# Patient Record
Sex: FEMALE | Race: WHITE
[De-identification: names, ages, dates, MRNs, and addresses within clinical notes are randomized per-mention and may not be internally consistent; named-entity substitution may affect disease eponyms.]

---

## 2018-10-29 LAB
BASOPHILS # BLD AUTO: 0.1 10^3/UL (ref 0–0.1)
BASOPHILS NFR BLD AUTO: 0 % (ref 0–10)
BUN/CREAT SERPL: 9
CALCIUM SERPL-MCNC: 8.5 MG/DL (ref 8.5–10.1)
CHLORIDE SERPL-SCNC: 97 MMOL/L (ref 98–107)
CO2 SERPL-SCNC: 25 MMOL/L (ref 21–32)
CREAT SERPL-MCNC: 1.06 MG/DL (ref 0.6–1.3)
EOSINOPHIL # BLD AUTO: 0 10^3/UL (ref 0–0.3)
EOSINOPHIL NFR BLD AUTO: 0 % (ref 0–10)
ERYTHROCYTE [DISTWIDTH] IN BLOOD BY AUTOMATED COUNT: 15.1 % (ref 10–14.5)
GFR SERPLBLD BASED ON 1.73 SQ M-ARVRAT: 51 ML/MIN
GLUCOSE SERPL-MCNC: 164 MG/DL (ref 70–105)
HCT VFR BLD CALC: 28 % (ref 35–52)
HGB BLD-MCNC: 9.3 G/DL (ref 11.5–16)
LYMPHOCYTES # BLD AUTO: 2.1 X 10^3 (ref 1–4)
LYMPHOCYTES NFR BLD AUTO: 13 % (ref 12–44)
MANUAL DIFFERENTIAL PERFORMED BLD QL: NO
MCH RBC QN AUTO: 28 PG (ref 25–34)
MCHC RBC AUTO-ENTMCNC: 33 G/DL (ref 32–36)
MCV RBC AUTO: 84 FL (ref 80–99)
MONOCYTES # BLD AUTO: 1.7 X 10^3 (ref 0–1)
MONOCYTES NFR BLD AUTO: 11 % (ref 0–12)
NEUTROPHILS # BLD AUTO: 12.5 X 10^3 (ref 1.8–7.8)
NEUTROPHILS NFR BLD AUTO: 76 % (ref 42–75)
PLATELET # BLD: 450 10^3/UL (ref 130–400)
PMV BLD AUTO: 10 FL (ref 7.4–10.4)
POTASSIUM SERPL-SCNC: 2.9 MMOL/L (ref 3.6–5)
RBC # BLD AUTO: 3.36 10^6/UL (ref 4.35–5.85)
SODIUM SERPL-SCNC: 132 MMOL/L (ref 135–145)
WBC # BLD AUTO: 16.5 10^3/UL (ref 4.3–11)

## 2018-11-04 ENCOUNTER — HOSPITAL ENCOUNTER (EMERGENCY)
Dept: HOSPITAL 75 - ER | Age: 73
Discharge: HOME | End: 2018-11-04
Payer: MEDICARE

## 2018-11-04 VITALS — SYSTOLIC BLOOD PRESSURE: 119 MMHG | DIASTOLIC BLOOD PRESSURE: 62 MMHG

## 2018-11-04 VITALS — WEIGHT: 154 LBS | BODY MASS INDEX: 25.66 KG/M2 | HEIGHT: 65 IN

## 2018-11-04 VITALS — DIASTOLIC BLOOD PRESSURE: 63 MMHG | SYSTOLIC BLOOD PRESSURE: 117 MMHG

## 2018-11-04 DIAGNOSIS — T78.3XXA: Primary | ICD-10-CM

## 2018-11-04 DIAGNOSIS — Z87.891: ICD-10-CM

## 2018-11-04 DIAGNOSIS — C34.90: ICD-10-CM

## 2018-11-04 DIAGNOSIS — I10: ICD-10-CM

## 2018-11-04 LAB
ALBUMIN SERPL-MCNC: 3.7 GM/DL (ref 3.2–4.5)
ALP SERPL-CCNC: 533 U/L (ref 40–136)
ALT SERPL-CCNC: 17 U/L (ref 0–55)
BASOPHILS # BLD AUTO: 0 10^3/UL (ref 0–0.1)
BASOPHILS NFR BLD AUTO: 0 % (ref 0–10)
BILIRUB SERPL-MCNC: 0.4 MG/DL (ref 0.1–1)
BUN/CREAT SERPL: 14
CALCIUM SERPL-MCNC: 9.4 MG/DL (ref 8.5–10.1)
CHLORIDE SERPL-SCNC: 98 MMOL/L (ref 98–107)
CO2 SERPL-SCNC: 21 MMOL/L (ref 21–32)
CREAT SERPL-MCNC: 1.06 MG/DL (ref 0.6–1.3)
EOSINOPHIL # BLD AUTO: 0 10^3/UL (ref 0–0.3)
EOSINOPHIL NFR BLD AUTO: 0 % (ref 0–10)
ERYTHROCYTE [DISTWIDTH] IN BLOOD BY AUTOMATED COUNT: 14.9 % (ref 10–14.5)
GFR SERPLBLD BASED ON 1.73 SQ M-ARVRAT: 51 ML/MIN
GLUCOSE SERPL-MCNC: 113 MG/DL (ref 70–105)
HCT VFR BLD CALC: 33 % (ref 35–52)
HGB BLD-MCNC: 10.7 G/DL (ref 11.5–16)
LYMPHOCYTES # BLD AUTO: 1.4 X 10^3 (ref 1–4)
LYMPHOCYTES NFR BLD AUTO: 17 % (ref 12–44)
MANUAL DIFFERENTIAL PERFORMED BLD QL: NO
MCH RBC QN AUTO: 27 PG (ref 25–34)
MCHC RBC AUTO-ENTMCNC: 33 G/DL (ref 32–36)
MCV RBC AUTO: 83 FL (ref 80–99)
MONOCYTES # BLD AUTO: 0 X 10^3 (ref 0–1)
MONOCYTES NFR BLD AUTO: 1 % (ref 0–12)
NEUTROPHILS # BLD AUTO: 6.6 X 10^3 (ref 1.8–7.8)
NEUTROPHILS NFR BLD AUTO: 82 % (ref 42–75)
PLATELET # BLD: 342 10^3/UL (ref 130–400)
PMV BLD AUTO: 9.5 FL (ref 7.4–10.4)
POTASSIUM SERPL-SCNC: 4.7 MMOL/L (ref 3.6–5)
PROT SERPL-MCNC: 7.4 GM/DL (ref 6.4–8.2)
RBC # BLD AUTO: 3.91 10^6/UL (ref 4.35–5.85)
SODIUM SERPL-SCNC: 130 MMOL/L (ref 135–145)
WBC # BLD AUTO: 8.1 10^3/UL (ref 4.3–11)

## 2018-11-04 PROCEDURE — 85025 COMPLETE CBC W/AUTO DIFF WBC: CPT

## 2018-11-04 PROCEDURE — 36415 COLL VENOUS BLD VENIPUNCTURE: CPT

## 2018-11-04 PROCEDURE — 96374 THER/PROPH/DIAG INJ IV PUSH: CPT

## 2018-11-04 PROCEDURE — 80053 COMPREHEN METABOLIC PANEL: CPT

## 2018-11-04 PROCEDURE — 96375 TX/PRO/DX INJ NEW DRUG ADDON: CPT

## 2018-11-04 NOTE — XMS REPORT
MU2 Ambulatory Summary

 Created on: 2018



Reina Griggs

External Reference #: [id number]

: 1945

Sex: Female



Demographics







 Address  [address]

[place]  [address]

 

 Home Phone  [phone]

 

 Preferred Language  English

 

 Marital Status  

 

 Jainism Affiliation  Unknown

 

 Race  White

 

 Ethnic Group  Not  or 





Author







 Author  Saqib Bustillos

 

 [facility]

 

 Address  [address]

[place], [place]  [id number]



 

 Phone  [phone]







Care Team Providers







 Care Team Member Name  Role  Phone

 

 Saqib Bustillos  PCP  [phone]

 

 Navin Raza  PreferredProvider  [phone]







Allergies and Adverse Reactions







 Name  Reaction  Notes

 

 PENICILLINS      







Plan of Treatment







 Planned Activity  Comments  Planned Date  Planned Time  Plan/Goal

 

 CBC With Auto Differential     2012  12:00 AM   

 

 Chest X-ray, PA and lateral     2012  12:00 AM   

 

 Diagnostic Mammogram     2012  12:00 AM   

 

 Mammography; bilateral     2018  12:00 AM   

 

 Screening mammography, bilateral     2015  12:00 AM   







Medications







 Active 

 

 Name  Start Date  Estimated Completion Date  SIG  Comments

 

 nystatin-triamcinolone 100,000-0.1 unit/g-% topical cream  2013     apply 
to the affected area(s) by topical route 2 times per day in the morning and 
evening   

 

 imipramine HCl 50 mg oral tablet  2014     TAKE 2 TABLETS BY MOUTH EVERY 
MORNING AND 3 TABLETS BY MOUTH EVERY NIGHT AT BEDTIME   

 

 Vesicare 5 mg oral tablet  2014     TAKE 1 TABLET BY MOUTH DAILY   

 

 tramadol 50 mg oral tablet  2014     TAKE 1 TABLET BY MOUTH EVERY 6 HOURS 
AS NEEDED   

 

 imipramine HCl 50 mg oral tablet  2014     TAKE 2 TABLETS BY MOUTH EVERY 
MORNING AND 3 TABLETS BY MOUTH EVERY NIGHT AT BEDTIME   

 

 imipramine HCl 50 mg oral tablet  10/20/2014     TAKE 2 TABLETS BY MOUTH EVERY 
MORNING AND 3 TABLETS BY MOUTH EVERY NIGHT AT BEDTIME   

 

 tramadol 50 mg oral tablet  10/23/2014     TAKE 1 TABLET BY MOUTH EVERY 6 
HOURS AS NEEDED   

 

 Vesicare 5 mg oral tablet  10/23/2014     TAKE 1 TABLET BY MOUTH DAILY   

 

 tramadol 50 mg oral tablet  10/23/2014     TAKE 1 TABLET BY MOUTH EVERY 6 
HOURS AS NEEDED   

 

 Vesicare 5 mg oral tablet  10/23/2014     TAKE 1 TABLET BY MOUTH DAILY   

 

 metoclopramide HCl 10 mg oral tablet  2014     TAKE 1 TABLET BY MOUTH 
FOUR TIMES DAILY 30 MINUTES BEFORE MEALS AND AT BEDTIME   

 

 Abdominal Belt 1 abdominal binder  2015     Use PRN for incisional support
   

 

 imipramine HCl 50 mg oral tablet  2015     TAKE 2 TABLETS BY MOUTH EVERY 
MORNING AND 3 TABLETS BY MOUTH EVERY NIGHT AT BEDTIME   

 

 Vesicare 5 mg oral tablet  3/10/2015     TAKE 1 TABLET BY MOUTH DAILY   

 

 tramadol 50 mg oral tablet  2015     TAKE 1 TABLET BY MOUTH EVERY 6 HOURS 
AS NEEDED   

 

 tramadol 50 mg oral tablet  2015     TAKE 1 TABLET BY MOUTH EVERY 6 HOURS 
AS NEEDED   

 

 imipramine HCl 50 mg oral tablet  2015     TAKE 2 TABLETS BY MOUTH EVERY 
MORNING AND 3 TABLETS BY MOUTH EVERY NIGHT AT BEDTIME   

 

 metoclopramide HCl 10 mg oral tablet  2015     TAKE 1 TABLET BY MOUTH 
FOUR TIMES DAILY 30 MINUTES BEFORE MEALS AND AT BEDTIME   

 

 levothyroxine 50 mcg oral tablet  2015     TAKE 1 TABLET BY MOUTH ONCE 
DAILY   

 

 imipramine HCl 50 mg oral tablet  2015     TAKE 2 TABLETS BY MOUTH EVERY 
MORNING AND 3 TABLETS BY MOUTH EVERY NIGHT AT BEDTIME   

 

 Vesicare 5 mg oral tablet  2016     TAKE 1 TABLET BY MOUTH DAILY   

 

 metoclopramide HCl 10 mg oral tablet  2016     TAKE 1 TABLET BY MOUTH 
FOUR TIMES DAILY 30 MINUTES BEFORE MEALS AND AT BEDTIME   

 

 imipramine HCl 50 mg oral tablet  2016     TAKE 2 TABLETS BY MOUTH EVERY 
MORNING AND 3 TABLETS BY MOUTH EVERY NIGHT AT BEDTIME   

 

 imipramine HCl 50 mg oral tablet  2016     TAKE 2 TABLETS BY MOUTH EVERY 
MORNING AND 3 TABLETS BY MOUTH EVERY NIGHT AT BEDTIME   

 

 ProAir HFA 90 mcg/actuation inhalation HFA aerosol inhaler  2016     
inhale 1 - 2 puffs (90 - 180 mcg) by inhalation route every 6 hours as needed   

 

 ProAir HFA 90 mcg/actuation inhalation HFA aerosol inhaler  10/31/2016     
INHALE 1 TO 2 PUFFS BY MOUTH EVERY 6 HOURS AS NEEDED   

 

 Nebulizer and accessories  10/31/2016     Use as directed DX: COPD RAFAEL: 99   

 

 albuterol sulfate 2.5 mg /3 mL (0.083 %) inhalation solution for nebulization  
10/31/2016     inhale 3 milliliters (2.5 mg) by nebulization route every 8 
hours as needed   

 

 ProAir HFA 90 mcg/actuation inhalation HFA aerosol inhaler  10/31/2016     
INHALE 1 - 2 PUFFS (90 - 180 MCG) BY INHALATION ROUTE EVERY 6 HOURS AS NEEDED   

 

 Vesicare 5 mg oral tablet  2017     TAKE 1 TABLET BY MOUTH DAILY   

 

 imipramine HCl 50 mg oral tablet  2017     TAKE 2 TABLETS BY MOUTH EVERY 
MORNING AND 3 TABLETS BY MOUTH EVERY NIGHT AT BEDTIME   

 

 imipramine HCl 50 mg oral tablet  2017     TAKE 2 TABLETS BY MOUTH EVERY 
MORNING AND 3 EVERY NIGHT AT BEDTIME   

 

 tramadol 50 mg oral tablet  10/2/2017     take 1 tablet (50 mg) by oral route 
every 6 hours as needed   

 

 metoclopramide HCl 10 mg oral tablet  2017  TAKE 1 TABLET BY 
MOUTH FOUR TIMES DAILY 30 MINUTES BEFORE MEALS AND AT BEDTIME FOR 30 DAYS   

 

 levothyroxine 50 mcg oral tablet  2017  TAKE 1 TABLET BY 
MOUTH EVERY DAY. NEED APPOINTMENT FOR FURTHER REFILLS   









  

 

 Name  Start Date  Expiration Date  SIG  Comments

 

 tramadol 50 mg oral tablet  2013  1/15/2014  take 1 tablet (50 mg) by 
oral route every 6 hours as needed for 30 days   

 

 Reglan 10 mg oral tablet  2014  take 1 tablet (10 mg) by oral 
route 4 times per day 30 minutes before meals and at bedtime   

 

 omeprazole 40 mg oral capsule,delayed release(DR/EC)  10/13/2014  10/8/2015  
take 1 capsule (40 mg) by oral route once daily before a meal for 30 days   

 

 Synthroid 50 mcg oral tablet  10/23/2014  10/18/2015  take 1 tablet (50 mcg) 
by oral route once daily for 90 days   

 

 Zithromax Z-Paul 250 mg oral tablet  2014  Take 2 tablets the 
first day (500 mg) followed by 1 tablet (250 mg) days 2-5. for 5 days   

 

 Medrol (Paul) 4 mg oral tablets,dose pack  2014     take as directed   

 

 azithromycin 250 mg oral tablet  3/24/2015  3/29/2015  take 2 tablets (500 mg) 
by oral route once daily for 1 day then 1 tablet (250 mg) by oral route once 
daily for 4 days   

 

 Bactrim -160 mg oral tablet  2016  10/6/2016  take 1 tablet by oral 
route 2 times per day for 7 days   









 Discontinued 

 

 Name  Start Date  Discontinued Date  SIG  Comments

 

 nystatin-triamcinolone 100,000-0.1 unit/g-% topical cream  2014
  APPLY TO THE AFFECTED AREA TWICE DAILY , IN MORNING AND EVENING   

 

 Synthroid 50 mcg oral tablet  10/23/2014  2016  TAKE 1 TABLET (50 MCG) BY 
ORAL ROUTE ONCE DAILY FOR 90 DAYS   

 

 Reglan 10 mg oral tablet  2014  TAKE 1 TABLET (10 MG) BY 
ORAL ROUTE 4 TIMES PER DAY 30 MINUTES BEFORE MEALS AND AT BEDTIME   

 

 oxycodone 15 mg oral tablet  2014  take 1 tablet (15 mg) by 
oral route every 6 hours   

 

 Medrol (Paul) 4 mg oral tablets,dose pack  3/24/2015  2016  take as 
directed   







Problem List







 Description  Status  Onset

 

 Bipolar Disorder  Active   

 

 Chronic Obstructive Pulmonary Disease  Active   

 

 Gastroesophageal Reflux  Active   

 

 Hypothyroidism, Acquired  Active  10/31/2013

 

 Abdominal Pain, RUQ  Active   







Vital Signs







 Date  Time  BP-Sys(mm[Hg]  BP-Sabrina(mm[Hg])  HR(bpm)  RR(rpm)  Temp  WT  HT  HC  
BMI  BSA  BMI Percentile  O2 Sat(%)

 

 10/26/2016  5:04:00 PM  132 mmHg  74 mmHg  84 bpm  18 rpm  97.8 F  176.125 lbs
                 95 %

 

 2016  7:10:00 PM  124 mmHg  68 mmHg  94 bpm  20 rpm  99 F  169 lbs  66.5 
in     26.8684 kg/m  1.8965 m     95 %

 

 2016  10:18:00 AM  148 mmHg  80 mmHg  82 bpm  18 rpm  97.8 F  169 lbs  
66.5 in     26.87 kg/m2  1.90 m2     97 %

 

 3/24/2015  6:08:00 PM  120 mmHg  78 mmHg  89 bpm  20 rpm  98.3 F  160 lbs     
            96 %

 

 2015  1:55:00 PM  154 mmHg  80 mmHg  88 bpm  20 rpm  97.3 F     66.5 in  
             

 

 2015  1:36:00 PM  166 mmHg  82 mmHg  94 bpm  24 rpm  96.2 F  162 lbs  66.5 
in     25.7555 kg/m  1.8568 m     98 %

 

 2015  3:17:00 PM  140 mmHg  76 mmHg  95 bpm  20 rpm  96 F                 
   99 %

 

 2015  1:48:00 PM  160 mmHg  78 mmHg  87 bpm  24 rpm  96.5 F               
     94 %

 

 2014  1:41:00 PM  156 mmHg  84 mmHg  94 bpm  20 rpm  96.3 F  166 lbs  
66.5 in     26.39 kg/m2  1.88 m2     97 %

 

 2014  9:43:00 AM  142 mmHg  64 mmHg  100 bpm  18 rpm  97.2 F  166 lbs  
66.5 in     26.3914 kg/m  1.8796 m      

 

 2014  5:05:00 PM  150 mmHg  62 mmHg  99 bpm  20 rpm  97.6 F  170 lbs  
66.5 in     27.03 kg/m2  1.90 m2     97 %

 

 2014  5:54:00 PM  145 mmHg  90 mmHg  93 bpm  18 rpm  97.5 F  170 lbs  
66.5 in     27.0274 kg/m  1.9021 m     96 %

 

 10/6/2014  3:21:00 PM  140 mmHg  82 mmHg  82 bpm  18 rpm  97.6 F  170 lbs  
66.5 in     27.03 kg/m2  1.90 m2      

 

 10/31/2013  10:02:00 AM  118 mmHg  64 mmHg  78 bpm  18 rpm  98 F  161 lbs  
66.5 in     25.5965 kg/m  1.8511 m      

 

 2013  1:28:00 PM  150 mmHg  80 mmHg  84 bpm  16 rpm  98.1 F  161 lbs  
66.5 in     25.60 kg/m2  1.85 m2     97 %

 

 2012  8:27:00 AM  136 mmHg  68 mmHg  72 bpm  18 rpm  97.6 F  157 lbs  
66.5 in     24.9606 kg/m  1.8279 m      







Social History







 Name  Description  Comments

 

 Tobacco  Current every day smoker  10/26/2016 -  







History of Procedures







 Date Ordered  Description  Order Status

 

 2016 12:00 AM  COMPLETE CBC W/AUTO DIFF WBC  Reviewed

 

 2016 12:00 AM  COMPREHEN METABOLIC PANEL  Reviewed

 

 2016 12:00 AM  LIPID PANEL  Reviewed

 

 2016 12:00 AM  GLYCOSYLATED HEMOGLOBIN TEST  Reviewed

 

 2016 12:00 AM  ASSAY THYROID STIM HORMONE  Reviewed

 

 2016 12:00 AM  AIRWAY INHALATION TREATMENT  Reviewed

 

 10/26/2016 12:00 AM  COMPLETE CBC W/AUTO DIFF WBC  Reviewed

 

 10/26/2016 12:00 AM  COMPREHEN METABOLIC PANEL  Reviewed

 

 10/26/2016 12:00 AM  CHEST X-RAY 2VW FRONTAL&LATL  Reviewed

 

 2012 12:00 AM  COMPUTER DX MAMMOGRAM ADD-ON  Reviewed

 

 2012 12:00 AM  COMPREHEN METABOLIC PANEL  Reviewed

 

 2012 12:00 AM  LIPID PANEL  Reviewed

 

 2012 12:00 AM  CHEST X-RAY 2VW FRONTAL&LATL  Reviewed

 

 2012 12:00 AM  CHEST X-RAY 2VW FRONTAL&LATL  Reviewed

 

 2013 12:00 AM  MAMMOGRAM SCREENING  Reviewed

 

 2013 12:00 AM  COMPLETE CBC W/AUTO DIFF WBC  Reviewed

 

 2013 12:00 AM  COMPREHEN METABOLIC PANEL  Reviewed

 

 2013 12:00 AM  ASSAY THYROID STIM HORMONE  Reviewed

 

 10/6/2014 12:00 AM  COMPLETE CBC W/AUTO DIFF WBC  Reviewed

 

 10/6/2014 12:00 AM  COMPREHEN METABOLIC PANEL  Reviewed

 

 10/6/2014 12:00 AM  GLYCOSYLATED HEMOGLOBIN TEST  Reviewed

 

 10/6/2014 12:00 AM  ASSAY THYROID STIM HORMONE  Reviewed

 

 10/6/2014 12:00 AM  INFLUENZA VIRUS VACCINE SPLIT VIRUS 3/> YRS IM  Reviewed

 

 2014 12:00 AM  CT ABDOMEN W/O & W/DYE  Reviewed

 

 2014 12:00 AM  CT PELVIS W/O & W/DYE  Reviewed

 

 2014 12:00 AM  CT ABD & PELV 1/> REGNS  Reviewed

 

 2014 12:00 AM  ECHO EXAM OF ABDOMEN  Reviewed

 

 2014 12:00 AM  COMPLETE CBC AUTOMATED  Reviewed

 

 2014 12:00 AM  COMPREHEN METABOLIC PANEL  Reviewed

 

 2014 12:00 AM  ASSAY OF AMYLASE  Reviewed

 

 2014 12:00 AM  ASSAY OF LIPASE  Reviewed

 

 2014 12:00 AM  COMPLETE CBC W/AUTO DIFF WBC  Reviewed







Results Summary







 Date and Description  Results

 

 2012 11:18 AM  GLUCOSE 97.0 mg/dLSODIUM 136.0 mmol/LPOTASSIUM 4.20 mmol/
LCHLORIDE 102.0 mmol/LCO2 22.0 mmol/LBUN 7.0 mg/dLCREATININE 1.0 mg/dLSGOT/AST 
12.0 IU/LSGPT/ALT 6.0 IU/LALK PHOS 128.0 IU/LTOTAL PROTEIN 7.40 g/dLALBUMIN 
4.20 g/dLTOTAL BILI 0.40 mg/dLCALCIUM 9.30 mg/dLAGE 66 GFR NonAA 55 GFR AA 67 
eGFR 55 eGFR AA* 60 TRIGLYCERIDES 299.0 mg/dLCHOLESTEROL 293.0 mg/dLHDL 33.0 mg/
dLTOT CHOL/HDL 8.9 LDL (CALC) 200.0 mg/dL

 

 10/24/2013 12:32 PM  WBC 11.1 RBC 5.19 HGB 14.80 g/dLHCT 44.0 %MCV 85.0 fLMCH 
28.50 pgMCHC 33.60 g/dLRDW SD 46 RDW CV 14.90 %MPV 10.50 fLPLT 357 NRBC# 0.00 
NRBC% 0.0 %NEUT 63.70 %%LYMP 26.0 %%MONO 8.50 %%EOS 1.50 %%BASO 0.30 %#NEUT 
7.07 #LYMP 2.88 #MONO 0.94 #EOS 0.17 #BASO 0.03 MANUAL DIFF NOT IND TSH 1.140 
uIU/mLGLUCOSE 109.0 mg/dLSODIUM 136.0 mmol/LPOTASSIUM 4.70 mmol/LCHLORIDE 104.0 
mmol/LCO2 22.0 mmol/LBUN 20.0 mg/dLCREATININE 0.90 mg/dLSGOT/AST 14.0 IU/LSGPT/
ALT 13.0 IU/LALK PHOS 90.0 IU/LTOTAL PROTEIN 7.20 g/dLALBUMIN 4.20 g/dLTOTAL 
BILI 0.40 mg/dLCALCIUM 9.70 mg/dLAGE 68 GFR NonAA 62 GFR AA 75 eGFR >60 mL/min/
1.73 m2eGFR AA* >60 

 

 10/6/2014 4:10 PM  HGB A1C 7.20 %Est Avg Glucose 159.9 mg/dLTSH 1.860 uIU/
mLWBC 11.5 RBC 5.21 HGB 14.60 g/dLHCT 43.0 %MCV 83.0 fLMCH 28.0 pgMCHC 34.0 g/
dLRDW SD 46 RDW CV 15.20 %MPV 10.20 fLPLT 311 NRBC# 0.00 NRBC% 0.0 %NEUT 65.30 %
%LYMP 25.20 %%MONO 8.0 %%EOS 1.20 %%BASO 0.30 %#NEUT 7.51 #LYMP 2.89 #MONO 0.92 
#EOS 0.14 #BASO 0.03 MANUAL DIFF SEE BELOW SEGS 69 LYMPHS 20 MONOS 9 EOS 1.0 %
BASO 1.0 %WBC 11.5 RBC 5.21 HGB 14.60 g/dLHCT 43.0 %MCV 83.0 fLMCH 28.0 pgMCHC 
34.0 g/dLRDW SD 46 RDW CV 15.20 %MPV 10.20 fLPLT 311 NRBC# 0.00 NRBC% 0.0 %NEUT 
65.30 %%LYMP 25.20 %%MONO 8.0 %%EOS 1.20 %%BASO 0.30 %#NEUT 7.51 #LYMP 2.89 #
MONO 0.92 #EOS 0.14 #BASO 0.03 MANUAL DIFF PENDING GLUCOSE 97.0 mg/dLSODIUM 
136.0 mmol/LPOTASSIUM 4.30 mmol/LCHLORIDE 103.0 mmol/LCO2 22.0 mmol/LBUN 28.0 mg
/dLCREATININE 0.80 mg/dLSGOT/AST 15.0 IU/LSGPT/ALT 12.0 IU/LALK PHOS 121.0 IU/
LTOTAL PROTEIN 7.60 g/dLALBUMIN 4.10 g/dLTOTAL BILI 0.20 mg/dLCALCIUM 9.10 mg/
dLAGE 69 GFR NonAA 71 GFR AA 86 eGFR 60 eGFR AA* 60 

 

 2014 2:40 PM  WBC 9.1 RBC 5.23 HGB 14.90 g/dLHCT 44.0 %MCV 84.0 fLMCH 
28.50 pgMCHC 33.90 g/dLRDW SD 48 RDW CV 16.0 %MPV 10.30 fLPLT 348 NRBC# 0.00 
NRBC% 0.0 GLUCOSE 68.0 mg/dLSODIUM 136.0 mmol/LPOTASSIUM 4.30 mmol/LCHLORIDE 
102.0 mmol/LCO2 24.0 mmol/LBUN 12.0 mg/dLCREATININE 0.90 mg/dLSGOT/AST 48.0 IU/
LSGPT/.0 IU/LALK PHOS 293.0 IU/LTOTAL PROTEIN 7.30 g/dLALBUMIN 4.20 g/
dLTOTAL BILI 0.40 mg/dLCALCIUM 9.70 mg/dLAGE 69 GFR NonAA 62 GFR AA 75 eGFR 60 
eGFR AA* 60 LIPASE 14.0 U/LAMYLASE 36 IU/L

 

 2014 5:45 AM  GLUCOSE 107.0 mg/dLSODIUM 136.0 mmol/LPOTASSIUM 3.90 mmol/
LCHLORIDE 102.0 mmol/LCO2 23.0 mmol/LBUN 10.0 mg/dLCREATININE 0.70 mg/dLSGOT/
AST 45.0 IU/LSGPT/.0 IU/LALK PHOS 287.0 IU/LTOTAL PROTEIN 6.80 g/
dLALBUMIN 3.60 g/dLTOTAL BILI 0.50 mg/dLCALCIUM 9.50 mg/dLAGE 69 GFR NonAA 83 
GFR  eGFR 60 eGFR AA* 60 WBC 21.0 RBC 5.05 HGB 13.80 g/dLHCT 42.70 %MCV 
85.0 fLMCH 27.30 pgMCHC 32.30 g/dLRDW SD 50 RDW CV 16.50 %MPV 10.40 fLPLT 301 
NRBC# 0.00 NRBC% 0.0 %NEUT 78.40 %%LYMP 13.90 %%MONO 7.70 %%EOS 0.0 %%BASO 0.0 %
#NEUT 16.48 #LYMP 2.93 #MONO 1.62 #EOS 0.00 #BASO 0.01 MANUAL DIFF SEE BELOW 
SEGS 75 BANDS 1 LYMPHS 17 MONOS 7 ATYP LYMPHS 1+ 

 

 2016 11:05 AM  WBC 10.0 RBC 5.49 HGB 15.50 g/dLHCT 47.60 %MCV 87.0 fLMCH 
28.20 pgMCHC 32.60 g/dLRDW SD 48 RDW CV 15.30 %MPV 9.80 fLPLT 290 NRBC# 0.00 
NRBC% 0.0 %NEUT 58.30 %%LYMP 32.50 %%MONO 7.40 %%EOS 0.80 %%BASO 0.50 %#NEUT 
5.83 #LYMP 3.25 #MONO 0.74 #EOS 0.08 #BASO 0.05 MANUAL DIFF NOT IND HGB A1C 
5.60 %Est Avg Glucose 114.0 mg/dLTSH 2.360 uIU/mLGLUCOSE 96.0 mg/dLSODIUM 137.0 
mmol/LPOTASSIUM 4.70 mmol/LCHLORIDE 102.0 mmol/LCO2 25.0 mmol/LBUN 17.0 mg/
dLCREATININE 1.0 mg/dLSGOT/AST 16.0 IU/LSGPT/ALT 13.0 IU/LALK PHOS 109.0 IU/
LTOTAL PROTEIN 7.30 g/dLALBUMIN 4.60 g/dLTOTAL BILI 0.40 mg/dLCALCIUM 9.70 mg/
dLAGE 71 GFR NonAA 55 GFR AA 67 eGFR 55 eGFR AA* >60 CHOLESTEROL 292.0 mg/dL

 

 10/26/2016 5:39 PM  WBC 11.6 RBC 5.52 HGB 15.50 g/dLHCT 48.40 %MCV 88.0 fLMCH 
28.10 pgMCHC 32.0 g/dLRDW SD 47 RDW CV 14.60 %MPV 9.70 fLPLT 298 NRBC# 0.00 NRBC
% 0.0 %NEUT 53.80 %%LYMP 32.20 %%MONO 8.80 %%EOS 4.10 %%BASO 0.60 %#NEUT 6.22 #
LYMP 3.72 #MONO 1.02 #EOS 0.48 #BASO 0.07 MANUAL DIFF NOT IND GLUCOSE 92.0 mg/
dLSODIUM 138.0 mmol/LPOTASSIUM 4.50 mmol/LCHLORIDE 99.0 mmol/LCO2 26.0 mmol/
LBUN 13.0 mg/dLCREATININE 1.0 mg/dLSGOT/AST 15.0 IU/LSGPT/ALT 15.0 IU/LALK PHOS 
125.0 IU/LTOTAL PROTEIN 7.50 g/dLALBUMIN 4.30 g/dLTOTAL BILI 0.30 mg/dLCALCIUM 
9.60 mg/dLAGE 71 GFR NonAA 55 GFR AA 67 eGFR 55 eGFR AA* >60 







History Of Immunizations







 Name  Date Admin  Mfg Name  Mfg Code  Trade Name  Lot#  Route  Inj  Vis Given  
Vis Pub  CVX

 

 Influenza  10/18/2014  sanofi pasteur  PMC  Fluzone Quadrivalent  [id number]  
Intramuscular  Left Deltoid  10/18/2014  2014  141







History of Past Illness







 Name  Date of Onset  Comments

 

 Bipolar Disorder      

 

 Gastroesophageal Reflux      

 

 Chronic Obstructive Pulmonary Disease      

 

 Hypothyroidism, Acquired  10/31/2013   

 

 Abdominal Pain, RUQ      

 

 Screening Mammogram  May  9 2012  9:23AM   

 

 Hyperlipidemia, unspecified  2012  8:37AM   

 

 Cough  2012  8:37AM   

 

 Pneumonia  2012  5:57PM   

 

 Upper Respiratory Infection  2012  4:57PM   

 

 Screening Mammogram For High Risk Patient  Aug 30 2012 10:13AM   

 

 Screening Mammogram  Sep 17 2013  8:10AM   

 

 Hypothyroidism, Acquired  Sep 17 2013  1:30PM   

 

 Chronic kidney disease  Sep 17 2013  1:30PM   

 

 Yeast Of Skin  Sep 17 2013  1:30PM   

 

 Hypothyroidism, Acquired  Oct 31 2013 10:07AM   

 

 Hypothyroidism, Acquired  Oct  6 2014  3:25PM   

 

 Hyperglycemia  Oct  6 2014  3:25PM   

 

 Esophageal Reflux  Oct  6 2014  3:25PM   

 

 Diabetes Mellitus, Type II  Oct 13 2014 10:43AM   

 

 Esophageal Reflux  Oct 13 2014 10:43AM   

 

 Bronchitis, Acute  2014  5:55PM   

 

 Upper Respiratory Infections  2014  5:55PM   

 

 Hernia, incisional  Dec 16 2014  5:12PM   

 

 Abdominal pain  Dec 16 2014  5:12PM   

 

 Abdominal Pain  Dec 17 2014  9:49AM   

 

 Abdominal pain, RUQ  Dec 22 2014  2:02PM   

 

 Chronic Cholecystitis  Dec 22 2014  2:02PM   

 

 Incisional Hernia: No Obstruction Or Gangrene  Dec 22 2014  2:02PM   

 

 Postoperative Follow-up: Cholecystectomy  2015  1:49PM   

 

 Postoperative Follow-Up Visit  2015  1:38PM   

 

 Postoperative Follow-Up Visit  2015  1:56PM   

 

 Bronchitis  Mar 24 2015  6:10PM   

 

 Postoperative Follow-Up Visit  2015  3:23PM   

 

 Screening Mammogram  May 11 2015 12:13PM   

 

 Hypothyroidism, Acquired  Sep 14 2016 10:25AM   

 

 Low Back Pain  Sep 14 2016 10:25AM   

 

 Hyperglycemia  Sep 14 2016 10:25AM   

 

 Bronchitis  Sep 29 2016  7:12PM   

 

 Wheezing  Sep 29 2016  7:12PM   

 

 Coughing  Sep 29 2016  7:12PM   

 

 Cough  Oct 26 2016  5:06PM   

 

 Shortness of breath  Oct 26 2016  5:06PM   

 

 Chronic obstructive pulmonary disease, unspecified COPD type  Oct 26 2016  5:
06PM   

 

 Encounter for screening mammogram for breast cancer  2018 10:38AM   







Payers







 Insurance Name  Company Name  Plan Name  Plan Number  Policy Number  Policy 
Group Number  Start Date

 

    Medicare [facility]  Medicare [facility]     [id number]     N/A

 

    Medicare Part B  Medicare [facility]     [id number]     N/A

 

    BCBS  [facility]     [id number]     N/A

 

    [place] [employer]  [facility]     
[id number]     N/A

 

    Medicare Part A  Medicare Part A     [id number]     N/A

 

    Medicare Part A  Medicare - Lab/Xray     [id number]     N/A







History of Encounters







 Visit Date  Visit Type  Provider

 

 10/26/2016  Office visit  Saqib Bustillos APRN

 

 2016  Office visit  Saqib Bustillos APRN

 

 2016  Office visit   

 

 2016  Office visit  Navin Raza MD

 

 3/24/2015  Office visit  Carley BUSH

 

 2015  Office visit  Jaiver Preston MD

 

 2015  Office visit  Javier Preston MD

 

 2015  Office visit  Javier Preston MD

 

 2015  Office visit  Javier Preston MD

 

 2014  Hospital  Javier Preston MD

 

 2014  Hospital  Javier Preston MD

 

 2014  Office visit   

 

 2014  Office visit  Javier Preston MD

 

 2014  Office visit  Navin Raza MD

 

 2014  Office visit  Rosamaria Maria APRN

 

 2014  Office visit  Sugey Hawkins APRN

 

 10/13/2014  Office visit  Navin Raza MD

 

 10/6/2014  Office visit   

 

 10/6/2014  Office visit  Navin Raza MD

 

 10/31/2013  Office visit  Navin Raza MD

 

 2013  Office visit  Navin Raza MD

 

 2012  Office visit  Navin Raza MD

## 2018-11-04 NOTE — XMS REPORT
MU2 Ambulatory Summary

 Created on: 2016



Reina Griggs

External Reference #: [id number]

: 1945

Sex: Female



Demographics







 [place]  [address]

[place]  [address]

 

 Home Phone  [phone]

 

 Preferred Language  English

 

 Marital Status  

 

 Anabaptism Affiliation  Unknown

 

 Race  White

 

 Ethnic Group  Not  or 





Author







 Author  Navin Raza

 

 [facility]

 

 Address  [address]

[place], [place]  [id number]



 

 Phone  [phone]







Care Team Providers







 Care Team Member Name  Role  Phone

 

 Navin Raza  PCP  Unavailable







Allergies and Adverse Reactions







 Name  Reaction  Notes

 

 PENICILLINS      







Plan of Treatment







 Planned Activity  Comments  Planned Date  Planned Time  Plan/Goal

 

 COMPLETE CBC W/AUTO DIFF WBC     2016  12:00 AM   

 

 COMPREHEN METABOLIC PANEL     2016  12:00 AM   

 

 LIPID PANEL     2016  12:00 AM   

 

 GLYCOSYLATED HEMOGLOBIN TEST     2016  12:00 AM   

 

 ASSAY THYROID STIM HORMONE     2016  12:00 AM   

 

 COMPLETE CBC W/AUTO DIFF WBC     2012  12:00 AM   

 

 CHEST X-RAY 2VW FRONTAL&LATL     2012  12:00 AM   

 

 COMPUTER DX MAMMOGRAM ADD-ON     2012  12:00 AM   

 

 MAMMOGRAM SCREENING     2015  12:00 AM   







Medications







 Active 

 

 Name  Start Date  Estimated Completion Date  SIG  Comments

 

 nystatin-triamcinolone 100,000-0.1 unit/g-% topical cream  2013     apply 
to the affected area(s) by topical route 2 times per day in the morning and 
evening   

 

 imipramine HCl 50 mg oral tablet  2014     TAKE 2 TABLETS BY MOUTH EVERY 
MORNING AND 3 TABLETS BY MOUTH EVERY NIGHT AT BEDTIME   

 

 Vesicare 5 mg oral tablet  2014     TAKE 1 TABLET BY MOUTH DAILY   

 

 tramadol 50 mg oral tablet  2014     TAKE 1 TABLET BY MOUTH EVERY 6 HOURS 
AS NEEDED   

 

 imipramine HCl 50 mg oral tablet  2014     TAKE 2 TABLETS BY MOUTH EVERY 
MORNING AND 3 TABLETS BY MOUTH EVERY NIGHT AT BEDTIME   

 

 imipramine HCl 50 mg oral tablet  10/20/2014     TAKE 2 TABLETS BY MOUTH EVERY 
MORNING AND 3 TABLETS BY MOUTH EVERY NIGHT AT BEDTIME   

 

 tramadol 50 mg oral tablet  10/23/2014     TAKE 1 TABLET BY MOUTH EVERY 6 
HOURS AS NEEDED   

 

 Vesicare 5 mg oral tablet  10/23/2014     TAKE 1 TABLET BY MOUTH DAILY   

 

 tramadol 50 mg oral tablet  10/23/2014     TAKE 1 TABLET BY MOUTH EVERY 6 
HOURS AS NEEDED   

 

 Vesicare 5 mg oral tablet  10/23/2014     TAKE 1 TABLET BY MOUTH DAILY   

 

 metoclopramide HCl 10 mg oral tablet  2014     TAKE 1 TABLET BY MOUTH 
FOUR TIMES DAILY 30 MINUTES BEFORE MEALS AND AT BEDTIME   

 

 Abdominal Belt 1 abdominal binder  2015     Use PRN for incisional support
   

 

 imipramine HCl 50 mg oral tablet  2015     TAKE 2 TABLETS BY MOUTH EVERY 
MORNING AND 3 TABLETS BY MOUTH EVERY NIGHT AT BEDTIME   

 

 Vesicare 5 mg oral tablet  3/10/2015     TAKE 1 TABLET BY MOUTH DAILY   

 

 tramadol 50 mg oral tablet  2015     TAKE 1 TABLET BY MOUTH EVERY 6 HOURS 
AS NEEDED   

 

 tramadol 50 mg oral tablet  2015     TAKE 1 TABLET BY MOUTH EVERY 6 HOURS 
AS NEEDED   

 

 imipramine HCl 50 mg oral tablet  2015     TAKE 2 TABLETS BY MOUTH EVERY 
MORNING AND 3 TABLETS BY MOUTH EVERY NIGHT AT BEDTIME   

 

 metoclopramide HCl 10 mg oral tablet  2015     TAKE 1 TABLET BY MOUTH 
FOUR TIMES DAILY 30 MINUTES BEFORE MEALS AND AT BEDTIME   

 

 levothyroxine 50 mcg oral tablet  2015     TAKE 1 TABLET BY MOUTH ONCE 
DAILY   

 

 imipramine HCl 50 mg oral tablet  2015     TAKE 2 TABLETS BY MOUTH EVERY 
MORNING AND 3 TABLETS BY MOUTH EVERY NIGHT AT BEDTIME   

 

 Vesicare 5 mg oral tablet  2016     TAKE 1 TABLET BY MOUTH DAILY   

 

 metoclopramide HCl 10 mg oral tablet  2016     TAKE 1 TABLET BY MOUTH 
FOUR TIMES DAILY 30 MINUTES BEFORE MEALS AND AT BEDTIME   

 

 imipramine HCl 50 mg oral tablet  2016     TAKE 2 TABLETS BY MOUTH EVERY 
MORNING AND 3 TABLETS BY MOUTH EVERY NIGHT AT BEDTIME   

 

 imipramine HCl 50 mg oral tablet  2016     TAKE 2 TABLETS BY MOUTH EVERY 
MORNING AND 3 TABLETS BY MOUTH EVERY NIGHT AT BEDTIME   

 

 tramadol 50 mg oral tablet  2016     take 1 tablet (50 mg) by oral route 
every 6 hours as needed   









  

 

 Name  Start Date  Expiration Date  SIG  Comments

 

 tramadol 50 mg oral tablet  2013  1/15/2014  take 1 tablet (50 mg) by 
oral route every 6 hours as needed for 30 days   

 

 Reglan 10 mg oral tablet  2014  take 1 tablet (10 mg) by oral 
route 4 times per day 30 minutes before meals and at bedtime   

 

 omeprazole 40 mg oral capsule,delayed release(DR/EC)  10/13/2014  10/8/2015  
take 1 capsule (40 mg) by oral route once daily before a meal for 30 days   

 

 Synthroid 50 mcg oral tablet  10/23/2014  10/18/2015  take 1 tablet (50 mcg) 
by oral route once daily for 90 days   

 

 Zithromax Z-Paul 250 mg oral tablet  2014  Take 2 tablets the 
first day (500 mg) followed by 1 tablet (250 mg) days 2-5. for 5 days   

 

 Medrol (Paul) 4 mg oral tablets,dose pack  2014     take as directed   

 

 azithromycin 250 mg oral tablet  3/24/2015  3/29/2015  take 2 tablets (500 mg) 
by oral route once daily for 1 day then 1 tablet (250 mg) by oral route once 
daily for 4 days   









 Discontinued 

 

 Name  Start Date  Discontinued Date  SIG  Comments

 

 nystatin-triamcinolone 100,000-0.1 unit/g-% topical cream  2014
  APPLY TO THE AFFECTED AREA TWICE DAILY , IN MORNING AND EVENING   

 

 Synthroid 50 mcg oral tablet  10/23/2014  2016  TAKE 1 TABLET (50 MCG) BY 
ORAL ROUTE ONCE DAILY FOR 90 DAYS   

 

 Reglan 10 mg oral tablet  2014  TAKE 1 TABLET (10 MG) BY 
ORAL ROUTE 4 TIMES PER DAY 30 MINUTES BEFORE MEALS AND AT BEDTIME   

 

 oxycodone 15 mg oral tablet  2014  take 1 tablet (15 mg) by 
oral route every 6 hours   

 

 Medrol (Paul) 4 mg oral tablets,dose pack  3/24/2015  2016  take as 
directed   







Problem List







 Description  Status  Onset

 

 Bipolar Disorder  Active   

 

 Chronic Obstructive Pulmonary Disease  Active   

 

 Gastroesophageal Reflux  Active   

 

 Hypothyroidism, Acquired  Active  10/31/2013

 

 Abdominal Pain, RUQ  Active   







Vital Signs







 Date  Time  BP-Sys(mm[Hg]  BP-Sabrina(mm[Hg])  HR(bpm)  RR(rpm)  Temp  WT  HT  HC  
BMI  BSA  BMI Percentile  O2 Sat(%)

 

 2016  10:18:00 AM  148 mmHg  80 mmHg  82 bpm  18 rpm  97.8 F  169 lbs  
66.5 in     26.87 kg/m2  1.90 m2     97 %

 

 3/24/2015  6:08:00 PM  120 mmHg  78 mmHg  89 bpm  20 rpm  98.3 F  160 lbs     
            96 %

 

 2015  1:55:00 PM  154 mmHg  80 mmHg  88 bpm  20 rpm  97.3 F     66.5 in  
             

 

 2015  1:36:00 PM  166 mmHg  82 mmHg  94 bpm  24 rpm  96.2 F  162 lbs  66.5 
in     25.7555 kg/m  1.8568 m     98 %

 

 2015  3:17:00 PM  140 mmHg  76 mmHg  95 bpm  20 rpm  96 F                 
   99 %

 

 2015  1:48:00 PM  160 mmHg  78 mmHg  87 bpm  24 rpm  96.5 F               
     94 %

 

 2014  1:41:00 PM  156 mmHg  84 mmHg  94 bpm  20 rpm  96.3 F  166 lbs  
66.5 in     26.39 kg/m2  1.88 m2     97 %

 

 2014  9:43:00 AM  142 mmHg  64 mmHg  100 bpm  18 rpm  97.2 F  166 lbs  
66.5 in     26.3914 kg/m  1.8796 m      

 

 2014  5:05:00 PM  150 mmHg  62 mmHg  99 bpm  20 rpm  97.6 F  170 lbs  
66.5 in     27.03 kg/m2  1.90 m2     97 %

 

 2014  5:54:00 PM  145 mmHg  90 mmHg  93 bpm  18 rpm  97.5 F  170 lbs  
66.5 in     27.0274 kg/m  1.9021 m     96 %

 

 10/6/2014  3:21:00 PM  140 mmHg  82 mmHg  82 bpm  18 rpm  97.6 F  170 lbs  
66.5 in     27.03 kg/m2  1.90 m2      

 

 10/31/2013  10:02:00 AM  118 mmHg  64 mmHg  78 bpm  18 rpm  98 F  161 lbs  
66.5 in     25.5965 kg/m  1.8511 m      

 

 2013  1:28:00 PM  150 mmHg  80 mmHg  84 bpm  16 rpm  98.1 F  161 lbs  
66.5 in     25.60 kg/m2  1.85 m2     97 %

 

 2012  8:27:00 AM  136 mmHg  68 mmHg  72 bpm  18 rpm  97.6 F  157 lbs  
66.5 in     24.9606 kg/m  1.8279 m      







Social History







 Name  Description  Comments

 

 Tobacco  Current every day smoker   







History of Procedures







 Date Ordered  Description  Order Status

 

 2012 12:00 AM  COMPUTER DX MAMMOGRAM ADD-ON  Returned

 

 2012 12:00 AM  COMPREHEN METABOLIC PANEL  Reviewed

 

 2012 12:00 AM  LIPID PANEL  Reviewed

 

 2012 12:00 AM  CHEST X-RAY 2VW FRONTAL&LATL  Reviewed

 

 2012 12:00 AM  CHEST X-RAY 2VW FRONTAL&LATL  Returned

 

 2013 12:00 AM  MAMMOGRAM SCREENING  Reviewed

 

 2013 12:00 AM  COMPLETE CBC W/AUTO DIFF WBC  Reviewed

 

 2013 12:00 AM  COMPREHEN METABOLIC PANEL  Reviewed

 

 2013 12:00 AM  ASSAY THYROID STIM HORMONE  Reviewed

 

 10/6/2014 12:00 AM  COMPLETE CBC W/AUTO DIFF WBC  Reviewed

 

 10/6/2014 12:00 AM  COMPREHEN METABOLIC PANEL  Reviewed

 

 10/6/2014 12:00 AM  GLYCOSYLATED HEMOGLOBIN TEST  Reviewed

 

 10/6/2014 12:00 AM  ASSAY THYROID STIM HORMONE  Reviewed

 

 10/6/2014 12:00 AM  INFLUENZA VIRUS VACCINE SPLIT VIRUS 3/> YRS IM  Reviewed

 

 2014 12:00 AM  CT ABDOMEN W/O & W/DYE  Reviewed

 

 2014 12:00 AM  CT PELVIS W/O & W/DYE  Reviewed

 

 2014 12:00 AM  CT ABD & PELV 1/> REGNS  Reviewed

 

 2014 12:00 AM  ECHO EXAM OF ABDOMEN  Returned

 

 2014 12:00 AM  COMPLETE CBC AUTOMATED  Returned

 

 2014 12:00 AM  COMPREHEN METABOLIC PANEL  Returned

 

 2014 12:00 AM  ASSAY OF AMYLASE  Returned

 

 2014 12:00 AM  ASSAY OF LIPASE  Returned

 

 2014 12:00 AM  COMPLETE CBC W/AUTO DIFF WBC  Returned







Results Summary







 Data and Description  Results

 

 2012 11:18 AM  WBC 10.5 RBC 4.95 HGB 14.30 g/dLHCT 42.70 %MCV 86.0 fLMCH 
28.90 pgMCHC 33.50 g/dLRDW CV 15.90 %MPV 10.20 fLPLT 345 %NEUT 69.20 %%LYMP 
23.20 %%MONO 6.30 %%EOS 1.0 %%BASO 0.30 %#NEUT 7.25 #LYMP 2.43 #MONO 0.66 #EOS 
0.11 #BASO 0.03 GLUCOSE 97.0 mg/dLSODIUM 136.0 mmol/LPOTASSIUM 4.20 mmol/
LCHLORIDE 102.0 mmol/LCO2 22.0 mmol/LBUN 7.0 mg/dLCREATININE 1.0 mg/dLSGOT/AST 
12.0 IU/LSGPT/ALT 6.0 IU/LALK PHOS 128.0 IU/LTOTAL PROTEIN 7.40 g/dLALBUMIN 
4.20 g/dLTOTAL BILI 0.40 mg/dLCALCIUM 9.30 mg/dLeGFR 55 TRIGLYCERIDES 299.0 mg/
dLCHOLESTEROL 293.0 mg/dLHDL 33.0 mg/dLLDL (CALC) 200.0 mg/dL

 

 10/24/2013 12:32 PM  WBC 11.1 RBC 5.19 HGB 14.80 g/dLHCT 44.0 %MCV 85.0 fLMCH 
28.50 pgMCHC 33.60 g/dLRDW CV 14.90 %MPV 10.50 fLPLT 357 %NEUT 63.70 %%LYMP 
26.0 %%MONO 8.50 %%EOS 1.50 %%BASO 0.30 %#NEUT 7.07 #LYMP 2.88 #MONO 0.94 #EOS 
0.17 #BASO 0.03 TSH 1.140 uIU/mLGLUCOSE 109.0 mg/dLSODIUM 136.0 mmol/LPOTASSIUM 
4.70 mmol/LCHLORIDE 104.0 mmol/LCO2 22.0 mmol/LBUN 20.0 mg/dLCREATININE 0.90 mg/
dLSGOT/AST 14.0 IU/LSGPT/ALT 13.0 IU/LALK PHOS 90.0 IU/LTOTAL PROTEIN 7.20 g/
dLALBUMIN 4.20 g/dLTOTAL BILI 0.40 mg/dLCALCIUM 9.70 mg/dLeGFR >60 mL/min/1.73 
m2

 

 10/6/2014 4:10 PM  Est Avg Glucose 159.9 mg/dLTSH 1.860 uIU/mLWBC 11.5 RBC 
5.21 HGB 14.60 g/dLHCT 43.0 %MCV 83.0 fLMCH 28.0 pgMCHC 34.0 g/dLRDW CV 15.20 %
MPV 10.20 fLPLT 311 %NEUT 65.30 %%LYMP 25.20 %%MONO 8.0 %%EOS 1.20 %%BASO 0.30 %
#NEUT 7.51 #LYMP 2.89 #MONO 0.92 #EOS 0.14 #BASO 0.03 EOS 1.0 %BASO 1.0 %WBC 
11.5 RBC 5.21 HGB 14.60 g/dLHCT 43.0 %MCV 83.0 fLMCH 28.0 pgMCHC 34.0 g/dLRDW 
CV 15.20 %MPV 10.20 fLPLT 311 %NEUT 65.30 %%LYMP 25.20 %%MONO 8.0 %%EOS 1.20 %%
BASO 0.30 %#NEUT 7.51 #LYMP 2.89 #MONO 0.92 #EOS 0.14 #BASO 0.03 GLUCOSE 97.0 mg
/dLSODIUM 136.0 mmol/LPOTASSIUM 4.30 mmol/LCHLORIDE 103.0 mmol/LCO2 22.0 mmol/
LBUN 28.0 mg/dLCREATININE 0.80 mg/dLSGOT/AST 15.0 IU/LSGPT/ALT 12.0 IU/LALK 
PHOS 121.0 IU/LTOTAL PROTEIN 7.60 g/dLALBUMIN 4.10 g/dLTOTAL BILI 0.20 mg/
dLCALCIUM 9.10 mg/dLeGFR 60 

 

 2014 2:40 PM  WBC 9.1 RBC 5.23 HGB 14.90 g/dLHCT 44.0 %MCV 84.0 fLMCH 
28.50 pgMCHC 33.90 g/dLRDW CV 16.0 %MPV 10.30 fLPLT 348 GLUCOSE 68.0 mg/
dLSODIUM 136.0 mmol/LPOTASSIUM 4.30 mmol/LCHLORIDE 102.0 mmol/LCO2 24.0 mmol/
LBUN 12.0 mg/dLCREATININE 0.90 mg/dLSGOT/AST 48.0 IU/LSGPT/.0 IU/LALK 
PHOS 293.0 IU/LTOTAL PROTEIN 7.30 g/dLALBUMIN 4.20 g/dLTOTAL BILI 0.40 mg/
dLCALCIUM 9.70 mg/dLeGFR 60 LIPASE 14.0 U/LAMYLASE 36 IU/L

 

 2014 5:45 AM  GLUCOSE 107.0 mg/dLSODIUM 136.0 mmol/LPOTASSIUM 3.90 mmol/
LCHLORIDE 102.0 mmol/LCO2 23.0 mmol/LBUN 10.0 mg/dLCREATININE 0.70 mg/dLSGOT/
AST 45.0 IU/LSGPT/.0 IU/LALK PHOS 287.0 IU/LTOTAL PROTEIN 6.80 g/
dLALBUMIN 3.60 g/dLTOTAL BILI 0.50 mg/dLCALCIUM 9.50 mg/dLeGFR 60 WBC 21.0 RBC 
5.05 HGB 13.80 g/dLHCT 42.70 %MCV 85.0 fLMCH 27.30 pgMCHC 32.30 g/dLRDW CV 
16.50 %MPV 10.40 fLPLT 301 %NEUT 78.40 %%LYMP 13.90 %%MONO 7.70 %%EOS 0.0 %%
BASO 0.0 %#NEUT 16.48 #LYMP 2.93 #MONO 1.62 #EOS 0.00 #BASO 0.01 ATYP LYMPHS 1+ 

 

 2014 6:05 AM  GLUCOSE 99.0 mg/dLSODIUM 137.0 mmol/LPOTASSIUM 3.60 mmol/
LCHLORIDE 102.0 mmol/LCO2 22.0 mmol/LBUN 9.0 mg/dLCREATININE 0.80 mg/dLSGOT/AST 
20.0 IU/LSGPT/ALT 81.0 IU/LALK PHOS 224.0 IU/LTOTAL PROTEIN 6.90 g/dLALBUMIN 
3.60 g/dLTOTAL BILI 0.60 mg/dLCALCIUM 9.50 mg/dLeGFR 60 WBC 11.2 RBC 4.99 HGB 
13.80 g/dLHCT 41.90 %MCV 84.0 fLMCH 27.70 pgMCHC 32.90 g/dLRDW CV 16.40 %MPV 
10.70 fLPLT 307 %NEUT 65.80 %%LYMP 21.60 %%MONO 8.70 %%EOS 3.70 %%BASO 0.20 %#
NEUT 7.34 #LYMP 2.41 #MONO 0.97 #EOS 0.41 #BASO 0.02 







History Of Immunizations







 Name  Date Admin  Mfg Name  Mfg Code  Trade Name  Lot#  Route  Inj  Vis Given  
Vis Pub  CVX

 

 Influenza  10/18/2014  sanofi pasteur  [employer]  Fluzone Quadrivalent  [id number]  
Intramuscular  Left Deltoid  10/18/2014  2014  141







History of Past Illness







 Name  Date of Onset  Comments

 

 Bipolar Disorder      

 

 Gastroesophageal Reflux      

 

 Chronic Obstructive Pulmonary Disease      

 

 Hypothyroidism, Acquired  10/31/2013   

 

 Abdominal Pain, RUQ      

 

 Screening Mammogram  May  9 2012  9:23AM   

 

 Hyperlipidemia, unspecified  2012  8:37AM   

 

 Cough  2012  8:37AM   

 

 Pneumonia  2012  5:57PM   

 

 Upper Respiratory Infection  2012  4:57PM   

 

 Screening Mammogram For High Risk Patient  Aug 30 2012 10:13AM   

 

 Screening Mammogram  Sep 17 2013  8:10AM   

 

 Hypothyroidism, Acquired  Sep 17 2013  1:30PM   

 

 Chronic kidney disease  Sep 17 2013  1:30PM   

 

 Yeast Of Skin  Sep 17 2013  1:30PM   

 

 Hypothyroidism, Acquired  Oct 31 2013 10:07AM   

 

 Hypothyroidism, Acquired  Oct  6 2014  3:25PM   

 

 Hyperglycemia  Oct  6 2014  3:25PM   

 

 Esophageal Reflux  Oct  6 2014  3:25PM   

 

 Diabetes Mellitus, Type II  Oct 13 2014 10:43AM   

 

 Esophageal Reflux  Oct 13 2014 10:43AM   

 

 Bronchitis, Acute  2014  5:55PM   

 

 Upper Respiratory Infections  2014  5:55PM   

 

 Hernia, incisional  Dec 16 2014  5:12PM   

 

 Abdominal pain  Dec 16 2014  5:12PM   

 

 Abdominal Pain  Dec 17 2014  9:49AM   

 

 Abdominal pain, RUQ  Dec 22 2014  2:02PM   

 

 Chronic Cholecystitis  Dec 22 2014  2:02PM   

 

 Incisional Hernia: No Obstruction Or Gangrene  Dec 22 2014  2:02PM   

 

 Postoperative Follow-up: Cholecystectomy  2015  1:49PM   

 

 Postoperative Follow-Up Visit  2015  1:38PM   

 

 Postoperative Follow-Up Visit  2015  1:56PM   

 

 Bronchitis  Mar 24 2015  6:10PM   

 

 Postoperative Follow-Up Visit  2015  3:23PM   

 

 Screening Mammogram  May 11 2015 12:13PM   

 

 Hypothyroidism, Acquired  Sep 14 2016 10:25AM   

 

 Low Back Pain  Sep 14 2016 10:25AM   

 

 Hyperglycemia  Sep 14 2016 10:25AM   







Payers







 Insurance Name  Company Name  Plan Name  Plan Number  Policy Number  Policy 
Group Number  Start Date

 

    Medicare Part A  Medicare RHC     [id number]     N/A

 

    Medicare Part B  [employer] [place]     [id number]     N/A

 

    [facility]     [id number]     N/A

 

    [place] [employer] [facility]     
[id number]     N/A

 

    Medicare Part A  Medicare Part A     [id number]     N/A

 

    Medicare Part A  Medicare - Lab/Xray     [id number]     N/A







History of Encounters







 Visit Date  Visit Type  Provider

 

 2016  Office visit   

 

 2016  Office visit  Navin Raza MD

 

 3/24/2015  Office visit  Carley BUSH

 

 2015  Office visit  Javier Preston MD

 

 2015  Office visit  Javier Preston MD

 

 2015  Office visit  Javier Preston MD

 

 2015  Office visit  Javier Preston MD

 

 2014  Hospital  Javier Preston MD

 

 2014  [facility]  Javier Preston MD

 

 2014  Office visit   

 

 2014  Office visit  Javier Preston MD

 

 2014  Office visit  Navin Raza MD

 

 2014  Office visit  Rosamaria HUNTER

 

 2014  Office visit  Sugey Hawkins APRN

 

 10/13/2014  Office visit  Navin Raza MD

 

 10/6/2014  Office visit   

 

 10/6/2014  Office visit  Navin Raza MD

 

 10/31/2013  Office visit  Navin Raza MD

 

 2013  Office visit  Navin Raza MD

 

 2012  Office visit  Navin Raza MD

## 2018-11-04 NOTE — XMS REPORT
MU2 Ambulatory Summary

 Created on: 2018



Reina Griggs

External Reference #: [id number]

: 1945

Sex: Female



Demographics







 Address  [address]

[place]  [address]

 

 Home Phone  [phone]

 

 Preferred Language  English

 

 Marital Status  

 

 Rastafarian Affiliation  Unknown

 

 Race  White

 

 Ethnic Group  Not  or 





Author







 Author  Navin Raza

 

 [facility]

 

 Address  [address]

[place], [place]  [id number]



 

 Phone  [phone]







Care Team Providers







 Care Team Member Name  Role  Phone

 

 Navin Raza  PCP  [phone]

 

 Navin Raza  PreferredProvider  [phone]







Allergies and Adverse Reactions







 Name  Reaction  Notes

 

 PENICILLINS      







Plan of Treatment







 Planned Activity  Comments  Planned Date  Planned Time  Plan/Goal

 

 CBC With Auto Differential     2012  12:00 AM   

 

 Chest X-ray, PA and lateral     2012  12:00 AM   

 

 Diagnostic Mammogram     2012  12:00 AM   

 

 Mammography; bilateral     2018  12:00 AM   

 

 Screening mammography, bilateral     2015  12:00 AM   







Medications







 Active 

 

 Name  Start Date  Estimated Completion Date  SIG  Comments

 

 nystatin-triamcinolone 100,000-0.1 unit/g-% topical cream  2013     apply 
to the affected area(s) by topical route 2 times per day in the morning and 
evening   

 

 imipramine HCl 50 mg oral tablet  2014     TAKE 2 TABLETS BY MOUTH EVERY 
MORNING AND 3 TABLETS BY MOUTH EVERY NIGHT AT BEDTIME   

 

 Vesicare 5 mg oral tablet  2014     TAKE 1 TABLET BY MOUTH DAILY   

 

 tramadol 50 mg oral tablet  2014     TAKE 1 TABLET BY MOUTH EVERY 6 HOURS 
AS NEEDED   

 

 imipramine HCl 50 mg oral tablet  2014     TAKE 2 TABLETS BY MOUTH EVERY 
MORNING AND 3 TABLETS BY MOUTH EVERY NIGHT AT BEDTIME   

 

 imipramine HCl 50 mg oral tablet  10/20/2014     TAKE 2 TABLETS BY MOUTH EVERY 
MORNING AND 3 TABLETS BY MOUTH EVERY NIGHT AT BEDTIME   

 

 tramadol 50 mg oral tablet  10/23/2014     TAKE 1 TABLET BY MOUTH EVERY 6 
HOURS AS NEEDED   

 

 Vesicare 5 mg oral tablet  10/23/2014     TAKE 1 TABLET BY MOUTH DAILY   

 

 tramadol 50 mg oral tablet  10/23/2014     TAKE 1 TABLET BY MOUTH EVERY 6 
HOURS AS NEEDED   

 

 Vesicare 5 mg oral tablet  10/23/2014     TAKE 1 TABLET BY MOUTH DAILY   

 

 metoclopramide HCl 10 mg oral tablet  2014     TAKE 1 TABLET BY MOUTH 
FOUR TIMES DAILY 30 MINUTES BEFORE MEALS AND AT BEDTIME   

 

 Abdominal Belt 1 abdominal binder  2015     Use PRN for incisional support
   

 

 imipramine HCl 50 mg oral tablet  2015     TAKE 2 TABLETS BY MOUTH EVERY 
MORNING AND 3 TABLETS BY MOUTH EVERY NIGHT AT BEDTIME   

 

 Vesicare 5 mg oral tablet  3/10/2015     TAKE 1 TABLET BY MOUTH DAILY   

 

 tramadol 50 mg oral tablet  2015     TAKE 1 TABLET BY MOUTH EVERY 6 HOURS 
AS NEEDED   

 

 tramadol 50 mg oral tablet  2015     TAKE 1 TABLET BY MOUTH EVERY 6 HOURS 
AS NEEDED   

 

 imipramine HCl 50 mg oral tablet  2015     TAKE 2 TABLETS BY MOUTH EVERY 
MORNING AND 3 TABLETS BY MOUTH EVERY NIGHT AT BEDTIME   

 

 metoclopramide HCl 10 mg oral tablet  2015     TAKE 1 TABLET BY MOUTH 
FOUR TIMES DAILY 30 MINUTES BEFORE MEALS AND AT BEDTIME   

 

 levothyroxine 50 mcg oral tablet  2015     TAKE 1 TABLET BY MOUTH ONCE 
DAILY   

 

 imipramine HCl 50 mg oral tablet  2015     TAKE 2 TABLETS BY MOUTH EVERY 
MORNING AND 3 TABLETS BY MOUTH EVERY NIGHT AT BEDTIME   

 

 Vesicare 5 mg oral tablet  2016     TAKE 1 TABLET BY MOUTH DAILY   

 

 metoclopramide HCl 10 mg oral tablet  2016     TAKE 1 TABLET BY MOUTH 
FOUR TIMES DAILY 30 MINUTES BEFORE MEALS AND AT BEDTIME   

 

 imipramine HCl 50 mg oral tablet  2016     TAKE 2 TABLETS BY MOUTH EVERY 
MORNING AND 3 TABLETS BY MOUTH EVERY NIGHT AT BEDTIME   

 

 imipramine HCl 50 mg oral tablet  2016     TAKE 2 TABLETS BY MOUTH EVERY 
MORNING AND 3 TABLETS BY MOUTH EVERY NIGHT AT BEDTIME   

 

 ProAir HFA 90 mcg/actuation inhalation HFA aerosol inhaler  2016     
inhale 1 - 2 puffs (90 - 180 mcg) by inhalation route every 6 hours as needed   

 

 ProAir HFA 90 mcg/actuation inhalation HFA aerosol inhaler  10/31/2016     
INHALE 1 TO 2 PUFFS BY MOUTH EVERY 6 HOURS AS NEEDED   

 

 Nebulizer and accessories  10/31/2016     Use as directed DX: COPD RAFAEL: 99   

 

 albuterol sulfate 2.5 mg /3 mL (0.083 %) inhalation solution for nebulization  
10/31/2016     inhale 3 milliliters (2.5 mg) by nebulization route every 8 
hours as needed   

 

 ProAir HFA 90 mcg/actuation inhalation HFA aerosol inhaler  10/31/2016     
INHALE 1 - 2 PUFFS (90 - 180 MCG) BY INHALATION ROUTE EVERY 6 HOURS AS NEEDED   

 

 Vesicare 5 mg oral tablet  2017     TAKE 1 TABLET BY MOUTH DAILY   

 

 imipramine HCl 50 mg oral tablet  2017     TAKE 2 TABLETS BY MOUTH EVERY 
MORNING AND 3 TABLETS BY MOUTH EVERY NIGHT AT BEDTIME   

 

 imipramine HCl 50 mg oral tablet  2017     TAKE 2 TABLETS BY MOUTH EVERY 
MORNING AND 3 EVERY NIGHT AT BEDTIME   

 

 metoclopramide HCl 10 mg oral tablet  2017  TAKE 1 TABLET BY 
MOUTH FOUR TIMES DAILY 30 MINUTES BEFORE MEALS AND AT BEDTIME FOR 30 DAYS   

 

 levothyroxine 50 mcg oral tablet  2017  TAKE 1 TABLET BY 
MOUTH EVERY DAY. NEED APPOINTMENT FOR FURTHER REFILLS   

 

 Zetia 10 mg oral tablet  2018  1/3/2019  take 1 tablet (10 mg) by oral 
route once daily for 30 days   

 

 tramadol 50 mg oral tablet  2018     take 1 tablet by oral route every 4 
hours as needed   









  

 

 Name  Start Date  Expiration Date  SIG  Comments

 

 tramadol 50 mg oral tablet  2013  1/15/2014  take 1 tablet (50 mg) by 
oral route every 6 hours as needed for 30 days   

 

 Reglan 10 mg oral tablet  2014  take 1 tablet (10 mg) by oral 
route 4 times per day 30 minutes before meals and at bedtime   

 

 omeprazole 40 mg oral capsule,delayed release(DR/EC)  10/13/2014  10/8/2015  
take 1 capsule (40 mg) by oral route once daily before a meal for 30 days   

 

 Synthroid 50 mcg oral tablet  10/23/2014  10/18/2015  take 1 tablet (50 mcg) 
by oral route once daily for 90 days   

 

 Zithromax Z-Paul 250 mg oral tablet  2014  Take 2 tablets the 
first day (500 mg) followed by 1 tablet (250 mg) days 2-5. for 5 days   

 

 Medrol (Paul) 4 mg oral tablets,dose pack  2014     take as directed   

 

 azithromycin 250 mg oral tablet  3/24/2015  3/29/2015  take 2 tablets (500 mg) 
by oral route once daily for 1 day then 1 tablet (250 mg) by oral route once 
daily for 4 days   

 

 Bactrim -160 mg oral tablet  2016  10/6/2016  take 1 tablet by oral 
route 2 times per day for 7 days   









 Discontinued 

 

 Name  Start Date  Discontinued Date  SIG  Comments

 

 nystatin-triamcinolone 100,000-0.1 unit/g-% topical cream  2014
  APPLY TO THE AFFECTED AREA TWICE DAILY , IN MORNING AND EVENING   

 

 Synthroid 50 mcg oral tablet  10/23/2014  2016  TAKE 1 TABLET (50 MCG) BY 
ORAL ROUTE ONCE DAILY FOR 90 DAYS   

 

 Reglan 10 mg oral tablet  2014  TAKE 1 TABLET (10 MG) BY 
ORAL ROUTE 4 TIMES PER DAY 30 MINUTES BEFORE MEALS AND AT BEDTIME   

 

 oxycodone 15 mg oral tablet  2014  take 1 tablet (15 mg) by 
oral route every 6 hours   

 

 Medrol (Paul) 4 mg oral tablets,dose pack  3/24/2015  2016  take as 
directed   







Problem List







 Description  Status  Onset

 

 Bipolar Disorder  Active   

 

 Chronic Obstructive Pulmonary Disease  Active   

 

 Gastroesophageal Reflux  Active   

 

 Hypothyroidism, Acquired  Active  10/31/2013

 

 Abdominal Pain, RUQ  Active   







Vital Signs







 Date  Time  BP-Sys(mm[Hg]  BP-Sabrina(mm[Hg])  HR(bpm)  RR(rpm)  Temp  WT  HT  HC  
BMI  BSA  BMI Percentile  O2 Sat(%)

 

 2018  9:00:00 AM  148 mmHg  80 mmHg  78 bpm  16 rpm  96.2 F  168 lbs  66.5 
in     26.71 kg/m2  1.89 m2     99 %

 

 10/26/2016  5:04:00 PM  132 mmHg  74 mmHg  84 bpm  18 rpm  97.8 F  176.125 lbs
                 95 %

 

 2016  7:10:00 PM  124 mmHg  68 mmHg  94 bpm  20 rpm  99 F  169 lbs  66.5 
in     26.87 kg/m2  1.90 m2     95 %

 

 2016  10:18:00 AM  148 mmHg  80 mmHg  82 bpm  18 rpm  97.8 F  169 lbs  
66.5 in     26.8684 kg/m  1.8965 m     97 %

 

 3/24/2015  6:08:00 PM  120 mmHg  78 mmHg  89 bpm  20 rpm  98.3 F  160 lbs     
            96 %

 

 2015  1:55:00 PM  154 mmHg  80 mmHg  88 bpm  20 rpm  97.3 F     66.5 in  
             

 

 2015  1:36:00 PM  166 mmHg  82 mmHg  94 bpm  24 rpm  96.2 F  162 lbs  66.5 
in     25.76 kg/m2  1.86 m2     98 %

 

 2015  3:17:00 PM  140 mmHg  76 mmHg  95 bpm  20 rpm  96 F                 
   99 %

 

 2015  1:48:00 PM  160 mmHg  78 mmHg  87 bpm  24 rpm  96.5 F               
     94 %

 

 2014  1:41:00 PM  156 mmHg  84 mmHg  94 bpm  20 rpm  96.3 F  166 lbs  
66.5 in     26.3914 kg/m  1.8796 m     97 %

 

 2014  9:43:00 AM  142 mmHg  64 mmHg  100 bpm  18 rpm  97.2 F  166 lbs  
66.5 in     26.3914 kg/m  1.88 m2      

 

 2014  5:05:00 PM  150 mmHg  62 mmHg  99 bpm  20 rpm  97.6 F  170 lbs  
66.5 in     27.03 kg/m2  1.9021 m     97 %

 

 2014  5:54:00 PM  145 mmHg  90 mmHg  93 bpm  18 rpm  97.5 F  170 lbs  
66.5 in     27.0274 kg/m  1.90 m2     96 %

 

 10/6/2014  3:21:00 PM  140 mmHg  82 mmHg  82 bpm  18 rpm  97.6 F  170 lbs  
66.5 in     27.03 kg/m2  1.9021 m      

 

 10/31/2013  10:02:00 AM  118 mmHg  64 mmHg  78 bpm  18 rpm  98 F  161 lbs  
66.5 in     25.5965 kg/m  1.85 m2      

 

 2013  1:28:00 PM  150 mmHg  80 mmHg  84 bpm  16 rpm  98.1 F  161 lbs  
66.5 in     25.60 kg/m2  1.8511 m     97 %

 

 2012  8:27:00 AM  136 mmHg  68 mmHg  72 bpm  18 rpm  97.6 F  157 lbs  
66.5 in     24.9606 kg/m  1.83 m2      







Social History







 Name  Description  Comments

 

 Tobacco  Current every day smoker  10/26/2016 -  







History of Procedures







 Date Ordered  Description  Order Status

 

 2016 12:00 AM  COMPLETE CBC W/AUTO DIFF WBC  Reviewed

 

 2016 12:00 AM  COMPREHEN METABOLIC PANEL  Reviewed

 

 2016 12:00 AM  LIPID PANEL  Reviewed

 

 2016 12:00 AM  GLYCOSYLATED HEMOGLOBIN TEST  Reviewed

 

 2016 12:00 AM  ASSAY THYROID STIM HORMONE  Reviewed

 

 2016 12:00 AM  AIRWAY INHALATION TREATMENT  Reviewed

 

 10/26/2016 12:00 AM  COMPLETE CBC W/AUTO DIFF WBC  Reviewed

 

 10/26/2016 12:00 AM  COMPREHEN METABOLIC PANEL  Reviewed

 

 10/26/2016 12:00 AM  CHEST X-RAY 2VW FRONTAL&LATL  Reviewed

 

 2012 12:00 AM  COMPUTER DX MAMMOGRAM ADD-ON  Reviewed

 

 2012 12:00 AM  COMPREHEN METABOLIC PANEL  Reviewed

 

 2012 12:00 AM  LIPID PANEL  Reviewed

 

 2012 12:00 AM  CHEST X-RAY 2VW FRONTAL&LATL  Reviewed

 

 2012 12:00 AM  CHEST X-RAY 2VW FRONTAL&LATL  Reviewed

 

 2013 12:00 AM  MAMMOGRAM SCREENING  Reviewed

 

 2013 12:00 AM  COMPLETE CBC W/AUTO DIFF WBC  Reviewed

 

 2013 12:00 AM  COMPREHEN METABOLIC PANEL  Reviewed

 

 2013 12:00 AM  ASSAY THYROID STIM HORMONE  Reviewed

 

 10/6/2014 12:00 AM  COMPLETE CBC W/AUTO DIFF WBC  Reviewed

 

 10/6/2014 12:00 AM  COMPREHEN METABOLIC PANEL  Reviewed

 

 10/6/2014 12:00 AM  GLYCOSYLATED HEMOGLOBIN TEST  Reviewed

 

 10/6/2014 12:00 AM  ASSAY THYROID STIM HORMONE  Reviewed

 

 10/6/2014 12:00 AM  INFLUENZA VIRUS VACCINE SPLIT VIRUS 3/> YRS IM  Reviewed

 

 2014 12:00 AM  CT ABDOMEN W/O & W/DYE  Reviewed

 

 2014 12:00 AM  CT PELVIS W/O & W/DYE  Reviewed

 

 2014 12:00 AM  CT ABD & PELV 1/> REGNS  Reviewed

 

 2014 12:00 AM  ECHO EXAM OF ABDOMEN  Reviewed

 

 2014 12:00 AM  COMPLETE CBC AUTOMATED  Reviewed

 

 2014 12:00 AM  COMPREHEN METABOLIC PANEL  Reviewed

 

 2014 12:00 AM  ASSAY OF AMYLASE  Reviewed

 

 2014 12:00 AM  ASSAY OF LIPASE  Reviewed

 

 2014 12:00 AM  COMPLETE CBC W/AUTO DIFF WBC  Reviewed







Results Summary







 Date and Description  Results

 

 2012 11:18 AM  GLUCOSE 97.0 mg/dLSODIUM 136.0 mmol/LPOTASSIUM 4.20 mmol/
LCHLORIDE 102.0 mmol/LCO2 22.0 mmol/LBUN 7.0 mg/dLCREATININE 1.0 mg/dLSGOT/AST 
12.0 IU/LSGPT/ALT 6.0 IU/LALK PHOS 128.0 IU/LTOTAL PROTEIN 7.40 g/dLALBUMIN 
4.20 g/dLTOTAL BILI 0.40 mg/dLCALCIUM 9.30 mg/dLAGE 66 GFR NonAA 55 GFR AA 67 
eGFR 55 eGFR AA* 60 TRIGLYCERIDES 299.0 mg/dLCHOLESTEROL 293.0 mg/dLHDL 33.0 mg/
dLTOT CHOL/HDL 8.9 LDL (CALC) 200.0 mg/dL

 

 10/24/2013 12:32 PM  WBC 11.1 RBC 5.19 HGB 14.80 g/dLHCT 44.0 %MCV 85.0 fLMCH 
28.50 pgMCHC 33.60 g/dLRDW SD 46 RDW CV 14.90 %MPV 10.50 fLPLT 357 NRBC# 0.00 
NRBC% 0.0 %NEUT 63.70 %%LYMP 26.0 %%MONO 8.50 %%EOS 1.50 %%BASO 0.30 %#NEUT 
7.07 #LYMP 2.88 #MONO 0.94 #EOS 0.17 #BASO 0.03 MANUAL DIFF NOT IND TSH 1.140 
uIU/mLGLUCOSE 109.0 mg/dLSODIUM 136.0 mmol/LPOTASSIUM 4.70 mmol/LCHLORIDE 104.0 
mmol/LCO2 22.0 mmol/LBUN 20.0 mg/dLCREATININE 0.90 mg/dLSGOT/AST 14.0 IU/LSGPT/
ALT 13.0 IU/LALK PHOS 90.0 IU/LTOTAL PROTEIN 7.20 g/dLALBUMIN 4.20 g/dLTOTAL 
BILI 0.40 mg/dLCALCIUM 9.70 mg/dLAGE 68 GFR NonAA 62 GFR AA 75 eGFR >60 mL/min/
1.73 m2eGFR AA* >60 

 

 10/6/2014 4:10 PM  HGB A1C 7.20 %Est Avg Glucose 159.9 mg/dLTSH 1.860 uIU/
mLWBC 11.5 RBC 5.21 HGB 14.60 g/dLHCT 43.0 %MCV 83.0 fLMCH 28.0 pgMCHC 34.0 g/
dLRDW SD 46 RDW CV 15.20 %MPV 10.20 fLPLT 311 NRBC# 0.00 NRBC% 0.0 %NEUT 65.30 %
%LYMP 25.20 %%MONO 8.0 %%EOS 1.20 %%BASO 0.30 %#NEUT 7.51 #LYMP 2.89 #MONO 0.92 
#EOS 0.14 #BASO 0.03 MANUAL DIFF SEE BELOW SEGS 69 LYMPHS 20 MONOS 9 EOS 1.0 %
BASO 1.0 %WBC 11.5 RBC 5.21 HGB 14.60 g/dLHCT 43.0 %MCV 83.0 fLMCH 28.0 pgMCHC 
34.0 g/dLRDW SD 46 RDW CV 15.20 %MPV 10.20 fLPLT 311 NRBC# 0.00 NRBC% 0.0 %NEUT 
65.30 %%LYMP 25.20 %%MONO 8.0 %%EOS 1.20 %%BASO 0.30 %#NEUT 7.51 #LYMP 2.89 #
MONO 0.92 #EOS 0.14 #BASO 0.03 MANUAL DIFF PENDING GLUCOSE 97.0 mg/dLSODIUM 
136.0 mmol/LPOTASSIUM 4.30 mmol/LCHLORIDE 103.0 mmol/LCO2 22.0 mmol/LBUN 28.0 mg
/dLCREATININE 0.80 mg/dLSGOT/AST 15.0 IU/LSGPT/ALT 12.0 IU/LALK PHOS 121.0 IU/
LTOTAL PROTEIN 7.60 g/dLALBUMIN 4.10 g/dLTOTAL BILI 0.20 mg/dLCALCIUM 9.10 mg/
dLAGE 69 GFR NonAA 71 GFR AA 86 eGFR 60 eGFR AA* 60 

 

 2014 2:40 PM  WBC 9.1 RBC 5.23 HGB 14.90 g/dLHCT 44.0 %MCV 84.0 fLMCH 
28.50 pgMCHC 33.90 g/dLRDW SD 48 RDW CV 16.0 %MPV 10.30 fLPLT 348 NRBC# 0.00 
NRBC% 0.0 GLUCOSE 68.0 mg/dLSODIUM 136.0 mmol/LPOTASSIUM 4.30 mmol/LCHLORIDE 
102.0 mmol/LCO2 24.0 mmol/LBUN 12.0 mg/dLCREATININE 0.90 mg/dLSGOT/AST 48.0 IU/
LSGPT/.0 IU/LALK PHOS 293.0 IU/LTOTAL PROTEIN 7.30 g/dLALBUMIN 4.20 g/
dLTOTAL BILI 0.40 mg/dLCALCIUM 9.70 mg/dLAGE 69 GFR NonAA 62 GFR AA 75 eGFR 60 
eGFR AA* 60 LIPASE 14.0 U/LAMYLASE 36 IU/L

 

 2014 5:45 AM  GLUCOSE 107.0 mg/dLSODIUM 136.0 mmol/LPOTASSIUM 3.90 mmol/
LCHLORIDE 102.0 mmol/LCO2 23.0 mmol/LBUN 10.0 mg/dLCREATININE 0.70 mg/dLSGOT/
AST 45.0 IU/LSGPT/.0 IU/LALK PHOS 287.0 IU/LTOTAL PROTEIN 6.80 g/
dLALBUMIN 3.60 g/dLTOTAL BILI 0.50 mg/dLCALCIUM 9.50 mg/dLAGE 69 GFR NonAA 83 
GFR  eGFR 60 eGFR AA* 60 WBC 21.0 RBC 5.05 HGB 13.80 g/dLHCT 42.70 %MCV 
85.0 fLMCH 27.30 pgMCHC 32.30 g/dLRDW SD 50 RDW CV 16.50 %MPV 10.40 fLPLT 301 
NRBC# 0.00 NRBC% 0.0 %NEUT 78.40 %%LYMP 13.90 %%MONO 7.70 %%EOS 0.0 %%BASO 0.0 %
#NEUT 16.48 #LYMP 2.93 #MONO 1.62 #EOS 0.00 #BASO 0.01 MANUAL DIFF SEE BELOW 
SEGS 75 BANDS 1 LYMPHS 17 MONOS 7 ATYP LYMPHS 1+ 

 

 2016 11:05 AM  WBC 10.0 RBC 5.49 HGB 15.50 g/dLHCT 47.60 %MCV 87.0 fLMCH 
28.20 pgMCHC 32.60 g/dLRDW SD 48 RDW CV 15.30 %MPV 9.80 fLPLT 290 NRBC# 0.00 
NRBC% 0.0 %NEUT 58.30 %%LYMP 32.50 %%MONO 7.40 %%EOS 0.80 %%BASO 0.50 %#NEUT 
5.83 #LYMP 3.25 #MONO 0.74 #EOS 0.08 #BASO 0.05 MANUAL DIFF NOT IND HGB A1C 
5.60 %Est Avg Glucose 114.0 mg/dLTSH 2.360 uIU/mLGLUCOSE 96.0 mg/dLSODIUM 137.0 
mmol/LPOTASSIUM 4.70 mmol/LCHLORIDE 102.0 mmol/LCO2 25.0 mmol/LBUN 17.0 mg/
dLCREATININE 1.0 mg/dLSGOT/AST 16.0 IU/LSGPT/ALT 13.0 IU/LALK PHOS 109.0 IU/
LTOTAL PROTEIN 7.30 g/dLALBUMIN 4.60 g/dLTOTAL BILI 0.40 mg/dLCALCIUM 9.70 mg/
dLAGE 71 GFR NonAA 55 GFR AA 67 eGFR 55 eGFR AA* >60 CHOLESTEROL 292.0 mg/dL

 

 10/26/2016 5:39 PM  WBC 11.6 RBC 5.52 HGB 15.50 g/dLHCT 48.40 %MCV 88.0 fLMCH 
28.10 pgMCHC 32.0 g/dLRDW SD 47 RDW CV 14.60 %MPV 9.70 fLPLT 298 NRBC# 0.00 NRBC
% 0.0 %NEUT 53.80 %%LYMP 32.20 %%MONO 8.80 %%EOS 4.10 %%BASO 0.60 %#NEUT 6.22 #
LYMP 3.72 #MONO 1.02 #EOS 0.48 #BASO 0.07 MANUAL DIFF NOT IND GLUCOSE 92.0 mg/
dLSODIUM 138.0 mmol/LPOTASSIUM 4.50 mmol/LCHLORIDE 99.0 mmol/LCO2 26.0 mmol/
LBUN 13.0 mg/dLCREATININE 1.0 mg/dLSGOT/AST 15.0 IU/LSGPT/ALT 15.0 IU/LALK PHOS 
125.0 IU/LTOTAL PROTEIN 7.50 g/dLALBUMIN 4.30 g/dLTOTAL BILI 0.30 mg/dLCALCIUM 
9.60 mg/dLAGE 71 GFR NonAA 55 GFR AA 67 eGFR 55 eGFR AA* >60 







History Of Immunizations







 Name  Date Admin  Mfg Name  Mfg Code  Trade Name  Lot#  Route  Inj  Vis Given  
Vis Pub  CVX

 

 Influenza  10/18/2014  sanofi pasteur  [employer]  Fluzone Quadrivalent  [id number]  
Intramuscular  Left Deltoid  10/18/2014  2014  141







History of Past Illness







 Name  Date of Onset  Comments

 

 Bipolar Disorder      

 

 Gastroesophageal Reflux      

 

 Chronic Obstructive Pulmonary Disease      

 

 Hypothyroidism, Acquired  10/31/2013   

 

 Abdominal Pain, RUQ      

 

 Screening Mammogram  May  9 2012  9:23AM   

 

 Hyperlipidemia, unspecified  2012  8:37AM   

 

 Cough  2012  8:37AM   

 

 Pneumonia  2012  5:57PM   

 

 Upper Respiratory Infection  2012  4:57PM   

 

 Screening Mammogram For High Risk Patient  Aug 30 2012 10:13AM   

 

 Screening Mammogram  Sep 17 2013  8:10AM   

 

 Hypothyroidism, Acquired  Sep 17 2013  1:30PM   

 

 Chronic kidney disease  Sep 17 2013  1:30PM   

 

 Yeast Of Skin  Sep 17 2013  1:30PM   

 

 Hypothyroidism, Acquired  Oct 31 2013 10:07AM   

 

 Hypothyroidism, Acquired  Oct  6 2014  3:25PM   

 

 Hyperglycemia  Oct  6 2014  3:25PM   

 

 Esophageal Reflux  Oct  6 2014  3:25PM   

 

 Diabetes Mellitus, Type II  Oct 13 2014 10:43AM   

 

 Esophageal Reflux  Oct 13 2014 10:43AM   

 

 Bronchitis, Acute  2014  5:55PM   

 

 Upper Respiratory Infections  2014  5:55PM   

 

 Hernia, incisional  Dec 16 2014  5:12PM   

 

 Abdominal pain  Dec 16 2014  5:12PM   

 

 Abdominal Pain  Dec 17 2014  9:49AM   

 

 Abdominal pain, RUQ  Dec 22 2014  2:02PM   

 

 Chronic Cholecystitis  Dec 22 2014  2:02PM   

 

 Incisional Hernia: No Obstruction Or Gangrene  Dec 22 2014  2:02PM   

 

 Postoperative Follow-up: Cholecystectomy  2015  1:49PM   

 

 Postoperative Follow-Up Visit  2015  1:38PM   

 

 Postoperative Follow-Up Visit  2015  1:56PM   

 

 Bronchitis  Mar 24 2015  6:10PM   

 

 Postoperative Follow-Up Visit  2015  3:23PM   

 

 Screening Mammogram  May 11 2015 12:13PM   

 

 Hypothyroidism, Acquired  Sep 14 2016 10:25AM   

 

 Low Back Pain  Sep 14 2016 10:25AM   

 

 Hyperglycemia  Sep 14 2016 10:25AM   

 

 Bronchitis  Sep 29 2016  7:12PM   

 

 Wheezing  Sep 29 2016  7:12PM   

 

 Coughing  Sep 29 2016  7:12PM   

 

 Cough  Oct 26 2016  5:06PM   

 

 Shortness of breath  Oct 26 2016  5:06PM   

 

 Chronic obstructive pulmonary disease, unspecified COPD type  Oct 26 2016  5:
06PM   

 

 Encounter for screening mammogram for breast cancer  2018 10:38AM   

 

 Hypertension  2018  9:09AM   

 

 Hyperlipidemia, unspecified  2018  9:09AM   

 

 Fibromyalgia  2018  9:09AM   







Payers







 Insurance Name  Company Name  Plan Name  Plan Number  Policy Number  Policy 
Group Number  Start Date

 

    Medicare RHC  Medicare R[employer]     [id number]     N/A

 

    Medicare Part B  Medicare [facility]     [id number]     N/A

 

    BCBS  Bc[employer] [facility]     [id number]     N/A

 

    [place] [employer] [facility] Medical Assistance Prog     
[id number]     N/A

 

    Medicare Part A  Medicare Part A     [id number]     N/A

 

    Medicare Part A  Medicare - Lab/Xray     [id number]     N/A







History of Encounters







 Visit Date  Visit Type  Provider

 

 2018  Office visit  Navin Raza MD

 

 10/26/2016  Office visit  Saqib Bustillos APRN

 

 2016  Office visit  Saqib Bustillos APRN

 

 2016  Office visit   

 

 2016  Office visit  Navin Raza MD

 

 3/24/2015  Office visit  Carley BUSH

 

 2015  Office visit  Javier Preston MD

 

 2015  Office visit  Javier Preston MD

 

 2015  Office visit  Javier Preston MD

 

 2015  Office visit  Javier Preston MD

 

 2014  Hospital  Javier Preston MD

 

 2014  Hospital  Javier Preston MD

 

 2014  Office visit   

 

 2014  Office visit  Javier Preston MD

 

 2014  Office visit  Navin Rzaa MD

 

 2014  Office visit  Rosamaria Maria APRN

 

 2014  Office visit  Sugey Hawkins APRN

 

 10/13/2014  Office visit  Navin Raza MD

 

 10/6/2014  Office visit   

 

 10/6/2014  Office visit  Navin Raza MD

 

 10/31/2013  Office visit  Navin Raza MD

 

 2013  Office visit  Navin Raza MD

 

 2012  Office visit  Navin Raza MD

## 2018-11-04 NOTE — XMS REPORT
MU2 Ambulatory Summary

 Created on: 2015



Reina Griggs

External Reference #: [id number]

: 1945

Sex: Female



Demographics







 Address  [address]

[place]  [address]

 

 Home Phone  [phone]

 

 Preferred Language  English

 

 Marital Status  

 

 Hoahaoism Affiliation  Unknown

 

 Race  White

 

 Ethnic Group  Not  or 





Author







 Author  Navin Raza

 

 [facility]

 

 Address  [address]

[place], [place]  [id number]



 

 Phone  [phone]







Care Team Providers







 Care Team Member Name  Role  Phone

 

 Navin Raza  PCP  Unavailable







Allergies and Adverse Reactions







 Name  Reaction  Notes

 

 PENICILLINS      







Plan of Treatment







 Planned Activity  Comments  Planned Date  Planned Time  Plan/Goal

 

 COMPLETE CBC W/AUTO DIFF WBC     2012  12:00 AM   

 

 CHEST X-RAY 2VW FRONTAL&LATL     2012  12:00 AM   

 

 COMPUTER DX MAMMOGRAM ADD-ON     2012  12:00 AM   

 

 MAMMOGRAM SCREENING     2015  12:00 AM   







Medications







 Active 

 

 Name  Start Date  Estimated Completion Date  SIG  Comments

 

 nystatin-triamcinolone Topical Cream 100,000-0.1 unit/g-%  2013     apply 
to the affected area(s) by topical route 2 times per day in the morning and 
evening   

 

 imipramine HCl oral tablet 50 mg  2014     TAKE 2 TABLETS BY MOUTH EVERY 
MORNING AND 3 TABLETS BY MOUTH EVERY NIGHT AT BEDTIME   

 

 Vesicare oral tablet 5 mg  2014     TAKE 1 TABLET BY MOUTH DAILY   

 

 nystatin-triamcinolone topical cream 100,000-0.1 unit/g-%  2014     APPLY 
TO THE AFFECTED AREA TWICE DAILY , IN MORNING AND EVENING   

 

 tramadol oral tablet 50 mg  2014     TAKE 1 TABLET BY MOUTH EVERY 6 HOURS 
AS NEEDED   

 

 imipramine HCl oral tablet 50 mg  2014     TAKE 2 TABLETS BY MOUTH EVERY 
MORNING AND 3 TABLETS BY MOUTH EVERY NIGHT AT BEDTIME   

 

 omeprazole oral capsule,delayed release(DR/EC) 40 mg  10/13/2014  10/8/2015  
take 1 capsule (40 mg) by oral route once daily before a meal for 30 days   

 

 imipramine HCl oral tablet 50 mg  10/20/2014     TAKE 2 TABLETS BY MOUTH EVERY 
MORNING AND 3 TABLETS BY MOUTH EVERY NIGHT AT BEDTIME   

 

 Synthroid Oral tablet 50 mcg  10/23/2014  10/18/2015  take 1 tablet (50 mcg) 
by oral route once daily for 90 days   

 

 tramadol oral tablet 50 mg  10/23/2014     TAKE 1 TABLET BY MOUTH EVERY 6 
HOURS AS NEEDED   

 

 Vesicare oral tablet 5 mg  10/23/2014     TAKE 1 TABLET BY MOUTH DAILY   

 

 tramadol oral tablet 50 mg  10/23/2014     TAKE 1 TABLET BY MOUTH EVERY 6 
HOURS AS NEEDED   

 

 Vesicare oral tablet 5 mg  10/23/2014     TAKE 1 TABLET BY MOUTH DAILY   

 

 Synthroid oral tablet 50 mcg  10/23/2014     TAKE 1 TABLET (50 MCG) BY ORAL 
ROUTE ONCE DAILY FOR 90 DAYS   

 

 metoclopramide HCl oral tablet 10 mg  2014     TAKE 1 TABLET BY MOUTH 
FOUR TIMES DAILY 30 MINUTES BEFORE MEALS AND AT BEDTIME   

 

 oxycodone oral tablet 15 mg  2014     take 1 tablet (15 mg) by oral 
route every 6 hours   

 

 Abdominal Belt 1 abdominal binder  2015     Use PRN for incisional support
   

 

 imipramine HCl oral tablet 50 mg  2015     TAKE 2 TABLETS BY MOUTH EVERY 
MORNING AND 3 TABLETS BY MOUTH EVERY NIGHT AT BEDTIME   

 

 Vesicare oral tablet 5 mg  3/10/2015     TAKE 1 TABLET BY MOUTH DAILY   

 

 Medrol (Paul) oral tablets,dose pack 4 mg  3/24/2015     take as directed   









  

 

 Name  Start Date  Expiration Date  SIG  Comments

 

 tramadol Oral tablet 50 mg  2013  1/15/2014  take 1 tablet (50 mg) by 
oral route every 6 hours as needed for 30 days   

 

 Reglan Oral Tablet 10 mg  2014  take 1 tablet (10 mg) by oral 
route 4 times per day 30 minutes before meals and at bedtime   

 

 Zithromax Z-Paul oral tablet 250 mg  2014  Take 2 tablets the 
first day (500 mg) followed by 1 tablet (250 mg) days 2-5. for 5 days   

 

 Medrol (Paul) oral tablets,dose pack 4 mg  2014     take as directed   

 

 azithromycin oral tablet 250 mg  3/24/2015  3/29/2015  take 2 tablets (500 mg) 
by oral route once daily for 1 day then 1 tablet (250 mg) by oral route once 
daily for 4 days   









 Discontinued 

 

 Name  Start Date  Discontinued Date  SIG  Comments

 

 Reglan oral tablet 10 mg  2014  TAKE 1 TABLET (10 MG) BY 
ORAL ROUTE 4 TIMES PER DAY 30 MINUTES BEFORE MEALS AND AT BEDTIME   







Problem List







 Description  Status  Onset

 

 Bipolar Disorder  Active   

 

 Chronic Obstructive Pulmonary Disease  Active   

 

 Gastroesophageal Reflux  Active   

 

 Hypothyroidism, Acquired  Active  10/31/2013

 

 Abdominal pain, RUQ  Active   







Vital Signs







 Date  Time  BP-Sys(mm[Hg]  BP-Sabrina(mm[Hg])  HR(bpm)  RR(rpm)  Temp  WT  HT  HC  
BMI  BSA  BMI Percentile  O2 Sat(%)

 

 3/24/2015  6:08:00 PM  120 mmHg  78 mmHg  89 bpm  20 rpm  98.3 F  160 lbs     
            96 %

 

 2015  1:55:00 PM  154 mmHg  80 mmHg  88 bpm  20 rpm  97.3 F     66.5 in  
             

 

 2015  1:36:00 PM  166 mmHg  82 mmHg  94 bpm  24 rpm  96.2 F  162 lbs  66.5 
in     25.76 kg/m2  1.86 m2     98 %

 

 2015  3:17:00 PM  140 mmHg  76 mmHg  95 bpm  20 rpm  96 F                 
   99 %

 

 2015  1:48:00 PM  160 mmHg  78 mmHg  87 bpm  24 rpm  96.5 F               
     94 %

 

 2014  1:41:00 PM  156 mmHg  84 mmHg  94 bpm  20 rpm  96.3 F  166 lbs  
66.5 in     26.3914 kg/m  1.8796 m     97 %

 

 2014  9:43:00 AM  142 mmHg  64 mmHg  100 bpm  18 rpm  97.2 F  166 lbs  
66.5 in     26.39 kg/m2  1.88 m2      

 

 2014  5:05:00 PM  150 mmHg  62 mmHg  99 bpm  20 rpm  97.6 F  170 lbs  
66.5 in     27.0274 kg/m  1.9021 m     97 %

 

 2014  5:54:00 PM  145 mmHg  90 mmHg  93 bpm  18 rpm  97.5 F  170 lbs  
66.5 in     27.03 kg/m2  1.90 m2     96 %

 

 10/6/2014  3:21:00 PM  140 mmHg  82 mmHg  82 bpm  18 rpm  97.6 F  170 lbs  
66.5 in     27.0274 kg/m  1.9021 m      

 

 10/31/2013  10:02:00 AM  118 mmHg  64 mmHg  78 bpm  18 rpm  98 F  161 lbs  
66.5 in     25.60 kg/m2  1.85 m2      

 

 2013  1:28:00 PM  150 mmHg  80 mmHg  84 bpm  16 rpm  98.1 F  161 lbs  
66.5 in     25.5965 kg/m  1.8511 m     97 %

 

 2012  8:27:00 AM  136 mmHg  68 mmHg  72 bpm  18 rpm  97.6 F  157 lbs  
66.5 in     24.96 kg/m2  1.83 m2      







Social History







 Name  Description  Comments

 

 Tobacco  Current every day smoker   







History of Procedures







 Date Ordered  Description  Order Status

 

 2012 12:00 AM  COMPUTER DX MAMMOGRAM ADD-ON  Returned

 

 2012 12:00 AM  COMPREHEN METABOLIC PANEL  Reviewed

 

 2012 12:00 AM  LIPID PANEL  Reviewed

 

 2012 12:00 AM  CHEST X-RAY 2VW FRONTAL&LATL  Reviewed

 

 2012 12:00 AM  CHEST X-RAY 2VW FRONTAL&LATL  Returned

 

 2013 12:00 AM  MAMMOGRAM SCREENING  Reviewed

 

 2013 12:00 AM  COMPLETE CBC W/AUTO DIFF WBC  Reviewed

 

 2013 12:00 AM  COMPREHEN METABOLIC PANEL  Reviewed

 

 2013 12:00 AM  ASSAY THYROID STIM HORMONE  Reviewed

 

 10/6/2014 12:00 AM  COMPLETE CBC W/AUTO DIFF WBC  Reviewed

 

 10/6/2014 12:00 AM  COMPREHEN METABOLIC PANEL  Reviewed

 

 10/6/2014 12:00 AM  GLYCOSYLATED HEMOGLOBIN TEST  Reviewed

 

 10/6/2014 12:00 AM  ASSAY THYROID STIM HORMONE  Reviewed

 

 2014 12:00 AM  CT ABDOMEN W/O & W/DYE  Reviewed

 

 2014 12:00 AM  CT PELVIS W/O & W/DYE  Reviewed

 

 2014 12:00 AM  CT ABD & PELV 1/> REGNS  Reviewed

 

 2014 12:00 AM  ECHO EXAM OF ABDOMEN  Returned

 

 2014 12:00 AM  COMPLETE CBC AUTOMATED  Returned

 

 2014 12:00 AM  COMPREHEN METABOLIC PANEL  Returned

 

 2014 12:00 AM  ASSAY OF AMYLASE  Returned

 

 2014 12:00 AM  ASSAY OF LIPASE  Returned

 

 2014 12:00 AM  COMPLETE CBC W/AUTO DIFF WBC  Returned







Results Summary







 Data and Description  Results

 

 2012 11:18 AM  WBC 10.5 RBC 4.95 HGB 14.30 g/dLHCT 42.70 %MCV 86.0 fLMCH 
28.90 pgMCHC 33.50 g/dLRDW CV 15.90 %MPV 10.20 fLPLT 345 %NEUT 69.20 %%LYMP 
23.20 %%MONO 6.30 %%EOS 1.0 %%BASO 0.30 %#NEUT 7.25 #LYMP 2.43 #MONO 0.66 #EOS 
0.11 #BASO 0.03 GLUCOSE 97.0 mg/dLSODIUM 136.0 mmol/LPOTASSIUM 4.20 mmol/
LCHLORIDE 102.0 mmol/LCO2 22.0 mmol/LBUN 7.0 mg/dLCREATININE 1.0 mg/dLSGOT/AST 
12.0 IU/LSGPT/ALT 6.0 IU/LALK PHOS 128.0 IU/LTOTAL PROTEIN 7.40 g/dLALBUMIN 
4.20 g/dLTOTAL BILI 0.40 mg/dLCALCIUM 9.30 mg/dLeGFR 55 TRIGLYCERIDES 299.0 mg/
dLCHOLESTEROL 293.0 mg/dLHDL 33.0 mg/dLLDL (CALC) 200.0 mg/dL

 

 10/24/2013 12:32 PM  WBC 11.1 RBC 5.19 HGB 14.80 g/dLHCT 44.0 %MCV 85.0 fLMCH 
28.50 pgMCHC 33.60 g/dLRDW CV 14.90 %MPV 10.50 fLPLT 357 %NEUT 63.70 %%LYMP 
26.0 %%MONO 8.50 %%EOS 1.50 %%BASO 0.30 %#NEUT 7.07 #LYMP 2.88 #MONO 0.94 #EOS 
0.17 #BASO 0.03 TSH 1.140 uIU/mLGLUCOSE 109.0 mg/dLSODIUM 136.0 mmol/LPOTASSIUM 
4.70 mmol/LCHLORIDE 104.0 mmol/LCO2 22.0 mmol/LBUN 20.0 mg/dLCREATININE 0.90 mg/
dLSGOT/AST 14.0 IU/LSGPT/ALT 13.0 IU/LALK PHOS 90.0 IU/LTOTAL PROTEIN 7.20 g/
dLALBUMIN 4.20 g/dLTOTAL BILI 0.40 mg/dLCALCIUM 9.70 mg/dLeGFR >60 mL/min/1.73 
m2

 

 10/6/2014 4:10 PM  HGB A1C 7.20 %TSH 1.860 uIU/mLWBC 11.5 RBC 5.21 HGB 14.60 g/
dLHCT 43.0 %MCV 83.0 fLMCH 28.0 pgMCHC 34.0 g/dLRDW CV 15.20 %MPV 10.20 fLPLT 
311 %NEUT 65.30 %%LYMP 25.20 %%MONO 8.0 %%EOS 1.20 %%BASO 0.30 %#NEUT 7.51 #
LYMP 2.89 #MONO 0.92 #EOS 0.14 #BASO 0.03 EOS 1.0 %BASO 1.0 %WBC 11.5 RBC 5.21 
HGB 14.60 g/dLHCT 43.0 %MCV 83.0 fLMCH 28.0 pgMCHC 34.0 g/dLRDW CV 15.20 %MPV 
10.20 fLPLT 311 %NEUT 65.30 %%LYMP 25.20 %%MONO 8.0 %%EOS 1.20 %%BASO 0.30 %#
NEUT 7.51 #LYMP 2.89 #MONO 0.92 #EOS 0.14 #BASO 0.03 GLUCOSE 97.0 mg/dLSODIUM 
136.0 mmol/LPOTASSIUM 4.30 mmol/LCHLORIDE 103.0 mmol/LCO2 22.0 mmol/LBUN 28.0 mg
/dLCREATININE 0.80 mg/dLSGOT/AST 15.0 IU/LSGPT/ALT 12.0 IU/LALK PHOS 121.0 IU/
LTOTAL PROTEIN 7.60 g/dLALBUMIN 4.10 g/dLTOTAL BILI 0.20 mg/dLCALCIUM 9.10 mg/
dLeGFR 60 

 

 2014 2:40 PM  WBC 9.1 RBC 5.23 HGB 14.90 g/dLHCT 44.0 %MCV 84.0 fLMCH 
28.50 pgMCHC 33.90 g/dLRDW CV 16.0 %MPV 10.30 fLPLT 348 GLUCOSE 68.0 mg/
dLSODIUM 136.0 mmol/LPOTASSIUM 4.30 mmol/LCHLORIDE 102.0 mmol/LCO2 24.0 mmol/
LBUN 12.0 mg/dLCREATININE 0.90 mg/dLSGOT/AST 48.0 IU/LSGPT/.0 IU/LALK 
PHOS 293.0 IU/LTOTAL PROTEIN 7.30 g/dLALBUMIN 4.20 g/dLTOTAL BILI 0.40 mg/
dLCALCIUM 9.70 mg/dLeGFR 60 LIPASE 14.0 U/LAMYLASE 36 IU/L

 

 2014 5:45 AM  GLUCOSE 107.0 mg/dLSODIUM 136.0 mmol/LPOTASSIUM 3.90 mmol/
LCHLORIDE 102.0 mmol/LCO2 23.0 mmol/LBUN 10.0 mg/dLCREATININE 0.70 mg/dLSGOT/
AST 45.0 IU/LSGPT/.0 IU/LALK PHOS 287.0 IU/LTOTAL PROTEIN 6.80 g/
dLALBUMIN 3.60 g/dLTOTAL BILI 0.50 mg/dLCALCIUM 9.50 mg/dLeGFR 60 WBC 21.0 RBC 
5.05 HGB 13.80 g/dLHCT 42.70 %MCV 85.0 fLMCH 27.30 pgMCHC 32.30 g/dLRDW CV 
16.50 %MPV 10.40 fLPLT 301 %NEUT 78.40 %%LYMP 13.90 %%MONO 7.70 %%EOS 0.0 %%
BASO 0.0 %#NEUT 16.48 #LYMP 2.93 #MONO 1.62 #EOS 0.00 #BASO 0.01 ATYP LYMPHS 1+ 

 

 2014 6:05 AM  GLUCOSE 99.0 mg/dLSODIUM 137.0 mmol/LPOTASSIUM 3.60 mmol/
LCHLORIDE 102.0 mmol/LCO2 22.0 mmol/LBUN 9.0 mg/dLCREATININE 0.80 mg/dLSGOT/AST 
20.0 IU/LSGPT/ALT 81.0 IU/LALK PHOS 224.0 IU/LTOTAL PROTEIN 6.90 g/dLALBUMIN 
3.60 g/dLTOTAL BILI 0.60 mg/dLCALCIUM 9.50 mg/dLeGFR 60 WBC 11.2 RBC 4.99 HGB 
13.80 g/dLHCT 41.90 %MCV 84.0 fLMCH 27.70 pgMCHC 32.90 g/dLRDW CV 16.40 %MPV 
10.70 fLPLT 307 %NEUT 65.80 %%LYMP 21.60 %%MONO 8.70 %%EOS 3.70 %%BASO 0.20 %#
NEUT 7.34 #LYMP 2.41 #MONO 0.97 #EOS 0.41 #BASO 0.02 







History Of Immunizations







 Name  Date Admin  Mfg Name  Mfg Code  Trade Name  Lot#  Route  Inj  Vis Given  
Vis Pub  CVX

 

 Influenza  10/18/2014  sanofi pasteur  [employer]  Fluzone Quadrivalent  [id number]  
Intramuscular  Left Deltoid  10/18/2014  2014  141







History of Past Illness







 Name  Date of Onset  Comments

 

 Bipolar Disorder      

 

 Gastroesophageal Reflux      

 

 Chronic Obstructive Pulmonary Disease      

 

 Hypothyroidism, Acquired  10/31/2013   

 

 Abdominal pain, RUQ      

 

 Screening Mammogram  May  9 2012  9:23AM   

 

 Hyperlipidemia, unspecified  2012  8:37AM   

 

 Cough  2012  8:37AM   

 

 Pneumonia  2012  5:57PM   

 

 Upper Respiratory Infection  2012  4:57PM   

 

 Screening Mammogram For High Risk Patient  Aug 30 2012 10:13AM   

 

 Screening Mammogram  Sep 17 2013  8:10AM   

 

 Hypothyroidism, Acquired  Sep 17 2013  1:30PM   

 

 Chronic kidney disease  Sep 17 2013  1:30PM   

 

 Yeast Of Skin  Sep 17 2013  1:30PM   

 

 Hypothyroidism, Acquired  Oct 31 2013 10:07AM   

 

 Hypothyroidism, Acquired  Oct  6 2014  3:25PM   

 

 Hyperglycemia  Oct  6 2014  3:25PM   

 

 Esophageal Reflux  Oct  6 2014  3:25PM   

 

 Diabetes Mellitus, Type II  Oct 13 2014 10:43AM   

 

 Esophageal Reflux  Oct 13 2014 10:43AM   

 

 Bronchitis, Acute  2014  5:55PM   

 

 Upper Respiratory Infections  2014  5:55PM   

 

 Hernia, incisional  Dec 16 2014  5:12PM   

 

 Abdominal pain  Dec 16 2014  5:12PM   

 

 Abdominal Pain  Dec 17 2014  9:49AM   

 

 Abdominal pain, RUQ  Dec 22 2014  2:02PM   

 

 Chronic Cholecystitis  Dec 22 2014  2:02PM   

 

 Incisional Hernia: No Obstruction Or Gangrene  Dec 22 2014  2:02PM   

 

 Postoperative Follow-up: Cholecystectomy  2015  1:49PM   

 

 Postoperative Follow-Up Visit  2015  1:38PM   

 

 Postoperative Follow-Up Visit  2015  1:56PM   

 

 Bronchitis  Mar 24 2015  6:10PM   

 

 Postoperative Follow-Up Visit  2015  3:23PM   

 

 Screening Mammogram  May 11 2015 12:13PM   







Payers







 Insurance Name  Company Name  Plan Name  Plan Number  Policy Number  Policy 
Group Number  Start Date

 

    Medicare Part B  Medicare [facility]     [id number]     N/A

 

    [facility]     [id number]     N/A

 

    [place] [employer] [facility] Assistance [facility]g     
[id number]     N/A

 

    Medicare Part A  Medicare Part A     [id number]     N/A







History of Encounters







 Visit Date  Visit Type  Provider

 

 3/24/2015  Office visit  Carley BUSH

 

 2015  Office visit  Javier Preston MD

 

 2015  Office visit  Javier Preston MD

 

 2015  Office visit  Javier Preston MD

 

 2015  Office visit  Javier Preston MD

 

 2014  [facility]  Javier Preston MD

 

 2014  [facility]  Javier Preston MD

 

 2014  Office visit  Javier Preston MD

 

 2014  Office visit  Navin Raza MD

 

 2014  Office visit  Rosamaria HUNTER

 

 2014  Office visit  Sugey Hawkins APRJULIO C

 

 10/13/2014  Office visit  Navin Raza MD

 

 10/6/2014  Office visit  Navin Raza MD

 

 10/31/2013  Office visit  Navin Raza MD

 

 2013  Office visit  Navin Raza MD

 

 2012  Office visit  Navin Raza MD

## 2018-11-04 NOTE — XMS REPORT
MU2 Ambulatory Summary

 Created on: 2018



Reina Griggs

External Reference #: [id number]

: 1945

Sex: Female



Demographics







 Address  [address]

[place]  [address]

 

 Home Phone  [phone]

 

 Preferred Language  English

 

 Marital Status  

 

 Rastafari Affiliation  Unknown

 

 Race  White

 

 Ethnic Group  Not  or 





Author







 Author  Sasha Cole

 

 [facility]

 

 Address  [address]

[place], [place]  [id number]



 

 Phone  [phone]







Care Team Providers







 Care Team Member Name  Role  Phone

 

 Sasha Cole  PCP  [phone]

 

 Navin Raza  PreferredProvider  [phone]







Allergies and Adverse Reactions







 Name  Reaction  Notes

 

 PENICILLINS      







Plan of Treatment







 Planned Activity  Comments  Planned Date  Planned Time  Plan/Goal

 

 CBC With Auto Differential     2012  12:00 AM   

 

 Chest X-ray, PA and lateral     2012  12:00 AM   

 

 Diagnostic Mammogram     2012  12:00 AM   

 

 Screening mammography, bilateral     2015  12:00 AM   







Medications







 Active 

 

 Name  Start Date  Estimated Completion Date  SIG  Comments

 

 nystatin-triamcinolone 100,000-0.1 unit/g-% topical cream  2013     apply 
to the affected area(s) by topical route 2 times per day in the morning and 
evening   

 

 tramadol 50 mg oral tablet  2014     TAKE 1 TABLET BY MOUTH EVERY 6 HOURS 
AS NEEDED   

 

 tramadol 50 mg oral tablet  10/23/2014     TAKE 1 TABLET BY MOUTH EVERY 6 
HOURS AS NEEDED   

 

 tramadol 50 mg oral tablet  10/23/2014     TAKE 1 TABLET BY MOUTH EVERY 6 
HOURS AS NEEDED   

 

 metoclopramide HCl 10 mg oral tablet  2014     TAKE 1 TABLET BY MOUTH 
FOUR TIMES DAILY 30 MINUTES BEFORE MEALS AND AT BEDTIME   

 

 Abdominal Belt 1 abdominal binder  2015     Use PRN for incisional support
   

 

 tramadol 50 mg oral tablet  2015     TAKE 1 TABLET BY MOUTH EVERY 6 HOURS 
AS NEEDED   

 

 tramadol 50 mg oral tablet  2015     TAKE 1 TABLET BY MOUTH EVERY 6 HOURS 
AS NEEDED   

 

 metoclopramide HCl 10 mg oral tablet  2015     TAKE 1 TABLET BY MOUTH 
FOUR TIMES DAILY 30 MINUTES BEFORE MEALS AND AT BEDTIME   

 

 levothyroxine 50 mcg oral tablet  2015     TAKE 1 TABLET BY MOUTH ONCE 
DAILY   

 

 metoclopramide HCl 10 mg oral tablet  2016     TAKE 1 TABLET BY MOUTH 
FOUR TIMES DAILY 30 MINUTES BEFORE MEALS AND AT BEDTIME   

 

 ProAir HFA 90 mcg/actuation inhalation HFA aerosol inhaler  2016     
inhale 1 - 2 puffs (90 - 180 mcg) by inhalation route every 6 hours as needed   

 

 ProAir HFA 90 mcg/actuation inhalation HFA aerosol inhaler  10/31/2016     
INHALE 1 TO 2 PUFFS BY MOUTH EVERY 6 HOURS AS NEEDED   

 

 Nebulizer and accessories  10/31/2016     Use as directed DX: COPD RAFAEL: 99   

 

 albuterol sulfate 2.5 mg /3 mL (0.083 %) inhalation solution for nebulization  
10/31/2016     inhale 3 milliliters (2.5 mg) by nebulization route every 8 
hours as needed   

 

 ProAir HFA 90 mcg/actuation inhalation HFA aerosol inhaler  10/31/2016     
INHALE 1 - 2 PUFFS (90 - 180 MCG) BY INHALATION ROUTE EVERY 6 HOURS AS NEEDED   

 

 Zetia 10 mg oral tablet  2018  1/3/2019  take 1 tablet (10 mg) by oral 
route once daily for 30 days   

 

 tramadol 50 mg oral tablet  2018     take 1 tablet by oral route every 4 
hours as needed   

 

 levothyroxine 50 mcg oral tablet  2018  TAKE 1 TABLET BY MOUTH 
EVERY DAY. NEED APPOINTMENT FOR FURTHER REFILLS   

 

 nystatin-triamcinolone 100,000-0.1 unit/gram-% topical ointment  2018     
apply to the affected area(s) by topical route 2 times per day   

 

 Vesicare 5 mg oral tablet  2018     TAKE 1 TABLET BY MOUTH DAILY   

 

 imipramine HCl 50 mg oral tablet  2018     TAKE 2 TABLETS BY MOUTH EVERY 
MORNING AND 3 EVERY NIGHT AT BEDTIME   

 

 metoclopramide HCl 10 mg oral tablet  2018     TAKE 1 TABLET BY MOUTH FOUR 
TIMES DAILY 30 MINUTES BEFORE MEALS AND AT BEDTIME FOR 30 DAYS   

 

 prednisone 20 mg oral tablet  2018  take 2 tablet (20 mg) by 
oral route once daily   

 

 azithromycin 250 mg oral tablet  2018  take 2 tablets (500 mg) 
by oral route once daily for 1 day then 1 tablet (250 mg) by oral route once 
daily for 4 days   









  

 

 Name  Start Date  Expiration Date  SIG  Comments

 

 tramadol 50 mg oral tablet  2013  1/15/2014  take 1 tablet (50 mg) by 
oral route every 6 hours as needed for 30 days   

 

 Reglan 10 mg oral tablet  2014  take 1 tablet (10 mg) by oral 
route 4 times per day 30 minutes before meals and at bedtime   

 

 omeprazole 40 mg oral capsule,delayed release(DR/EC)  10/13/2014  10/8/2015  
take 1 capsule (40 mg) by oral route once daily before a meal for 30 days   

 

 Synthroid 50 mcg oral tablet  10/23/2014  10/18/2015  take 1 tablet (50 mcg) 
by oral route once daily for 90 days   

 

 Zithromax Z-Paul 250 mg oral tablet  2014  Take 2 tablets the 
first day (500 mg) followed by 1 tablet (250 mg) days 2-5. for 5 days   

 

 Medrol (Paul) 4 mg oral tablets,dose pack  2014     take as directed   

 

 azithromycin 250 mg oral tablet  3/24/2015  3/29/2015  take 2 tablets (500 mg) 
by oral route once daily for 1 day then 1 tablet (250 mg) by oral route once 
daily for 4 days   

 

 Bactrim -160 mg oral tablet  2016  10/6/2016  take 1 tablet by oral 
route 2 times per day for 7 days   









 Discontinued 

 

 Name  Start Date  Discontinued Date  SIG  Comments

 

 nystatin-triamcinolone 100,000-0.1 unit/g-% topical cream  2014
  APPLY TO THE AFFECTED AREA TWICE DAILY , IN MORNING AND EVENING   

 

 Synthroid 50 mcg oral tablet  10/23/2014  2016  TAKE 1 TABLET (50 MCG) BY 
ORAL ROUTE ONCE DAILY FOR 90 DAYS   

 

 Reglan 10 mg oral tablet  2014  TAKE 1 TABLET (10 MG) BY 
ORAL ROUTE 4 TIMES PER DAY 30 MINUTES BEFORE MEALS AND AT BEDTIME   

 

 oxycodone 15 mg oral tablet  2014  take 1 tablet (15 mg) by 
oral route every 6 hours   

 

 Medrol (Paul) 4 mg oral tablets,dose pack  3/24/2015  2016  take as 
directed   







Problem List







 Description  Status  Onset

 

 Bipolar Disorder  Active   

 

 Chronic Obstructive Pulmonary Disease  Active   

 

 Gastroesophageal Reflux  Active   

 

 Hypothyroidism, acquired  Active  10/31/2013

 

 Abdominal Pain, RUQ  Active   







Vital Signs







 Date  Time  BP-Sys(mm[Hg]  BP-Sabrina(mm[Hg])  HR(bpm)  RR(rpm)  Temp  WT  HT  HC  
BMI  BSA  BMI Percentile  O2 Sat(%)

 

 2018  5:27:00 PM  146 mmHg  82 mmHg  87 bpm  20 rpm  98.6 F  168 lbs  
66.5 in     26.7094 kg/m  1.8909 m     97 %

 

 2018  9:00:00 AM  148 mmHg  80 mmHg  78 bpm  16 rpm  96.2 F  168 lbs  66.5 
in     26.71 kg/m2  1.89 m2     99 %

 

 10/26/2016  5:04:00 PM  132 mmHg  74 mmHg  84 bpm  18 rpm  97.8 F  176.125 lbs
                 95 %

 

 2016  7:10:00 PM  124 mmHg  68 mmHg  94 bpm  20 rpm  99 F  169 lbs  66.5 
in     26.8684 kg/m  1.8965 m     95 %

 

 2016  10:18:00 AM  148 mmHg  80 mmHg  82 bpm  18 rpm  97.8 F  169 lbs  
66.5 in     26.87 kg/m2  1.90 m2     97 %

 

 3/24/2015  6:08:00 PM  120 mmHg  78 mmHg  89 bpm  20 rpm  98.3 F  160 lbs     
            96 %

 

 2015  1:55:00 PM  154 mmHg  80 mmHg  88 bpm  20 rpm  97.3 F     66.5 in  
             

 

 2015  1:36:00 PM  166 mmHg  82 mmHg  94 bpm  24 rpm  96.2 F  162 lbs  66.5 
in     25.7555 kg/m  1.8568 m     98 %

 

 2015  3:17:00 PM  140 mmHg  76 mmHg  95 bpm  20 rpm  96 F                 
   99 %

 

 2015  1:48:00 PM  160 mmHg  78 mmHg  87 bpm  24 rpm  96.5 F               
     94 %

 

 2014  1:41:00 PM  156 mmHg  84 mmHg  94 bpm  20 rpm  96.3 F  166 lbs  
66.5 in     26.3914 kg/m  1.8796 m     97 %

 

 2014  9:43:00 AM  142 mmHg  64 mmHg  100 bpm  18 rpm  97.2 F  166 lbs  
66.5 in     26.39 kg/m2  1.88 m2      

 

 2014  5:05:00 PM  150 mmHg  62 mmHg  99 bpm  20 rpm  97.6 F  170 lbs  
66.5 in     27.0274 kg/m  1.9021 m     97 %

 

 2014  5:54:00 PM  145 mmHg  90 mmHg  93 bpm  18 rpm  97.5 F  170 lbs  
66.5 in     27.0274 kg/m  1.90 m2     96 %

 

 10/6/2014  3:21:00 PM  140 mmHg  82 mmHg  82 bpm  18 rpm  97.6 F  170 lbs  
66.5 in     27.03 kg/m2  1.9021 m      

 

 10/31/2013  10:02:00 AM  118 mmHg  64 mmHg  78 bpm  18 rpm  98 F  161 lbs  
66.5 in     25.5965 kg/m  1.85 m2      

 

 2013  1:28:00 PM  150 mmHg  80 mmHg  84 bpm  16 rpm  98.1 F  161 lbs  
66.5 in     25.60 kg/m2  1.8511 m     97 %

 

 2012  8:27:00 AM  136 mmHg  68 mmHg  72 bpm  18 rpm  97.6 F  157 lbs  
66.5 in     24.9606 kg/m  1.83 m2      







Social History







 Name  Description  Comments

 

 Tobacco  Current every day smoker  10/26/2016 -  







History of Procedures







 Date Ordered  Description  Order Status

 

 2016 12:00 AM  COMPLETE CBC W/AUTO DIFF WBC  Reviewed

 

 2016 12:00 AM  COMPREHEN METABOLIC PANEL  Reviewed

 

 2016 12:00 AM  LIPID PANEL  Reviewed

 

 2016 12:00 AM  GLYCOSYLATED HEMOGLOBIN TEST  Reviewed

 

 2016 12:00 AM  ASSAY THYROID STIM HORMONE  Reviewed

 

 2016 12:00 AM  AIRWAY INHALATION TREATMENT  Reviewed

 

 10/26/2016 12:00 AM  COMPLETE CBC W/AUTO DIFF WBC  Reviewed

 

 10/26/2016 12:00 AM  COMPREHEN METABOLIC PANEL  Reviewed

 

 10/26/2016 12:00 AM  CHEST X-RAY 2VW FRONTAL&LATL  Reviewed

 

 2012 12:00 AM  COMPUTER DX MAMMOGRAM ADD-ON  Reviewed

 

 2012 12:00 AM  COMPREHEN METABOLIC PANEL  Reviewed

 

 2012 12:00 AM  LIPID PANEL  Reviewed

 

 2012 12:00 AM  CHEST X-RAY 2VW FRONTAL&LATL  Reviewed

 

 2012 12:00 AM  CHEST X-RAY 2VW FRONTAL&LATL  Reviewed

 

 2018 12:00 AM  MAMMOGRAM BOTH BREASTS  Returned

 

 2013 12:00 AM  MAMMOGRAM SCREENING  Reviewed

 

 2013 12:00 AM  COMPLETE CBC W/AUTO DIFF WBC  Reviewed

 

 2013 12:00 AM  COMPREHEN METABOLIC PANEL  Reviewed

 

 2013 12:00 AM  ASSAY THYROID STIM HORMONE  Reviewed

 

 10/6/2014 12:00 AM  COMPLETE CBC W/AUTO DIFF WBC  Reviewed

 

 10/6/2014 12:00 AM  COMPREHEN METABOLIC PANEL  Reviewed

 

 10/6/2014 12:00 AM  GLYCOSYLATED HEMOGLOBIN TEST  Reviewed

 

 10/6/2014 12:00 AM  ASSAY THYROID STIM HORMONE  Reviewed

 

 10/6/2014 12:00 AM  INFLUENZA VIRUS VACCINE SPLIT VIRUS 3/> YRS IM  Reviewed

 

 2014 12:00 AM  CT ABDOMEN W/O & W/DYE  Reviewed

 

 2014 12:00 AM  CT PELVIS W/O & W/DYE  Reviewed

 

 2014 12:00 AM  CT ABD & PELV 1/> REGNS  Reviewed

 

 2014 12:00 AM  ECHO EXAM OF ABDOMEN  Reviewed

 

 2014 12:00 AM  COMPLETE CBC AUTOMATED  Reviewed

 

 2014 12:00 AM  COMPREHEN METABOLIC PANEL  Reviewed

 

 2014 12:00 AM  ASSAY OF AMYLASE  Reviewed

 

 2014 12:00 AM  ASSAY OF LIPASE  Reviewed

 

 2014 12:00 AM  COMPLETE CBC W/AUTO DIFF WBC  Reviewed







Results Summary







 Date and Description  Results

 

 2012 11:18 AM  GLUCOSE 97.0 mg/dLSODIUM 136.0 mmol/LPOTASSIUM 4.20 mmol/
LCHLORIDE 102.0 mmol/LCO2 22.0 mmol/LBUN 7.0 mg/dLCREATININE 1.0 mg/dLSGOT/AST 
12.0 IU/LSGPT/ALT 6.0 IU/LALK PHOS 128.0 IU/LTOTAL PROTEIN 7.40 g/dLALBUMIN 
4.20 g/dLTOTAL BILI 0.40 mg/dLCALCIUM 9.30 mg/dLAGE 66 GFR NonAA 55 GFR AA 67 
eGFR 55 eGFR AA* 60 TRIGLYCERIDES 299.0 mg/dLCHOLESTEROL 293.0 mg/dLHDL 33.0 mg/
dLTOT CHOL/HDL 8.9 LDL (CALC) 200.0 mg/dL

 

 10/24/2013 12:32 PM  WBC 11.1 RBC 5.19 HGB 14.80 g/dLHCT 44.0 %MCV 85.0 fLMCH 
28.50 pgMCHC 33.60 g/dLRDW SD 46 RDW CV 14.90 %MPV 10.50 fLPLT 357 NRBC# 0.00 
NRBC% 0.0 %NEUT 63.70 %%LYMP 26.0 %%MONO 8.50 %%EOS 1.50 %%BASO 0.30 %#NEUT 
7.07 #LYMP 2.88 #MONO 0.94 #EOS 0.17 #BASO 0.03 MANUAL DIFF NOT IND TSH 1.140 
uIU/mLGLUCOSE 109.0 mg/dLSODIUM 136.0 mmol/LPOTASSIUM 4.70 mmol/LCHLORIDE 104.0 
mmol/LCO2 22.0 mmol/LBUN 20.0 mg/dLCREATININE 0.90 mg/dLSGOT/AST 14.0 IU/LSGPT/
ALT 13.0 IU/LALK PHOS 90.0 IU/LTOTAL PROTEIN 7.20 g/dLALBUMIN 4.20 g/dLTOTAL 
BILI 0.40 mg/dLCALCIUM 9.70 mg/dLAGE 68 GFR NonAA 62 GFR AA 75 eGFR >60 mL/min/
1.73 m2eGFR AA* >60 

 

 10/6/2014 4:10 PM  HGB A1C 7.20 %Est Avg Glucose 159.9 mg/dLTSH 1.860 uIU/
mLWBC 11.5 RBC 5.21 HGB 14.60 g/dLHCT 43.0 %MCV 83.0 fLMCH 28.0 pgMCHC 34.0 g/
dLRDW SD 46 RDW CV 15.20 %MPV 10.20 fLPLT 311 NRBC# 0.00 NRBC% 0.0 %NEUT 65.30 %
%LYMP 25.20 %%MONO 8.0 %%EOS 1.20 %%BASO 0.30 %#NEUT 7.51 #LYMP 2.89 #MONO 0.92 
#EOS 0.14 #BASO 0.03 MANUAL DIFF SEE BELOW SEGS 69 LYMPHS 20 MONOS 9 EOS 1.0 %
BASO 1.0 %WBC 11.5 RBC 5.21 HGB 14.60 g/dLHCT 43.0 %MCV 83.0 fLMCH 28.0 pgMCHC 
34.0 g/dLRDW SD 46 RDW CV 15.20 %MPV 10.20 fLPLT 311 NRBC# 0.00 NRBC% 0.0 %NEUT 
65.30 %%LYMP 25.20 %%MONO 8.0 %%EOS 1.20 %%BASO 0.30 %#NEUT 7.51 #LYMP 2.89 #
MONO 0.92 #EOS 0.14 #BASO 0.03 MANUAL DIFF PENDING GLUCOSE 97.0 mg/dLSODIUM 
136.0 mmol/LPOTASSIUM 4.30 mmol/LCHLORIDE 103.0 mmol/LCO2 22.0 mmol/LBUN 28.0 mg
/dLCREATININE 0.80 mg/dLSGOT/AST 15.0 IU/LSGPT/ALT 12.0 IU/LALK PHOS 121.0 IU/
LTOTAL PROTEIN 7.60 g/dLALBUMIN 4.10 g/dLTOTAL BILI 0.20 mg/dLCALCIUM 9.10 mg/
dLAGE 69 GFR NonAA 71 GFR AA 86 eGFR 60 eGFR AA* 60 

 

 2014 2:40 PM  WBC 9.1 RBC 5.23 HGB 14.90 g/dLHCT 44.0 %MCV 84.0 fLMCH 
28.50 pgMCHC 33.90 g/dLRDW SD 48 RDW CV 16.0 %MPV 10.30 fLPLT 348 NRBC# 0.00 
NRBC% 0.0 GLUCOSE 68.0 mg/dLSODIUM 136.0 mmol/LPOTASSIUM 4.30 mmol/LCHLORIDE 
102.0 mmol/LCO2 24.0 mmol/LBUN 12.0 mg/dLCREATININE 0.90 mg/dLSGOT/AST 48.0 IU/
LSGPT/.0 IU/LALK PHOS 293.0 IU/LTOTAL PROTEIN 7.30 g/dLALBUMIN 4.20 g/
dLTOTAL BILI 0.40 mg/dLCALCIUM 9.70 mg/dLAGE 69 GFR NonAA 62 GFR AA 75 eGFR 60 
eGFR AA* 60 LIPASE 14.0 U/LAMYLASE 36 IU/L

 

 2014 5:45 AM  GLUCOSE 107.0 mg/dLSODIUM 136.0 mmol/LPOTASSIUM 3.90 mmol/
LCHLORIDE 102.0 mmol/LCO2 23.0 mmol/LBUN 10.0 mg/dLCREATININE 0.70 mg/dLSGOT/
AST 45.0 IU/LSGPT/.0 IU/LALK PHOS 287.0 IU/LTOTAL PROTEIN 6.80 g/
dLALBUMIN 3.60 g/dLTOTAL BILI 0.50 mg/dLCALCIUM 9.50 mg/dLAGE 69 GFR NonAA 83 
GFR  eGFR 60 eGFR AA* 60 WBC 21.0 RBC 5.05 HGB 13.80 g/dLHCT 42.70 %MCV 
85.0 fLMCH 27.30 pgMCHC 32.30 g/dLRDW SD 50 RDW CV 16.50 %MPV 10.40 fLPLT 301 
NRBC# 0.00 NRBC% 0.0 %NEUT 78.40 %%LYMP 13.90 %%MONO 7.70 %%EOS 0.0 %%BASO 0.0 %
#NEUT 16.48 #LYMP 2.93 #MONO 1.62 #EOS 0.00 #BASO 0.01 MANUAL DIFF SEE BELOW 
SEGS 75 BANDS 1 LYMPHS 17 MONOS 7 ATYP LYMPHS 1+ 

 

 2016 11:05 AM  WBC 10.0 RBC 5.49 HGB 15.50 g/dLHCT 47.60 %MCV 87.0 fLMCH 
28.20 pgMCHC 32.60 g/dLRDW SD 48 RDW CV 15.30 %MPV 9.80 fLPLT 290 NRBC# 0.00 
NRBC% 0.0 %NEUT 58.30 %%LYMP 32.50 %%MONO 7.40 %%EOS 0.80 %%BASO 0.50 %#NEUT 
5.83 #LYMP 3.25 #MONO 0.74 #EOS 0.08 #BASO 0.05 MANUAL DIFF NOT IND HGB A1C 
5.60 %Est Avg Glucose 114.0 mg/dLTSH 2.360 uIU/mLGLUCOSE 96.0 mg/dLSODIUM 137.0 
mmol/LPOTASSIUM 4.70 mmol/LCHLORIDE 102.0 mmol/LCO2 25.0 mmol/LBUN 17.0 mg/
dLCREATININE 1.0 mg/dLSGOT/AST 16.0 IU/LSGPT/ALT 13.0 IU/LALK PHOS 109.0 IU/
LTOTAL PROTEIN 7.30 g/dLALBUMIN 4.60 g/dLTOTAL BILI 0.40 mg/dLCALCIUM 9.70 mg/
dLAGE 71 GFR NonAA 55 GFR AA 67 eGFR 55 eGFR AA* >60 CHOLESTEROL 292.0 mg/dL

 

 10/26/2016 5:39 PM  WBC 11.6 RBC 5.52 HGB 15.50 g/dLHCT 48.40 %MCV 88.0 fLMCH 
28.10 pgMCHC 32.0 g/dLRDW SD 47 RDW CV 14.60 %MPV 9.70 fLPLT 298 NRBC# 0.00 NRBC
% 0.0 %NEUT 53.80 %%LYMP 32.20 %%MONO 8.80 %%EOS 4.10 %%BASO 0.60 %#NEUT 6.22 #
LYMP 3.72 #MONO 1.02 #EOS 0.48 #BASO 0.07 MANUAL DIFF NOT IND GLUCOSE 92.0 mg/
dLSODIUM 138.0 mmol/LPOTASSIUM 4.50 mmol/LCHLORIDE 99.0 mmol/LCO2 26.0 mmol/
LBUN 13.0 mg/dLCREATININE 1.0 mg/dLSGOT/AST 15.0 IU/LSGPT/ALT 15.0 IU/LALK PHOS 
125.0 IU/LTOTAL PROTEIN 7.50 g/dLALBUMIN 4.30 g/dLTOTAL BILI 0.30 mg/dLCALCIUM 
9.60 mg/dLAGE 71 GFR NonAA 55 GFR AA 67 eGFR 55 eGFR AA* >60 







History Of Immunizations







 Name  Date Admin  Mfg Name  Mfg Code  Trade Name  Lot#  Route  Inj  Vis Given  
Vis Pub  CVX

 

 Influenza  10/18/2014  sanofi pasteur  [employer]  Fluzone Quadrivalent  [id number]  
Intramuscular  Left Deltoid  10/18/2014  2014  141







History of Past Illness







 Name  Date of Onset  Comments

 

 Bipolar Disorder      

 

 Gastroesophageal Reflux      

 

 Chronic Obstructive Pulmonary Disease      

 

 Hypothyroidism, acquired  10/31/2013   

 

 Abdominal Pain, RUQ      

 

 Screening Mammogram  May  9 2012  9:23AM   

 

 Hyperlipidemia, unspecified  2012  8:37AM   

 

 Cough  2012  8:37AM   

 

 Pneumonia  2012  5:57PM   

 

 Upper Respiratory Infection  2012  4:57PM   

 

 Screening Mammogram For High Risk Patient  Aug 30 2012 10:13AM   

 

 Screening Mammogram  Sep 17 2013  8:10AM   

 

 Hypothyroidism, Acquired  Sep 17 2013  1:30PM   

 

 Chronic kidney disease  Sep 17 2013  1:30PM   

 

 Yeast Of Skin  Sep 17 2013  1:30PM   

 

 Hypothyroidism, Acquired  Oct 31 2013 10:07AM   

 

 Hypothyroidism, Acquired  Oct  6 2014  3:25PM   

 

 Hyperglycemia  Oct  6 2014  3:25PM   

 

 Esophageal Reflux  Oct  6 2014  3:25PM   

 

 Diabetes Mellitus, Type II  Oct 13 2014 10:43AM   

 

 Esophageal Reflux  Oct 13 2014 10:43AM   

 

 Bronchitis, Acute  2014  5:55PM   

 

 Upper Respiratory Infections  2014  5:55PM   

 

 Hernia, incisional  Dec 16 2014  5:12PM   

 

 Abdominal pain  Dec 16 2014  5:12PM   

 

 Abdominal Pain  Dec 17 2014  9:49AM   

 

 Abdominal pain, RUQ  Dec 22 2014  2:02PM   

 

 Chronic Cholecystitis  Dec 22 2014  2:02PM   

 

 Incisional Hernia: No Obstruction Or Gangrene  Dec 22 2014  2:02PM   

 

 Postoperative Follow-up: Cholecystectomy  2015  1:49PM   

 

 Postoperative Follow-Up Visit  2015  1:38PM   

 

 Postoperative Follow-Up Visit  2015  1:56PM   

 

 Bronchitis  Mar 24 2015  6:10PM   

 

 Postoperative Follow-Up Visit  2015  3:23PM   

 

 Screening Mammogram  May 11 2015 12:13PM   

 

 Hypothyroidism, Acquired  Sep 14 2016 10:25AM   

 

 Low Back Pain  Sep 14 2016 10:25AM   

 

 Hyperglycemia  Sep 14 2016 10:25AM   

 

 Bronchitis  Sep 29 2016  7:12PM   

 

 Wheezing  Sep 29 2016  7:12PM   

 

 Coughing  Sep 29 2016  7:12PM   

 

 Cough  Oct 26 2016  5:06PM   

 

 Shortness of breath  Oct 26 2016  5:06PM   

 

 Chronic obstructive pulmonary disease, unspecified COPD type  Oct 26 2016  5:
06PM   

 

 Encounter for screening mammogram for breast cancer  2018 10:38AM   

 

 Hypertension  2018  9:09AM   

 

 Hyperlipidemia, unspecified  2018  9:09AM   

 

 Fibromyalgia  2018  9:09AM   

 

 COPD exacerbation  Sep 25 2018  5:32PM   

 

 Back strain  Sep 25 2018  5:32PM   







Payers







 Insurance Name  Company Name  Plan Name  Plan Number  Policy Number  Policy 
Group Number  Start Date

 

    Medicare RHC Medicare RHC     [id number]     N/A

 

    Medicare Part B  [employer] [place]     [id number]     N/A

 

    [facility]     [id number]     N/A

 

    [place] Medical [employer] [facility] Medical Assistance Prog     
[id number]     N/A

 

    Medicare Part A  Medicare Part A     [id number]     N/A

 

    Medicare Part A  Medicare - Lab/Xray     [id number]     N/A







History of Encounters







 Visit Date  Visit Type  Provider

 

 2018  Office visit  Sasha VEGA

 

 2018  Office visit  Navin Raza MD

 

 10/26/2016  Office visit  Saqib Bustillos APRN

 

 2016  Office visit  Saqib Bustillos APRN

 

 2016  Office visit   

 

 2016  Office visit  Navin Raza MD

 

 3/24/2015  Office visit  Carley BUSH

 

 2015  Office visit  Javier Preston MD

 

 2015  Office visit  Javier Preston MD

 

 2015  Office visit  Javier Preston MD

 

 2015  Office visit  Javier Preston MD

 

 2014  Hospital  Javier Preston MD

 

 2014  [facility]  Javier Preston MD

 

 2014  Office visit   

 

 2014  Office visit  Javier Preston MD

 

 2014  Office visit  Navin Raza MD

 

 2014  Office visit  Rosamaria Maria APRN

 

 2014  Office visit  Sugey Hawkins APRN

 

 10/13/2014  Office visit  Navin Raza MD

 

 10/6/2014  Office visit   

 

 10/6/2014  Office visit  Navin Raza MD

 

 10/31/2013  Office visit  Navin Raza MD

 

 2013  Office visit  Navin Raza MD

 

 2012  Office visit  Navin Raza MD

## 2018-11-04 NOTE — XMS REPORT
Continuity of Care Document

 Created on: 2018



JESIKA TREJO

External Reference #: [id number]

: 1945

Sex: Female



Demographics







 Address  [address]

[place]  [id number]

 

 Home Phone  [phone] x

 

 Preferred Language  Unknown

 

 Marital Status  Unknown

 

 Church Affiliation  Unknown

 

 Race  Unknown

 

 Ethnic Group  Unknown





Author







 Author  [facility]

 

 Organization  [facility]

 

 Address  Unknown

 

 Phone  Unavailable



              



Allergies

      





 Active            Description            Code            Type            
Severity            Reaction            Onset            Reported/Identified   
         Relationship to Patient            Clinical Status        

 

 Yes            PCN (penicillin)            [id number]            CLASS          
  N/A            N/A                                                            

 

 Yes            No Known Drug Allergies            [id number]            Drug 
Allergy            Unknown            N/A                         10/25/2018   
                               



                    



Medications

      



There is no data.                  



Problems

      





 Date Dx Coded            Attending            Type            Code            
Diagnosis            Diagnosed By        

 

 2015            SHIRA BRAR MD            Ot            V76.12    
                              

 

 10/17/2018            SHIRA BRAR MD            Ot            V76.12    
        OTH SCREEN MAMMO-MALIGN NEOPLASM OF LARRY                     

 

 10/24/2018            SHIRA BRAR MD            Ot            V76.12    
        OTH SCREEN MAMMO-MALIGN NEOPLASM OF LARRY                     

 

 10/29/2018            SHIRA BRAR MD            Ot            V76.12    
        OTH SCREEN MAMMO-MALIGN NEOPLASM OF LARRY                     



                        



Procedures

      



There is no data.                  



Results

      



There is no data.              



Encounters

      





 ACCT No.            Visit Date/Time            Discharge            Status    
        Pt. Type            Provider            Facility            Loc./Unit  
          Complaint        

 

 [id number]            10/31/2018 12:52:00            10/31/2018 23:59:59    
        CLS            Outpatient            FILEMON SHERWOOD MD            [facility]            ONC                     

 

 [id number]            2015 15:11:00            2015 23:59:59    
        CLS            Outpatient            SHIRA BRAR MD            
[facility]            RAD            SCREENING        

 

 [id number]            2018 17:24:00                         [facility]       
     Emergency            MICHAEL ZALDIVAR MD            [facility]            ER            POST CHEMO PROBLEMS        

 

 [id number]            10/15/2018 09:50:10                                      
Document Registration                                                          
  

 

 [id number]            10/07/2018 22:31:06                                      
Document Registration                                                          
  

 

 [id number]            10/01/2018 19:18:54                                      
Document Registration                                                          
  

 

 [id number]            10/01/2018 19:13:05                                      
Document Registration                                                          
  

 

 [id number]            2018 10:37:11                                      
Document Registration                                                          
  

 

 [id number]            10/10/2018 09:49:55            10/10/2018 23:59:59          
  CLS            Outpatient            Shira Brar                         
                      

 

 [id number]            2018 18:06:28            2018 23:59:59          
  CLS            Outpatient            Val Cole                        
                       

 

 [id number]            2018 09:35:39            2018 23:59:59          
  CLS            Outpatient            TalyalingShira ramsay                         
                      

 

 [id number]            10/26/2016 18:01:57            10/26/2016 23:59:59          
  CLS            Outpatient            TressaSaqib                       
                        

 

 [id number]            2016 20:09:20            2016 23:59:59          
  CLS            Outpatient            Saqib Bustillos                       
                        

 

 [id number]            2016 10:48:55            2016 23:59:59          
  CLS            Outpatient            [facility] Shira                         
                      

 

 [id number]            2015 18:29:45            2015 23:59:59          
  CLS            Outpatient            Erlin Correaul                             
                  

 

 [id number]            2015 16:11:21            2015 23:59:59          
  CLS            Outpatient            Javier Preston                           
                    

 

 [id number]            2015 07:25:45            2015 23:59:59          
  CLS            Outpatient            Javier Preston                           
                    

 

 [id number]            2015 20:34:53            2015 23:59:59          
  CLS            Outpatient            Javier Preston                           
                    

 

 [id number]            2015 14:28:09            2015 23:59:59          
  CLS            Outpatient            Javier Preston                           
                    

 

 [id number]            2014 14:31:47            2014 23:59:59          
  CLS            Outpatient            Javier Preston                           
                    

 

 [id number]            2014 10:13:56            2014 23:59:59          
  CLS            Outpatient            Luz MarinaShira                         
                      

 

 [id number]            2014 18:03:10            2014 23:59:59          
  CLS            Outpatient            Rosamaria Maria                              
                 

 

 [id number]            2014 18:11:59            2014 23:59:59          
  CLS            Outpatient            Sugey Hawkins                     
                          

 

 [id number]            10/13/2014 11:38:15            10/13/2014 23:59:59          
  CLS            Outpatient            Shira Brar                         
                      

 

 [id number]            10/06/2014 16:19:02            10/06/2014 23:59:59          
  CLS            Outpatient            Shira Brar

## 2018-11-04 NOTE — XMS REPORT
MU2 Ambulatory Summary

 Created on: 10/13/2016



Reina Griggs

External Reference #: [id number]

: 1945

Sex: Female



Demographics







 Address  [address]

[place]  [address]

 

 Home Phone  [phone]

 

 Preferred Language  English

 

 Marital Status  

 

 Denominational Affiliation  Unknown

 

 Race  White

 

 Ethnic Group  Not  or 





Author







 Author  Saqib Bustillos

 

 [facility]

 

 Address  [address]

[place], [place]  [id number]



 

 Phone  [phone]







Care Team Providers







 Care Team Member Name  Role  Phone

 

 Saqib Bustillos  PCP  [phone]







Allergies and Adverse Reactions







 Name  Reaction  Notes

 

 PENICILLINS      







Plan of Treatment







 Planned Activity  Comments  Planned Date  Planned Time  Plan/Goal

 

 AIRWAY INHALATION TREATMENT     2016  12:00 AM   

 

 COMPLETE CBC W/AUTO DIFF WBC     2012  12:00 AM   

 

 CHEST X-RAY 2VW FRONTAL&LATL     2012  12:00 AM   

 

 COMPUTER DX MAMMOGRAM ADD-ON     2012  12:00 AM   

 

 MAMMOGRAM SCREENING     2015  12:00 AM   







Medications







 Active 

 

 Name  Start Date  Estimated Completion Date  SIG  Comments

 

 nystatin-triamcinolone 100,000-0.1 unit/g-% topical cream  2013     apply 
to the affected area(s) by topical route 2 times per day in the morning and 
evening   

 

 imipramine HCl 50 mg oral tablet  2014     TAKE 2 TABLETS BY MOUTH EVERY 
MORNING AND 3 TABLETS BY MOUTH EVERY NIGHT AT BEDTIME   

 

 Vesicare 5 mg oral tablet  2014     TAKE 1 TABLET BY MOUTH DAILY   

 

 tramadol 50 mg oral tablet  2014     TAKE 1 TABLET BY MOUTH EVERY 6 HOURS 
AS NEEDED   

 

 imipramine HCl 50 mg oral tablet  2014     TAKE 2 TABLETS BY MOUTH EVERY 
MORNING AND 3 TABLETS BY MOUTH EVERY NIGHT AT BEDTIME   

 

 imipramine HCl 50 mg oral tablet  10/20/2014     TAKE 2 TABLETS BY MOUTH EVERY 
MORNING AND 3 TABLETS BY MOUTH EVERY NIGHT AT BEDTIME   

 

 tramadol 50 mg oral tablet  10/23/2014     TAKE 1 TABLET BY MOUTH EVERY 6 
HOURS AS NEEDED   

 

 Vesicare 5 mg oral tablet  10/23/2014     TAKE 1 TABLET BY MOUTH DAILY   

 

 tramadol 50 mg oral tablet  10/23/2014     TAKE 1 TABLET BY MOUTH EVERY 6 
HOURS AS NEEDED   

 

 Vesicare 5 mg oral tablet  10/23/2014     TAKE 1 TABLET BY MOUTH DAILY   

 

 metoclopramide HCl 10 mg oral tablet  2014     TAKE 1 TABLET BY MOUTH 
FOUR TIMES DAILY 30 MINUTES BEFORE MEALS AND AT BEDTIME   

 

 Abdominal Belt 1 abdominal binder  2015     Use PRN for incisional support
   

 

 imipramine HCl 50 mg oral tablet  2015     TAKE 2 TABLETS BY MOUTH EVERY 
MORNING AND 3 TABLETS BY MOUTH EVERY NIGHT AT BEDTIME   

 

 Vesicare 5 mg oral tablet  3/10/2015     TAKE 1 TABLET BY MOUTH DAILY   

 

 tramadol 50 mg oral tablet  2015     TAKE 1 TABLET BY MOUTH EVERY 6 HOURS 
AS NEEDED   

 

 tramadol 50 mg oral tablet  2015     TAKE 1 TABLET BY MOUTH EVERY 6 HOURS 
AS NEEDED   

 

 imipramine HCl 50 mg oral tablet  2015     TAKE 2 TABLETS BY MOUTH EVERY 
MORNING AND 3 TABLETS BY MOUTH EVERY NIGHT AT BEDTIME   

 

 metoclopramide HCl 10 mg oral tablet  2015     TAKE 1 TABLET BY MOUTH 
FOUR TIMES DAILY 30 MINUTES BEFORE MEALS AND AT BEDTIME   

 

 levothyroxine 50 mcg oral tablet  2015     TAKE 1 TABLET BY MOUTH ONCE 
DAILY   

 

 imipramine HCl 50 mg oral tablet  2015     TAKE 2 TABLETS BY MOUTH EVERY 
MORNING AND 3 TABLETS BY MOUTH EVERY NIGHT AT BEDTIME   

 

 Vesicare 5 mg oral tablet  2016     TAKE 1 TABLET BY MOUTH DAILY   

 

 metoclopramide HCl 10 mg oral tablet  2016     TAKE 1 TABLET BY MOUTH 
FOUR TIMES DAILY 30 MINUTES BEFORE MEALS AND AT BEDTIME   

 

 imipramine HCl 50 mg oral tablet  2016     TAKE 2 TABLETS BY MOUTH EVERY 
MORNING AND 3 TABLETS BY MOUTH EVERY NIGHT AT BEDTIME   

 

 imipramine HCl 50 mg oral tablet  2016     TAKE 2 TABLETS BY MOUTH EVERY 
MORNING AND 3 TABLETS BY MOUTH EVERY NIGHT AT BEDTIME   

 

 tramadol 50 mg oral tablet  2016     take 1 tablet (50 mg) by oral route 
every 6 hours as needed   

 

 ProAir HFA 90 mcg/actuation inhalation HFA aerosol inhaler  2016     
inhale 1 - 2 puffs (90 - 180 mcg) by inhalation route every 6 hours as needed   









  

 

 Name  Start Date  Expiration Date  SIG  Comments

 

 tramadol 50 mg oral tablet  2013  1/15/2014  take 1 tablet (50 mg) by 
oral route every 6 hours as needed for 30 days   

 

 Reglan 10 mg oral tablet  2014  take 1 tablet (10 mg) by oral 
route 4 times per day 30 minutes before meals and at bedtime   

 

 omeprazole 40 mg oral capsule,delayed release(DR/EC)  10/13/2014  10/8/2015  
take 1 capsule (40 mg) by oral route once daily before a meal for 30 days   

 

 Synthroid 50 mcg oral tablet  10/23/2014  10/18/2015  take 1 tablet (50 mcg) 
by oral route once daily for 90 days   

 

 Zithromax Z-Paul 250 mg oral tablet  2014  Take 2 tablets the 
first day (500 mg) followed by 1 tablet (250 mg) days 2-5. for 5 days   

 

 Medrol (Paul) 4 mg oral tablets,dose pack  2014     take as directed   

 

 azithromycin 250 mg oral tablet  3/24/2015  3/29/2015  take 2 tablets (500 mg) 
by oral route once daily for 1 day then 1 tablet (250 mg) by oral route once 
daily for 4 days   

 

 Bactrim -160 mg oral tablet  2016  10/6/2016  take 1 tablet by oral 
route 2 times per day for 7 days   









 Discontinued 

 

 Name  Start Date  Discontinued Date  SIG  Comments

 

 nystatin-triamcinolone 100,000-0.1 unit/g-% topical cream  2014
  APPLY TO THE AFFECTED AREA TWICE DAILY , IN MORNING AND EVENING   

 

 Synthroid 50 mcg oral tablet  10/23/2014  2016  TAKE 1 TABLET (50 MCG) BY 
ORAL ROUTE ONCE DAILY FOR 90 DAYS   

 

 Reglan 10 mg oral tablet  2014  TAKE 1 TABLET (10 MG) BY 
ORAL ROUTE 4 TIMES PER DAY 30 MINUTES BEFORE MEALS AND AT BEDTIME   

 

 oxycodone 15 mg oral tablet  2014  take 1 tablet (15 mg) by 
oral route every 6 hours   

 

 Medrol (Paul) 4 mg oral tablets,dose pack  3/24/2015  2016  take as 
directed   







Problem List







 Description  Status  Onset

 

 Bipolar Disorder  Active   

 

 Chronic Obstructive Pulmonary Disease  Active   

 

 Gastroesophageal Reflux  Active   

 

 Hypothyroidism, Acquired  Active  10/31/2013

 

 Abdominal Pain, RUQ  Active   







Vital Signs







 Date  Time  BP-Sys(mm[Hg]  BP-Sabrina(mm[Hg])  HR(bpm)  RR(rpm)  Temp  WT  HT  HC  
BMI  BSA  BMI Percentile  O2 Sat(%)

 

 2016  7:10:00 PM  124 mmHg  68 mmHg  94 bpm  20 rpm  99 F  169 lbs  66.5 
in     26.87 kg/m2  1.90 m2     95 %

 

 2016  10:18:00 AM  148 mmHg  80 mmHg  82 bpm  18 rpm  97.8 F  169 lbs  
66.5 in     26.8684 kg/m  1.8965 m     97 %

 

 3/24/2015  6:08:00 PM  120 mmHg  78 mmHg  89 bpm  20 rpm  98.3 F  160 lbs     
            96 %

 

 2015  1:55:00 PM  154 mmHg  80 mmHg  88 bpm  20 rpm  97.3 F     66.5 in  
             

 

 2015  1:36:00 PM  166 mmHg  82 mmHg  94 bpm  24 rpm  96.2 F  162 lbs  66.5 
in     25.7555 kg/m  1.86 m2     98 %

 

 2015  3:17:00 PM  140 mmHg  76 mmHg  95 bpm  20 rpm  96 F                 
   99 %

 

 2015  1:48:00 PM  160 mmHg  78 mmHg  87 bpm  24 rpm  96.5 F               
     94 %

 

 2014  1:41:00 PM  156 mmHg  84 mmHg  94 bpm  20 rpm  96.3 F  166 lbs  
66.5 in     26.39 kg/m2  1.8796 m     97 %

 

 2014  9:43:00 AM  142 mmHg  64 mmHg  100 bpm  18 rpm  97.2 F  166 lbs  
66.5 in     26.3914 kg/m  1.88 m2      

 

 2014  5:05:00 PM  150 mmHg  62 mmHg  99 bpm  20 rpm  97.6 F  170 lbs  
66.5 in     27.03 kg/m2  1.9021 m     97 %

 

 2014  5:54:00 PM  145 mmHg  90 mmHg  93 bpm  18 rpm  97.5 F  170 lbs  
66.5 in     27.0274 kg/m  1.90 m2     96 %

 

 10/6/2014  3:21:00 PM  140 mmHg  82 mmHg  82 bpm  18 rpm  97.6 F  170 lbs  
66.5 in     27.03 kg/m2  1.9021 m      

 

 10/31/2013  10:02:00 AM  118 mmHg  64 mmHg  78 bpm  18 rpm  98 F  161 lbs  
66.5 in     25.5965 kg/m  1.85 m2      

 

 2013  1:28:00 PM  150 mmHg  80 mmHg  84 bpm  16 rpm  98.1 F  161 lbs  
66.5 in     25.60 kg/m2  1.85 m2     97 %

 

 2012  8:27:00 AM  136 mmHg  68 mmHg  72 bpm  18 rpm  97.6 F  157 lbs  
66.5 in     24.9606 kg/m  1.8279 m      







Social History







 Name  Description  Comments

 

 Tobacco  Current every day smoker   







History of Procedures







 Date Ordered  Description  Order Status

 

 2016 12:00 AM  COMPLETE CBC W/AUTO DIFF WBC  Returned

 

 2016 12:00 AM  COMPREHEN METABOLIC PANEL  Returned

 

 2016 12:00 AM  LIPID PANEL  Returned

 

 2016 12:00 AM  GLYCOSYLATED HEMOGLOBIN TEST  Returned

 

 2016 12:00 AM  ASSAY THYROID STIM HORMONE  Returned

 

 2012 12:00 AM  COMPUTER DX MAMMOGRAM ADD-ON  Returned

 

 2012 12:00 AM  COMPREHEN METABOLIC PANEL  Reviewed

 

 2012 12:00 AM  LIPID PANEL  Reviewed

 

 2012 12:00 AM  CHEST X-RAY 2VW FRONTAL&LATL  Reviewed

 

 2012 12:00 AM  CHEST X-RAY 2VW FRONTAL&LATL  Returned

 

 2013 12:00 AM  MAMMOGRAM SCREENING  Reviewed

 

 2013 12:00 AM  COMPLETE CBC W/AUTO DIFF WBC  Reviewed

 

 2013 12:00 AM  COMPREHEN METABOLIC PANEL  Reviewed

 

 2013 12:00 AM  ASSAY THYROID STIM HORMONE  Reviewed

 

 10/6/2014 12:00 AM  COMPLETE CBC W/AUTO DIFF WBC  Reviewed

 

 10/6/2014 12:00 AM  COMPREHEN METABOLIC PANEL  Reviewed

 

 10/6/2014 12:00 AM  GLYCOSYLATED HEMOGLOBIN TEST  Reviewed

 

 10/6/2014 12:00 AM  ASSAY THYROID STIM HORMONE  Reviewed

 

 10/6/2014 12:00 AM  INFLUENZA VIRUS VACCINE SPLIT VIRUS 3/> YRS IM  Reviewed

 

 2014 12:00 AM  CT ABDOMEN W/O & W/DYE  Reviewed

 

 2014 12:00 AM  CT PELVIS W/O & W/DYE  Reviewed

 

 2014 12:00 AM  CT ABD & PELV 1/> REGNS  Reviewed

 

 2014 12:00 AM  ECHO EXAM OF ABDOMEN  Returned

 

 2014 12:00 AM  COMPLETE CBC AUTOMATED  Returned

 

 2014 12:00 AM  COMPREHEN METABOLIC PANEL  Returned

 

 2014 12:00 AM  ASSAY OF AMYLASE  Returned

 

 2014 12:00 AM  ASSAY OF LIPASE  Returned

 

 2014 12:00 AM  COMPLETE CBC W/AUTO DIFF WBC  Returned







Results Summary







 Data and Description  Results

 

 2012 11:18 AM  WBC 10.5 RBC 4.95 HGB 14.30 g/dLHCT 42.70 %MCV 86.0 fLMCH 
28.90 pgMCHC 33.50 g/dLRDW CV 15.90 %MPV 10.20 fLPLT 345 %NEUT 69.20 %%LYMP 
23.20 %%MONO 6.30 %%EOS 1.0 %%BASO 0.30 %#NEUT 7.25 #LYMP 2.43 #MONO 0.66 #EOS 
0.11 #BASO 0.03 GLUCOSE 97.0 mg/dLSODIUM 136.0 mmol/LPOTASSIUM 4.20 mmol/
LCHLORIDE 102.0 mmol/LCO2 22.0 mmol/LBUN 7.0 mg/dLCREATININE 1.0 mg/dLSGOT/AST 
12.0 IU/LSGPT/ALT 6.0 IU/LALK PHOS 128.0 IU/LTOTAL PROTEIN 7.40 g/dLALBUMIN 
4.20 g/dLTOTAL BILI 0.40 mg/dLCALCIUM 9.30 mg/dLeGFR 55 TRIGLYCERIDES 299.0 mg/
dLCHOLESTEROL 293.0 mg/dLHDL 33.0 mg/dLLDL (CALC) 200.0 mg/dL

 

 10/24/2013 12:32 PM  WBC 11.1 RBC 5.19 HGB 14.80 g/dLHCT 44.0 %MCV 85.0 fLMCH 
28.50 pgMCHC 33.60 g/dLRDW CV 14.90 %MPV 10.50 fLPLT 357 %NEUT 63.70 %%LYMP 
26.0 %%MONO 8.50 %%EOS 1.50 %%BASO 0.30 %#NEUT 7.07 #LYMP 2.88 #MONO 0.94 #EOS 
0.17 #BASO 0.03 TSH 1.140 uIU/mLGLUCOSE 109.0 mg/dLSODIUM 136.0 mmol/LPOTASSIUM 
4.70 mmol/LCHLORIDE 104.0 mmol/LCO2 22.0 mmol/LBUN 20.0 mg/dLCREATININE 0.90 mg/
dLSGOT/AST 14.0 IU/LSGPT/ALT 13.0 IU/LALK PHOS 90.0 IU/LTOTAL PROTEIN 7.20 g/
dLALBUMIN 4.20 g/dLTOTAL BILI 0.40 mg/dLCALCIUM 9.70 mg/dLeGFR >60 mL/min/1.73 
m2

 

 10/6/2014 4:10 PM  Est Avg Glucose 159.9 mg/dLTSH 1.860 uIU/mLWBC 11.5 RBC 
5.21 HGB 14.60 g/dLHCT 43.0 %MCV 83.0 fLMCH 28.0 pgMCHC 34.0 g/dLRDW CV 15.20 %
MPV 10.20 fLPLT 311 %NEUT 65.30 %%LYMP 25.20 %%MONO 8.0 %%EOS 1.20 %%BASO 0.30 %
#NEUT 7.51 #LYMP 2.89 #MONO 0.92 #EOS 0.14 #BASO 0.03 EOS 1.0 %BASO 1.0 %WBC 
11.5 RBC 5.21 HGB 14.60 g/dLHCT 43.0 %MCV 83.0 fLMCH 28.0 pgMCHC 34.0 g/dLRDW 
CV 15.20 %MPV 10.20 fLPLT 311 %NEUT 65.30 %%LYMP 25.20 %%MONO 8.0 %%EOS 1.20 %%
BASO 0.30 %#NEUT 7.51 #LYMP 2.89 #MONO 0.92 #EOS 0.14 #BASO 0.03 GLUCOSE 97.0 mg
/dLSODIUM 136.0 mmol/LPOTASSIUM 4.30 mmol/LCHLORIDE 103.0 mmol/LCO2 22.0 mmol/
LBUN 28.0 mg/dLCREATININE 0.80 mg/dLSGOT/AST 15.0 IU/LSGPT/ALT 12.0 IU/LALK 
PHOS 121.0 IU/LTOTAL PROTEIN 7.60 g/dLALBUMIN 4.10 g/dLTOTAL BILI 0.20 mg/
dLCALCIUM 9.10 mg/dLeGFR 60 

 

 2014 2:40 PM  WBC 9.1 RBC 5.23 HGB 14.90 g/dLHCT 44.0 %MCV 84.0 fLMCH 
28.50 pgMCHC 33.90 g/dLRDW CV 16.0 %MPV 10.30 fLPLT 348 GLUCOSE 68.0 mg/
dLSODIUM 136.0 mmol/LPOTASSIUM 4.30 mmol/LCHLORIDE 102.0 mmol/LCO2 24.0 mmol/
LBUN 12.0 mg/dLCREATININE 0.90 mg/dLSGOT/AST 48.0 IU/LSGPT/.0 IU/LALK 
PHOS 293.0 IU/LTOTAL PROTEIN 7.30 g/dLALBUMIN 4.20 g/dLTOTAL BILI 0.40 mg/
dLCALCIUM 9.70 mg/dLeGFR 60 LIPASE 14.0 U/LAMYLASE 36 IU/L

 

 2014 5:45 AM  GLUCOSE 107.0 mg/dLSODIUM 136.0 mmol/LPOTASSIUM 3.90 mmol/
LCHLORIDE 102.0 mmol/LCO2 23.0 mmol/LBUN 10.0 mg/dLCREATININE 0.70 mg/dLSGOT/
AST 45.0 IU/LSGPT/.0 IU/LALK PHOS 287.0 IU/LTOTAL PROTEIN 6.80 g/
dLALBUMIN 3.60 g/dLTOTAL BILI 0.50 mg/dLCALCIUM 9.50 mg/dLeGFR 60 WBC 21.0 RBC 
5.05 HGB 13.80 g/dLHCT 42.70 %MCV 85.0 fLMCH 27.30 pgMCHC 32.30 g/dLRDW CV 
16.50 %MPV 10.40 fLPLT 301 %NEUT 78.40 %%LYMP 13.90 %%MONO 7.70 %%EOS 0.0 %%
BASO 0.0 %#NEUT 16.48 #LYMP 2.93 #MONO 1.62 #EOS 0.00 #BASO 0.01 ATYP LYMPHS 1+ 

 

 2014 6:05 AM  GLUCOSE 99.0 mg/dLSODIUM 137.0 mmol/LPOTASSIUM 3.60 mmol/
LCHLORIDE 102.0 mmol/LCO2 22.0 mmol/LBUN 9.0 mg/dLCREATININE 0.80 mg/dLSGOT/AST 
20.0 IU/LSGPT/ALT 81.0 IU/LALK PHOS 224.0 IU/LTOTAL PROTEIN 6.90 g/dLALBUMIN 
3.60 g/dLTOTAL BILI 0.60 mg/dLCALCIUM 9.50 mg/dLeGFR 60 WBC 11.2 RBC 4.99 HGB 
13.80 g/dLHCT 41.90 %MCV 84.0 fLMCH 27.70 pgMCHC 32.90 g/dLRDW CV 16.40 %MPV 
10.70 fLPLT 307 %NEUT 65.80 %%LYMP 21.60 %%MONO 8.70 %%EOS 3.70 %%BASO 0.20 %#
NEUT 7.34 #LYMP 2.41 #MONO 0.97 #EOS 0.41 #BASO 0.02 

 

 2016 11:05 AM  WBC 10.0 RBC 5.49 HGB 15.50 g/dLHCT 47.60 %MCV 87.0 fLMCH 
28.20 pgMCHC 32.60 g/dLRDW CV 15.30 %MPV 9.80 fLPLT 290 %NEUT 58.30 %%LYMP 
32.50 %%MONO 7.40 %%EOS 0.80 %%BASO 0.50 %#NEUT 5.83 #LYMP 3.25 #MONO 0.74 #EOS 
0.08 #BASO 0.05 Est Avg Glucose 114.0 mg/dLTSH 2.360 uIU/mLGLUCOSE 96.0 mg/
dLSODIUM 137.0 mmol/LPOTASSIUM 4.70 mmol/LCHLORIDE 102.0 mmol/LCO2 25.0 mmol/
LBUN 17.0 mg/dLCREATININE 1.0 mg/dLSGOT/AST 16.0 IU/LSGPT/ALT 13.0 IU/LALK PHOS 
109.0 IU/LTOTAL PROTEIN 7.30 g/dLALBUMIN 4.60 g/dLTOTAL BILI 0.40 mg/dLCALCIUM 
9.70 mg/dLeGFR 55 CHOLESTEROL 292.0 mg/dL







History Of Immunizations







 Name  Date Admin  Mfg Name  Mfg Code  Trade Name  Lot#  Route  Inj  Vis Given  
Vis Pub  CVX

 

 Influenza  10/18/2014  sanofi pasteur  [facility]  Fluzone Quadrivalent  [id number]  
Intramuscular  Left Deltoid  10/18/2014  2014  141







History of Past Illness







 Name  Date of Onset  Comments

 

 Bipolar Disorder      

 

 Gastroesophageal Reflux      

 

 Chronic Obstructive Pulmonary Disease      

 

 Hypothyroidism, Acquired  10/31/2013   

 

 Abdominal Pain, RUQ      

 

 Screening Mammogram  May  9 2012  9:23AM   

 

 Hyperlipidemia, unspecified  2012  8:37AM   

 

 Cough  2012  8:37AM   

 

 Pneumonia  2012  5:57PM   

 

 Upper Respiratory Infection  2012  4:57PM   

 

 Screening Mammogram For High Risk Patient  Aug 30 2012 10:13AM   

 

 Screening Mammogram  Sep 17 2013  8:10AM   

 

 Hypothyroidism, Acquired  Sep 17 2013  1:30PM   

 

 Chronic kidney disease  Sep 17 2013  1:30PM   

 

 Yeast Of Skin  Sep 17 2013  1:30PM   

 

 Hypothyroidism, Acquired  Oct 31 2013 10:07AM   

 

 Hypothyroidism, Acquired  Oct  6 2014  3:25PM   

 

 Hyperglycemia  Oct  6 2014  3:25PM   

 

 Esophageal Reflux  Oct  6 2014  3:25PM   

 

 Diabetes Mellitus, Type II  Oct 13 2014 10:43AM   

 

 Esophageal Reflux  Oct 13 2014 10:43AM   

 

 Bronchitis, Acute  2014  5:55PM   

 

 Upper Respiratory Infections  2014  5:55PM   

 

 Hernia, incisional  Dec 16 2014  5:12PM   

 

 Abdominal pain  Dec 16 2014  5:12PM   

 

 Abdominal Pain  Dec 17 2014  9:49AM   

 

 Abdominal pain, RUQ  Dec 22 2014  2:02PM   

 

 Chronic Cholecystitis  Dec 22 2014  2:02PM   

 

 Incisional Hernia: No Obstruction Or Gangrene  Dec 22 2014  2:02PM   

 

 Postoperative Follow-up: Cholecystectomy  2015  1:49PM   

 

 Postoperative Follow-Up Visit  2015  1:38PM   

 

 Postoperative Follow-Up Visit  2015  1:56PM   

 

 Bronchitis  Mar 24 2015  6:10PM   

 

 Postoperative Follow-Up Visit  2015  3:23PM   

 

 Screening Mammogram  May 11 2015 12:13PM   

 

 Hypothyroidism, Acquired  Sep 14 2016 10:25AM   

 

 Low Back Pain  Sep 14 2016 10:25AM   

 

 Hyperglycemia  Sep 14 2016 10:25AM   

 

 Bronchitis  Sep 29 2016  7:12PM   

 

 Wheezing  Sep 29 2016  7:12PM   

 

 Coughing  Sep 29 2016  7:12PM   







Payers







 Insurance Name  Company Name  Plan Name  Plan Number  Policy Number  Policy 
Group Number  Start Date

 

    Medicare Part A  Medicare RHC     [id number]     N/A

 

    Medicare Part B  [place]     [id number]     N/A

 

    [id number]     N/A

 

    [place] [employer] [place] [employer] [place]     
[id number]     N/A

 

    Medicare Part A  Medicare Part A     [id number]     N/A

 

    Medicare Part A  Medicare - Lab/Xray     [id number]     N/A







History of Encounters







 Visit Date  Visit Type  Provider

 

 2016  Office visit  Saqib HUNTER

 

 2016  Office visit   

 

 2016  Office visit  Navin Raza MD

 

 3/24/2015  Office visit  Carley BUSH

 

 2015  Office visit  Javier Preston MD

 

 2015  Office visit  Javier Preston MD

 

 2015  Office visit  Javier Preston MD

 

 2015  Office visit  Javier Preston MD

 

 2014  Hospital  Javier Preston MD

 

 2014  Hospital  Javier Preston MD

 

 2014  Office visit   

 

 2014  Office visit  Javier Preston MD

 

 2014  Office visit  Navin Raza MD

 

 2014  Office visit  Rosamaria HUNTER

 

 2014  Office visit  Sugey HUNTER

 

 10/13/2014  Office visit  Navin Raza MD

 

 10/6/2014  Office visit   

 

 10/6/2014  Office visit  Navin Raza MD

 

 10/31/2013  Office visit  Navin Raza MD

 

 2013  Office visit  Navin Raza MD

 

 2012  Office visit  Navin Raza MD

## 2018-11-04 NOTE — XMS REPORT
MU2 Ambulatory Summary

 Created on: 10/15/2018



Reina Griggs

External Reference #: [id number]

: 1945

Sex: Female



Demographics







 Address  [address]

[place]  [address]

 

 Home Phone  [phone]

 

 Preferred Language  English

 

 Marital Status  

 

 Hindu Affiliation  Unknown

 

 Race  White

 

 Ethnic Group  Not  or 





Author







 Author  Navin Raza

 

 [facility]

 

 Address  [address]

[place], [place]  [id number]



 

 Phone  [phone]







Care Team Providers







 Care Team Member Name  Role  Phone

 

 Navin Raza  PCP  [phone]

 

 Navin Raza  PreferredProvider  [phone]







Allergies and Adverse Reactions







 Name  Reaction  Notes

 

 PENICILLINS      







Plan of Treatment







 Planned Activity  Comments  Planned Date  Planned Time  Plan/Goal

 

 CBC With Auto Differential     2012  12:00 AM   

 

 Chest X-ray, PA and lateral     2012  12:00 AM   

 

 Diagnostic Mammogram     2012  12:00 AM   

 

 CBC with Auto     10/15/2018  12:00 AM   

 

 CMP (comprehensive metabolic panel)     10/15/2018  12:00 AM   

 

 Screening mammography, bilateral     2015  12:00 AM   







Medications







 Active 

 

 Name  Start Date  Estimated Completion Date  SIG  Comments

 

 nystatin-triamcinolone 100,000-0.1 unit/g-% topical cream  2013     apply 
to the affected area(s) by topical route 2 times per day in the morning and 
evening   

 

 tramadol 50 mg oral tablet  2014     TAKE 1 TABLET BY MOUTH EVERY 6 HOURS 
AS NEEDED   

 

 tramadol 50 mg oral tablet  10/23/2014     TAKE 1 TABLET BY MOUTH EVERY 6 
HOURS AS NEEDED   

 

 tramadol 50 mg oral tablet  10/23/2014     TAKE 1 TABLET BY MOUTH EVERY 6 
HOURS AS NEEDED   

 

 metoclopramide HCl 10 mg oral tablet  2014     TAKE 1 TABLET BY MOUTH 
FOUR TIMES DAILY 30 MINUTES BEFORE MEALS AND AT BEDTIME   

 

 Abdominal Belt 1 abdominal binder  2015     Use PRN for incisional support
   

 

 tramadol 50 mg oral tablet  2015     TAKE 1 TABLET BY MOUTH EVERY 6 HOURS 
AS NEEDED   

 

 tramadol 50 mg oral tablet  2015     TAKE 1 TABLET BY MOUTH EVERY 6 HOURS 
AS NEEDED   

 

 metoclopramide HCl 10 mg oral tablet  2015     TAKE 1 TABLET BY MOUTH 
FOUR TIMES DAILY 30 MINUTES BEFORE MEALS AND AT BEDTIME   

 

 levothyroxine 50 mcg oral tablet  2015     TAKE 1 TABLET BY MOUTH ONCE 
DAILY   

 

 metoclopramide HCl 10 mg oral tablet  2016     TAKE 1 TABLET BY MOUTH 
FOUR TIMES DAILY 30 MINUTES BEFORE MEALS AND AT BEDTIME   

 

 ProAir HFA 90 mcg/actuation inhalation HFA aerosol inhaler  2016     
inhale 1 - 2 puffs (90 - 180 mcg) by inhalation route every 6 hours as needed   

 

 ProAir HFA 90 mcg/actuation inhalation HFA aerosol inhaler  10/31/2016     
INHALE 1 TO 2 PUFFS BY MOUTH EVERY 6 HOURS AS NEEDED   

 

 Nebulizer and accessories  10/31/2016     Use as directed DX: COPD RAFAEL: 99   

 

 albuterol sulfate 2.5 mg /3 mL (0.083 %) inhalation solution for nebulization  
10/31/2016     inhale 3 milliliters (2.5 mg) by nebulization route every 8 
hours as needed   

 

 ProAir HFA 90 mcg/actuation inhalation HFA aerosol inhaler  10/31/2016     
INHALE 1 - 2 PUFFS (90 - 180 MCG) BY INHALATION ROUTE EVERY 6 HOURS AS NEEDED   

 

 Zetia 10 mg oral tablet  2018  1/3/2019  take 1 tablet (10 mg) by oral 
route once daily for 30 days   

 

 tramadol 50 mg oral tablet  2018     take 1 tablet by oral route every 4 
hours as needed   

 

 levothyroxine 50 mcg oral tablet  2018  TAKE 1 TABLET BY MOUTH 
EVERY DAY. NEED APPOINTMENT FOR FURTHER REFILLS   

 

 nystatin-triamcinolone 100,000-0.1 unit/gram-% topical ointment  2018     
apply to the affected area(s) by topical route 2 times per day   

 

 Vesicare 5 mg oral tablet  2018     TAKE 1 TABLET BY MOUTH DAILY   

 

 imipramine HCl 50 mg oral tablet  2018     TAKE 2 TABLETS BY MOUTH EVERY 
MORNING AND 3 EVERY NIGHT AT BEDTIME   

 

 metoclopramide HCl 10 mg oral tablet  2018     TAKE 1 TABLET BY MOUTH FOUR 
TIMES DAILY 30 MINUTES BEFORE MEALS AND AT BEDTIME FOR 30 DAYS   

 

 Duragesic 25 mcg/hr transdermal patch 72 hour  10/10/2018  2018  apply 1 
patch (25 mcg/hour) by transdermal route every 72 hours for 30 days   









  

 

 Name  Start Date  Expiration Date  SIG  Comments

 

 tramadol 50 mg oral tablet  2013  1/15/2014  take 1 tablet (50 mg) by 
oral route every 6 hours as needed for 30 days   

 

 Reglan 10 mg oral tablet  2014  take 1 tablet (10 mg) by oral 
route 4 times per day 30 minutes before meals and at bedtime   

 

 omeprazole 40 mg oral capsule,delayed release(DR/EC)  10/13/2014  10/8/2015  
take 1 capsule (40 mg) by oral route once daily before a meal for 30 days   

 

 Synthroid 50 mcg oral tablet  10/23/2014  10/18/2015  take 1 tablet (50 mcg) 
by oral route once daily for 90 days   

 

 Zithromax Z-Paul 250 mg oral tablet  2014  Take 2 tablets the 
first day (500 mg) followed by 1 tablet (250 mg) days 2-5. for 5 days   

 

 Medrol (Paul) 4 mg oral tablets,dose pack  2014     take as directed   

 

 azithromycin 250 mg oral tablet  3/24/2015  3/29/2015  take 2 tablets (500 mg) 
by oral route once daily for 1 day then 1 tablet (250 mg) by oral route once 
daily for 4 days   

 

 Bactrim -160 mg oral tablet  2016  10/6/2016  take 1 tablet by oral 
route 2 times per day for 7 days   

 

 prednisone 20 mg oral tablet  2018  take 2 tablet (20 mg) by 
oral route once daily   

 

 azithromycin 250 mg oral tablet  2018  take 2 tablets (500 mg) 
by oral route once daily for 1 day then 1 tablet (250 mg) by oral route once 
daily for 4 days   









 Discontinued 

 

 Name  Start Date  Discontinued Date  SIG  Comments

 

 nystatin-triamcinolone 100,000-0.1 unit/g-% topical cream  2014
  APPLY TO THE AFFECTED AREA TWICE DAILY , IN MORNING AND EVENING   

 

 Synthroid 50 mcg oral tablet  10/23/2014  2016  TAKE 1 TABLET (50 MCG) BY 
ORAL ROUTE ONCE DAILY FOR 90 DAYS   

 

 Reglan 10 mg oral tablet  2014  TAKE 1 TABLET (10 MG) BY 
ORAL ROUTE 4 TIMES PER DAY 30 MINUTES BEFORE MEALS AND AT BEDTIME   

 

 oxycodone 15 mg oral tablet  2014  take 1 tablet (15 mg) by 
oral route every 6 hours   

 

 Medrol (Paul) 4 mg oral tablets,dose pack  3/24/2015  2016  take as 
directed   







Problem List







 Description  Status  Onset

 

 Bipolar Disorder  Active   

 

 Chronic Obstructive Pulmonary Disease  Active   

 

 Gastroesophageal Reflux  Active   

 

 Hypothyroidism, acquired  Active  10/31/2013

 

 Abdominal Pain, RUQ  Active   







Vital Signs







 Date  Time  BP-Sys(mm[Hg]  BP-Sabrina(mm[Hg])  HR(bpm)  RR(rpm)  Temp  WT  HT  HC  
BMI  BSA  BMI Percentile  O2 Sat(%)

 

 10/10/2018  9:02:00 AM  152 mmHg  70 mmHg  86 bpm  16 rpm  97.7 F  151 lbs  
66.5 in     24.0067 kg/m  1.7927 m     98 %

 

 2018  5:27:00 PM  146 mmHg  82 mmHg  87 bpm  20 rpm  98.6 F  168 lbs  
66.5 in     26.71 kg/m2  1.89 m2     97 %

 

 2018  9:00:00 AM  148 mmHg  80 mmHg  78 bpm  16 rpm  96.2 F  168 lbs  66.5 
in     26.7094 kg/m  1.8909 m     99 %

 

 10/26/2016  5:04:00 PM  132 mmHg  74 mmHg  84 bpm  18 rpm  97.8 F  176.125 lbs
                 95 %

 

 2016  7:10:00 PM  124 mmHg  68 mmHg  94 bpm  20 rpm  99 F  169 lbs  66.5 
in     26.87 kg/m2  1.90 m2     95 %

 

 2016  10:18:00 AM  148 mmHg  80 mmHg  82 bpm  18 rpm  97.8 F  169 lbs  
66.5 in     26.8684 kg/m  1.8965 m     97 %

 

 3/24/2015  6:08:00 PM  120 mmHg  78 mmHg  89 bpm  20 rpm  98.3 F  160 lbs     
            96 %

 

 2015  1:55:00 PM  154 mmHg  80 mmHg  88 bpm  20 rpm  97.3 F     66.5 in  
             

 

 2015  1:36:00 PM  166 mmHg  82 mmHg  94 bpm  24 rpm  96.2 F  162 lbs  66.5 
in     25.7555 kg/m  1.8568 m     98 %

 

 2015  3:17:00 PM  140 mmHg  76 mmHg  95 bpm  20 rpm  96 F                 
   99 %

 

 2015  1:48:00 PM  160 mmHg  78 mmHg  87 bpm  24 rpm  96.5 F               
     94 %

 

 2014  1:41:00 PM  156 mmHg  84 mmHg  94 bpm  20 rpm  96.3 F  166 lbs  
66.5 in     26.3914 kg/m  1.8796 m     97 %

 

 2014  9:43:00 AM  142 mmHg  64 mmHg  100 bpm  18 rpm  97.2 F  166 lbs  
66.5 in     26.39 kg/m2  1.88 m2      

 

 2014  5:05:00 PM  150 mmHg  62 mmHg  99 bpm  20 rpm  97.6 F  170 lbs  
66.5 in     27.0274 kg/m  1.9021 m     97 %

 

 2014  5:54:00 PM  145 mmHg  90 mmHg  93 bpm  18 rpm  97.5 F  170 lbs  
66.5 in     27.03 kg/m2  1.90 m2     96 %

 

 10/6/2014  3:21:00 PM  140 mmHg  82 mmHg  82 bpm  18 rpm  97.6 F  170 lbs  
66.5 in     27.0274 kg/m  1.9021 m      

 

 10/31/2013  10:02:00 AM  118 mmHg  64 mmHg  78 bpm  18 rpm  98 F  161 lbs  
66.5 in     25.60 kg/m2  1.85 m2      

 

 2013  1:28:00 PM  150 mmHg  80 mmHg  84 bpm  16 rpm  98.1 F  161 lbs  
66.5 in     25.5965 kg/m  1.8511 m     97 %

 

 2012  8:27:00 AM  136 mmHg  68 mmHg  72 bpm  18 rpm  97.6 F  157 lbs  
66.5 in     24.9606 kg/m  1.83 m2      







Social History







 Name  Description  Comments

 

 Tobacco  Current every day smoker  10/26/2016 -  







History of Procedures







 Date Ordered  Description  Order Status

 

 2016 12:00 AM  COMPLETE CBC W/AUTO DIFF WBC  Reviewed

 

 2016 12:00 AM  COMPREHEN METABOLIC PANEL  Reviewed

 

 2016 12:00 AM  LIPID PANEL  Reviewed

 

 2016 12:00 AM  GLYCOSYLATED HEMOGLOBIN TEST  Reviewed

 

 2016 12:00 AM  ASSAY THYROID STIM HORMONE  Reviewed

 

 2016 12:00 AM  AIRWAY INHALATION TREATMENT  Reviewed

 

 10/26/2016 12:00 AM  COMPLETE CBC W/AUTO DIFF WBC  Reviewed

 

 10/26/2016 12:00 AM  COMPREHEN METABOLIC PANEL  Reviewed

 

 10/26/2016 12:00 AM  CHEST X-RAY 2VW FRONTAL&LATL  Reviewed

 

 2012 12:00 AM  COMPUTER DX MAMMOGRAM ADD-ON  Reviewed

 

 2012 12:00 AM  COMPREHEN METABOLIC PANEL  Reviewed

 

 2012 12:00 AM  LIPID PANEL  Reviewed

 

 2012 12:00 AM  CHEST X-RAY 2VW FRONTAL&LATL  Reviewed

 

 2012 12:00 AM  CHEST X-RAY 2VW FRONTAL&LATL  Reviewed

 

 2018 12:00 AM  MAMMOGRAM BOTH BREASTS  Returned

 

 2013 12:00 AM  MAMMOGRAM SCREENING  Reviewed

 

 2013 12:00 AM  COMPLETE CBC W/AUTO DIFF WBC  Reviewed

 

 2013 12:00 AM  COMPREHEN METABOLIC PANEL  Reviewed

 

 2013 12:00 AM  ASSAY THYROID STIM HORMONE  Reviewed

 

 10/6/2014 12:00 AM  COMPLETE CBC W/AUTO DIFF WBC  Reviewed

 

 10/6/2014 12:00 AM  COMPREHEN METABOLIC PANEL  Reviewed

 

 10/6/2014 12:00 AM  GLYCOSYLATED HEMOGLOBIN TEST  Reviewed

 

 10/6/2014 12:00 AM  ASSAY THYROID STIM HORMONE  Reviewed

 

 10/6/2014 12:00 AM  INFLUENZA VIRUS VACCINE SPLIT VIRUS 3/> YRS IM  Reviewed

 

 2014 12:00 AM  CT ABDOMEN W/O & W/DYE  Reviewed

 

 2014 12:00 AM  CT PELVIS W/O & W/DYE  Reviewed

 

 2014 12:00 AM  CT ABD & PELV 1/> REGNS  Reviewed

 

 2014 12:00 AM  ECHO EXAM OF ABDOMEN  Reviewed

 

 2014 12:00 AM  COMPLETE CBC AUTOMATED  Reviewed

 

 2014 12:00 AM  COMPREHEN METABOLIC PANEL  Reviewed

 

 2014 12:00 AM  ASSAY OF AMYLASE  Reviewed

 

 2014 12:00 AM  ASSAY OF LIPASE  Reviewed

 

 2014 12:00 AM  COMPLETE CBC W/AUTO DIFF WBC  Reviewed







Results Summary







 Date and Description  Results

 

 2012 11:18 AM  GLUCOSE 97.0 mg/dLSODIUM 136.0 mmol/LPOTASSIUM 4.20 mmol/
LCHLORIDE 102.0 mmol/LCO2 22.0 mmol/LBUN 7.0 mg/dLCREATININE 1.0 mg/dLSGOT/AST 
12.0 IU/LSGPT/ALT 6.0 IU/LALK PHOS 128.0 IU/LTOTAL PROTEIN 7.40 g/dLALBUMIN 
4.20 g/dLTOTAL BILI 0.40 mg/dLCALCIUM 9.30 mg/dLAGE 66 GFR NonAA 55 GFR AA 67 
eGFR 55 eGFR AA* 60 TRIGLYCERIDES 299.0 mg/dLCHOLESTEROL 293.0 mg/dLHDL 33.0 mg/
dLTOT CHOL/HDL 8.9 LDL (CALC) 200.0 mg/dL

 

 10/24/2013 12:32 PM  WBC 11.1 RBC 5.19 HGB 14.80 g/dLHCT 44.0 %MCV 85.0 fLMCH 
28.50 pgMCHC 33.60 g/dLRDW SD 46 RDW CV 14.90 %MPV 10.50 fLPLT 357 NRBC# 0.00 
NRBC% 0.0 %NEUT 63.70 %%LYMP 26.0 %%MONO 8.50 %%EOS 1.50 %%BASO 0.30 %#NEUT 
7.07 #LYMP 2.88 #MONO 0.94 #EOS 0.17 #BASO 0.03 MANUAL DIFF NOT IND TSH 1.140 
uIU/mLGLUCOSE 109.0 mg/dLSODIUM 136.0 mmol/LPOTASSIUM 4.70 mmol/LCHLORIDE 104.0 
mmol/LCO2 22.0 mmol/LBUN 20.0 mg/dLCREATININE 0.90 mg/dLSGOT/AST 14.0 IU/LSGPT/
ALT 13.0 IU/LALK PHOS 90.0 IU/LTOTAL PROTEIN 7.20 g/dLALBUMIN 4.20 g/dLTOTAL 
BILI 0.40 mg/dLCALCIUM 9.70 mg/dLAGE 68 GFR NonAA 62 GFR AA 75 eGFR >60 mL/min/
1.73 m2eGFR AA* >60 

 

 10/6/2014 4:10 PM  HGB A1C 7.20 %Est Avg Glucose 159.9 mg/dLTSH 1.860 uIU/
mLWBC 11.5 RBC 5.21 HGB 14.60 g/dLHCT 43.0 %MCV 83.0 fLMCH 28.0 pgMCHC 34.0 g/
dLRDW SD 46 RDW CV 15.20 %MPV 10.20 fLPLT 311 NRBC# 0.00 NRBC% 0.0 %NEUT 65.30 %
%LYMP 25.20 %%MONO 8.0 %%EOS 1.20 %%BASO 0.30 %#NEUT 7.51 #LYMP 2.89 #MONO 0.92 
#EOS 0.14 #BASO 0.03 MANUAL DIFF SEE BELOW SEGS 69 LYMPHS 20 MONOS 9 EOS 1.0 %
BASO 1.0 %WBC 11.5 RBC 5.21 HGB 14.60 g/dLHCT 43.0 %MCV 83.0 fLMCH 28.0 pgMCHC 
34.0 g/dLRDW SD 46 RDW CV 15.20 %MPV 10.20 fLPLT 311 NRBC# 0.00 NRBC% 0.0 %NEUT 
65.30 %%LYMP 25.20 %%MONO 8.0 %%EOS 1.20 %%BASO 0.30 %#NEUT 7.51 #LYMP 2.89 #
MONO 0.92 #EOS 0.14 #BASO 0.03 MANUAL DIFF PENDING GLUCOSE 97.0 mg/dLSODIUM 
136.0 mmol/LPOTASSIUM 4.30 mmol/LCHLORIDE 103.0 mmol/LCO2 22.0 mmol/LBUN 28.0 mg
/dLCREATININE 0.80 mg/dLSGOT/AST 15.0 IU/LSGPT/ALT 12.0 IU/LALK PHOS 121.0 IU/
LTOTAL PROTEIN 7.60 g/dLALBUMIN 4.10 g/dLTOTAL BILI 0.20 mg/dLCALCIUM 9.10 mg/
dLAGE 69 GFR NonAA 71 GFR AA 86 eGFR 60 eGFR AA* 60 

 

 2014 2:40 PM  WBC 9.1 RBC 5.23 HGB 14.90 g/dLHCT 44.0 %MCV 84.0 fLMCH 
28.50 pgMCHC 33.90 g/dLRDW SD 48 RDW CV 16.0 %MPV 10.30 fLPLT 348 NRBC# 0.00 
NRBC% 0.0 GLUCOSE 68.0 mg/dLSODIUM 136.0 mmol/LPOTASSIUM 4.30 mmol/LCHLORIDE 
102.0 mmol/LCO2 24.0 mmol/LBUN 12.0 mg/dLCREATININE 0.90 mg/dLSGOT/AST 48.0 IU/
LSGPT/.0 IU/LALK PHOS 293.0 IU/LTOTAL PROTEIN 7.30 g/dLALBUMIN 4.20 g/
dLTOTAL BILI 0.40 mg/dLCALCIUM 9.70 mg/dLAGE 69 GFR NonAA 62 GFR AA 75 eGFR 60 
eGFR AA* 60 LIPASE 14.0 U/LAMYLASE 36 IU/L

 

 2014 5:45 AM  GLUCOSE 107.0 mg/dLSODIUM 136.0 mmol/LPOTASSIUM 3.90 mmol/
LCHLORIDE 102.0 mmol/LCO2 23.0 mmol/LBUN 10.0 mg/dLCREATININE 0.70 mg/dLSGOT/
AST 45.0 IU/LSGPT/.0 IU/LALK PHOS 287.0 IU/LTOTAL PROTEIN 6.80 g/
dLALBUMIN 3.60 g/dLTOTAL BILI 0.50 mg/dLCALCIUM 9.50 mg/dLAGE 69 GFR NonAA 83 
GFR  eGFR 60 eGFR AA* 60 WBC 21.0 RBC 5.05 HGB 13.80 g/dLHCT 42.70 %MCV 
85.0 fLMCH 27.30 pgMCHC 32.30 g/dLRDW SD 50 RDW CV 16.50 %MPV 10.40 fLPLT 301 
NRBC# 0.00 NRBC% 0.0 %NEUT 78.40 %%LYMP 13.90 %%MONO 7.70 %%EOS 0.0 %%BASO 0.0 %
#NEUT 16.48 #LYMP 2.93 #MONO 1.62 #EOS 0.00 #BASO 0.01 MANUAL DIFF SEE BELOW 
SEGS 75 BANDS 1 LYMPHS 17 MONOS 7 ATYP LYMPHS 1+ 

 

 2016 11:05 AM  WBC 10.0 RBC 5.49 HGB 15.50 g/dLHCT 47.60 %MCV 87.0 fLMCH 
28.20 pgMCHC 32.60 g/dLRDW SD 48 RDW CV 15.30 %MPV 9.80 fLPLT 290 NRBC# 0.00 
NRBC% 0.0 %NEUT 58.30 %%LYMP 32.50 %%MONO 7.40 %%EOS 0.80 %%BASO 0.50 %#NEUT 
5.83 #LYMP 3.25 #MONO 0.74 #EOS 0.08 #BASO 0.05 MANUAL DIFF NOT IND HGB A1C 
5.60 %Est Avg Glucose 114.0 mg/dLTSH 2.360 uIU/mLGLUCOSE 96.0 mg/dLSODIUM 137.0 
mmol/LPOTASSIUM 4.70 mmol/LCHLORIDE 102.0 mmol/LCO2 25.0 mmol/LBUN 17.0 mg/
dLCREATININE 1.0 mg/dLSGOT/AST 16.0 IU/LSGPT/ALT 13.0 IU/LALK PHOS 109.0 IU/
LTOTAL PROTEIN 7.30 g/dLALBUMIN 4.60 g/dLTOTAL BILI 0.40 mg/dLCALCIUM 9.70 mg/
dLAGE 71 GFR NonAA 55 GFR AA 67 eGFR 55 eGFR AA* >60 CHOLESTEROL 292.0 mg/dL

 

 10/26/2016 5:39 PM  WBC 11.6 RBC 5.52 HGB 15.50 g/dLHCT 48.40 %MCV 88.0 fLMCH 
28.10 pgMCHC 32.0 g/dLRDW SD 47 RDW CV 14.60 %MPV 9.70 fLPLT 298 NRBC# 0.00 NRBC
% 0.0 %NEUT 53.80 %%LYMP 32.20 %%MONO 8.80 %%EOS 4.10 %%BASO 0.60 %#NEUT 6.22 #
LYMP 3.72 #MONO 1.02 #EOS 0.48 #BASO 0.07 MANUAL DIFF NOT IND GLUCOSE 92.0 mg/
dLSODIUM 138.0 mmol/LPOTASSIUM 4.50 mmol/LCHLORIDE 99.0 mmol/LCO2 26.0 mmol/
LBUN 13.0 mg/dLCREATININE 1.0 mg/dLSGOT/AST 15.0 IU/LSGPT/ALT 15.0 IU/LALK PHOS 
125.0 IU/LTOTAL PROTEIN 7.50 g/dLALBUMIN 4.30 g/dLTOTAL BILI 0.30 mg/dLCALCIUM 
9.60 mg/dLAGE 71 GFR NonAA 55 GFR AA 67 eGFR 55 eGFR AA* >60 







History Of Immunizations







 Name  Date Admin  Mfg Name  Mfg Code  Trade Name  Lot#  Route  Inj  Vis Given  
Vis Pub  CVX

 

 Influenza  10/18/2014  sanofi pasteur  PMC  Fluzone Quadrivalent  [id number]  
Intramuscular  Left Deltoid  10/18/2014  2014  141







History of Past Illness







 Name  Date of Onset  Comments

 

 Bipolar Disorder      

 

 Gastroesophageal Reflux      

 

 Chronic Obstructive Pulmonary Disease      

 

 Hypothyroidism, acquired  10/31/2013   

 

 Abdominal Pain, RUQ      

 

 Screening Mammogram  May  9 2012  9:23AM   

 

 Hyperlipidemia, unspecified  2012  8:37AM   

 

 Cough  2012  8:37AM   

 

 Pneumonia  2012  5:57PM   

 

 Upper Respiratory Infection  2012  4:57PM   

 

 Screening Mammogram For High Risk Patient  Aug 30 2012 10:13AM   

 

 Screening Mammogram  Sep 17 2013  8:10AM   

 

 Hypothyroidism, Acquired  Sep 17 2013  1:30PM   

 

 Chronic kidney disease  Sep 17 2013  1:30PM   

 

 Yeast Of Skin  Sep 17 2013  1:30PM   

 

 Hypothyroidism, Acquired  Oct 31 2013 10:07AM   

 

 Hypothyroidism, Acquired  Oct  6 2014  3:25PM   

 

 Hyperglycemia  Oct  6 2014  3:25PM   

 

 Esophageal Reflux  Oct  6 2014  3:25PM   

 

 Diabetes Mellitus, Type II  Oct 13 2014 10:43AM   

 

 Esophageal Reflux  Oct 13 2014 10:43AM   

 

 Bronchitis, Acute  2014  5:55PM   

 

 Upper Respiratory Infections  2014  5:55PM   

 

 Hernia, incisional  Dec 16 2014  5:12PM   

 

 Abdominal pain  Dec 16 2014  5:12PM   

 

 Abdominal Pain  Dec 17 2014  9:49AM   

 

 Abdominal pain, RUQ  Dec 22 2014  2:02PM   

 

 Chronic Cholecystitis  Dec 22 2014  2:02PM   

 

 Incisional Hernia: No Obstruction Or Gangrene  Dec 22 2014  2:02PM   

 

 Postoperative Follow-up: Cholecystectomy  2015  1:49PM   

 

 Postoperative Follow-Up Visit  2015  1:38PM   

 

 Postoperative Follow-Up Visit  2015  1:56PM   

 

 Bronchitis  Mar 24 2015  6:10PM   

 

 Postoperative Follow-Up Visit  2015  3:23PM   

 

 Screening Mammogram  May 11 2015 12:13PM   

 

 Hypothyroidism, Acquired  Sep 14 2016 10:25AM   

 

 Low Back Pain  Sep 14 2016 10:25AM   

 

 Hyperglycemia  Sep 14 2016 10:25AM   

 

 Bronchitis  Sep 29 2016  7:12PM   

 

 Wheezing  Sep 29 2016  7:12PM   

 

 Coughing  Sep 29 2016  7:12PM   

 

 Cough  Oct 26 2016  5:06PM   

 

 Shortness of breath  Oct 26 2016  5:06PM   

 

 Chronic obstructive pulmonary disease, unspecified COPD type  Oct 26 2016  5:
06PM   

 

 Encounter for screening mammogram for breast cancer  2018 10:38AM   

 

 Hypertension  2018  9:09AM   

 

 Hyperlipidemia, unspecified  2018  9:09AM   

 

 Fibromyalgia  2018  9:09AM   

 

 COPD exacerbation  Sep 25 2018  5:32PM   

 

 Back strain  Sep 25 2018  5:32PM   

 

 Liver cancer, primary, with metastasis from liver to other site  Oct 15 2018  8
:07AM   







Payers







 Insurance Name  Company Name  Plan Name  Plan Number  Policy Number  Policy 
Group Number  Start Date

 

    Medicare RHC  Medicare RHC     [id number]     N/A

 

    Medicare Part B  Medicare [place]     [id number]     N/A

 

    BCBS  [facility]     [id number]     N/A

 

    [place] [employer] [facility] Medical Assistance Prog     
[id number]     N/A

 

    Medicare Part A  Medicare Part A     [id number]     N/A

 

    Medicare Part A  Medicare - Lab/Xray     [id number]     N/A

 

    Medicare RHC  Medicare RHC     [id number]     N/A







History of Encounters







 Visit Date  Visit Type  Provider

 

 10/10/2018  Office visit  Navin Raza MD

 

 2018  Office visit  Sasha Cole APRN

 

 2018  Office visit  Navin Raza MD

 

 10/26/2016  Office visit  Saqib Bustillos APRN

 

 2016  Office visit  Saqib Bustillos APRN

 

 2016  Office visit   

 

 2016  Office visit  Navin Raza MD

 

 3/24/2015  Office visit  Carley BUSH

 

 2015  Office visit  Javier Preston MD

 

 2015  Office visit  Javier Preston MD

 

 2015  Office visit  Javier Preston MD

 

 2015  Office visit  Javier Preston MD

 

 2014  Hospital  Javier Preston MD

 

 2014  Hospital  Javier Preston MD

 

 2014  Office visit   

 

 2014  Office visit  Javier Preston MD

 

 2014  Office visit  Navin Raza MD

 

 2014  Office visit  Rosamaria Maria APRN

 

 2014  Office visit  Sugey Hawkins APRN

 

 10/13/2014  Office visit  Navin Raza MD

 

 10/6/2014  Office visit   

 

 10/6/2014  Office visit  Navin Raza MD

 

 10/31/2013  Office visit  Navin Raza MD

 

 2013  Office visit  Navin Raza MD

 

 2012  Office visit  Navin Raza MD

## 2018-11-04 NOTE — XMS REPORT
MU2 Ambulatory Summary

 Created on: 2016



Reina Griggs

External Reference #: [id number]

: 1945

Sex: Female



Demographics







 Address  [address]

[place]  [address]

 

 Home Phone  [phone]

 

 Preferred Language  English

 

 Marital Status  

 

 Pentecostal Affiliation  Unknown

 

 Race  White

 

 Ethnic Group  Not  or 





Author







 Author  Saqib Bustillos

 

 [facility]

 

 Address  [address]

[place], [place]  [id number]



 

 Phone  [phone]







Care Team Providers







 Care Team Member Name  Role  Phone

 

 Saqib Bustillos  PCP  [phone]

 

 Navin Raza  PreferredProvider  Unavailable







Allergies and Adverse Reactions







 Name  Reaction  Notes

 

 PENICILLINS      







Plan of Treatment







 Planned Activity  Comments  Planned Date  Planned Time  Plan/Goal

 

 Aerosol Tx     2016  12:00 AM   

 

 CBC With Auto Differential     2012  12:00 AM   

 

 Chest X-ray, PA and lateral     2012  12:00 AM   

 

 Diagnostic Mammogram     2012  12:00 AM   

 

 Screening mammography, bilateral     2015  12:00 AM   







Medications







 Active 

 

 Name  Start Date  Estimated Completion Date  SIG  Comments

 

 nystatin-triamcinolone 100,000-0.1 unit/g-% topical cream  2013     apply 
to the affected area(s) by topical route 2 times per day in the morning and 
evening   

 

 imipramine HCl 50 mg oral tablet  2014     TAKE 2 TABLETS BY MOUTH EVERY 
MORNING AND 3 TABLETS BY MOUTH EVERY NIGHT AT BEDTIME   

 

 Vesicare 5 mg oral tablet  2014     TAKE 1 TABLET BY MOUTH DAILY   

 

 tramadol 50 mg oral tablet  2014     TAKE 1 TABLET BY MOUTH EVERY 6 HOURS 
AS NEEDED   

 

 imipramine HCl 50 mg oral tablet  2014     TAKE 2 TABLETS BY MOUTH EVERY 
MORNING AND 3 TABLETS BY MOUTH EVERY NIGHT AT BEDTIME   

 

 imipramine HCl 50 mg oral tablet  10/20/2014     TAKE 2 TABLETS BY MOUTH EVERY 
MORNING AND 3 TABLETS BY MOUTH EVERY NIGHT AT BEDTIME   

 

 tramadol 50 mg oral tablet  10/23/2014     TAKE 1 TABLET BY MOUTH EVERY 6 
HOURS AS NEEDED   

 

 Vesicare 5 mg oral tablet  10/23/2014     TAKE 1 TABLET BY MOUTH DAILY   

 

 tramadol 50 mg oral tablet  10/23/2014     TAKE 1 TABLET BY MOUTH EVERY 6 
HOURS AS NEEDED   

 

 Vesicare 5 mg oral tablet  10/23/2014     TAKE 1 TABLET BY MOUTH DAILY   

 

 metoclopramide HCl 10 mg oral tablet  2014     TAKE 1 TABLET BY MOUTH 
FOUR TIMES DAILY 30 MINUTES BEFORE MEALS AND AT BEDTIME   

 

 Abdominal Belt 1 abdominal binder  2015     Use PRN for incisional support
   

 

 imipramine HCl 50 mg oral tablet  2015     TAKE 2 TABLETS BY MOUTH EVERY 
MORNING AND 3 TABLETS BY MOUTH EVERY NIGHT AT BEDTIME   

 

 Vesicare 5 mg oral tablet  3/10/2015     TAKE 1 TABLET BY MOUTH DAILY   

 

 tramadol 50 mg oral tablet  2015     TAKE 1 TABLET BY MOUTH EVERY 6 HOURS 
AS NEEDED   

 

 tramadol 50 mg oral tablet  2015     TAKE 1 TABLET BY MOUTH EVERY 6 HOURS 
AS NEEDED   

 

 imipramine HCl 50 mg oral tablet  2015     TAKE 2 TABLETS BY MOUTH EVERY 
MORNING AND 3 TABLETS BY MOUTH EVERY NIGHT AT BEDTIME   

 

 metoclopramide HCl 10 mg oral tablet  2015     TAKE 1 TABLET BY MOUTH 
FOUR TIMES DAILY 30 MINUTES BEFORE MEALS AND AT BEDTIME   

 

 levothyroxine 50 mcg oral tablet  2015     TAKE 1 TABLET BY MOUTH ONCE 
DAILY   

 

 imipramine HCl 50 mg oral tablet  2015     TAKE 2 TABLETS BY MOUTH EVERY 
MORNING AND 3 TABLETS BY MOUTH EVERY NIGHT AT BEDTIME   

 

 Vesicare 5 mg oral tablet  2016     TAKE 1 TABLET BY MOUTH DAILY   

 

 metoclopramide HCl 10 mg oral tablet  2016     TAKE 1 TABLET BY MOUTH 
FOUR TIMES DAILY 30 MINUTES BEFORE MEALS AND AT BEDTIME   

 

 imipramine HCl 50 mg oral tablet  2016     TAKE 2 TABLETS BY MOUTH EVERY 
MORNING AND 3 TABLETS BY MOUTH EVERY NIGHT AT BEDTIME   

 

 imipramine HCl 50 mg oral tablet  2016     TAKE 2 TABLETS BY MOUTH EVERY 
MORNING AND 3 TABLETS BY MOUTH EVERY NIGHT AT BEDTIME   

 

 tramadol 50 mg oral tablet  2016     take 1 tablet (50 mg) by oral route 
every 6 hours as needed   

 

 ProAir HFA 90 mcg/actuation inhalation HFA aerosol inhaler  2016     
inhale 1 - 2 puffs (90 - 180 mcg) by inhalation route every 6 hours as needed   

 

 ProAir HFA 90 mcg/actuation inhalation HFA aerosol inhaler  10/31/2016     
INHALE 1 TO 2 PUFFS BY MOUTH EVERY 6 HOURS AS NEEDED   

 

 Nebulizer and accessories  10/31/2016     Use as directed DX: COPD RAFAEL: 99   

 

 albuterol sulfate 2.5 mg /3 mL (0.083 %) inhalation solution for nebulization  
10/31/2016     inhale 3 milliliters (2.5 mg) by nebulization route every 8 
hours as needed   

 

 ProAir HFA 90 mcg/actuation inhalation HFA aerosol inhaler  10/31/2016     
INHALE 1 - 2 PUFFS (90 - 180 MCG) BY INHALATION ROUTE EVERY 6 HOURS AS NEEDED   









  

 

 Name  Start Date  Expiration Date  SIG  Comments

 

 tramadol 50 mg oral tablet  2013  1/15/2014  take 1 tablet (50 mg) by 
oral route every 6 hours as needed for 30 days   

 

 Reglan 10 mg oral tablet  2014  take 1 tablet (10 mg) by oral 
route 4 times per day 30 minutes before meals and at bedtime   

 

 omeprazole 40 mg oral capsule,delayed release(DR/EC)  10/13/2014  10/8/2015  
take 1 capsule (40 mg) by oral route once daily before a meal for 30 days   

 

 Synthroid 50 mcg oral tablet  10/23/2014  10/18/2015  take 1 tablet (50 mcg) 
by oral route once daily for 90 days   

 

 Zithromax Z-Paul 250 mg oral tablet  2014  Take 2 tablets the 
first day (500 mg) followed by 1 tablet (250 mg) days 2-5. for 5 days   

 

 Medrol (Paul) 4 mg oral tablets,dose pack  2014     take as directed   

 

 azithromycin 250 mg oral tablet  3/24/2015  3/29/2015  take 2 tablets (500 mg) 
by oral route once daily for 1 day then 1 tablet (250 mg) by oral route once 
daily for 4 days   

 

 Bactrim -160 mg oral tablet  2016  10/6/2016  take 1 tablet by oral 
route 2 times per day for 7 days   









 Discontinued 

 

 Name  Start Date  Discontinued Date  SIG  Comments

 

 nystatin-triamcinolone 100,000-0.1 unit/g-% topical cream  2014
  APPLY TO THE AFFECTED AREA TWICE DAILY , IN MORNING AND EVENING   

 

 Synthroid 50 mcg oral tablet  10/23/2014  2016  TAKE 1 TABLET (50 MCG) BY 
ORAL ROUTE ONCE DAILY FOR 90 DAYS   

 

 Reglan 10 mg oral tablet  2014  TAKE 1 TABLET (10 MG) BY 
ORAL ROUTE 4 TIMES PER DAY 30 MINUTES BEFORE MEALS AND AT BEDTIME   

 

 oxycodone 15 mg oral tablet  2014  take 1 tablet (15 mg) by 
oral route every 6 hours   

 

 Medrol (Paul) 4 mg oral tablets,dose pack  3/24/2015  2016  take as 
directed   







Problem List







 Description  Status  Onset

 

 Bipolar Disorder  Active   

 

 Chronic Obstructive Pulmonary Disease  Active   

 

 Gastroesophageal Reflux  Active   

 

 Hypothyroidism, Acquired  Active  10/31/2013

 

 Abdominal Pain, RUQ  Active   







Vital Signs







 Date  Time  BP-Sys(mm[Hg]  BP-Sabrina(mm[Hg])  HR(bpm)  RR(rpm)  Temp  WT  HT  HC  
BMI  BSA  BMI Percentile  O2 Sat(%)

 

 10/26/2016  5:04:00 PM  132 mmHg  74 mmHg  84 bpm  18 rpm  97.8 F  176.125 lbs
                 95 %

 

 2016  7:10:00 PM  124 mmHg  68 mmHg  94 bpm  20 rpm  99 F  169 lbs  66.5 
in     26.8684 kg/m  1.8965 m     95 %

 

 2016  10:18:00 AM  148 mmHg  80 mmHg  82 bpm  18 rpm  97.8 F  169 lbs  
66.5 in     26.87 kg/m2  1.90 m2     97 %

 

 3/24/2015  6:08:00 PM  120 mmHg  78 mmHg  89 bpm  20 rpm  98.3 F  160 lbs     
            96 %

 

 2015  1:55:00 PM  154 mmHg  80 mmHg  88 bpm  20 rpm  97.3 F     66.5 in  
             

 

 2015  1:36:00 PM  166 mmHg  82 mmHg  94 bpm  24 rpm  96.2 F  162 lbs  66.5 
in     25.7555 kg/m  1.8568 m     98 %

 

 2015  3:17:00 PM  140 mmHg  76 mmHg  95 bpm  20 rpm  96 F                 
   99 %

 

 2015  1:48:00 PM  160 mmHg  78 mmHg  87 bpm  24 rpm  96.5 F               
     94 %

 

 2014  1:41:00 PM  156 mmHg  84 mmHg  94 bpm  20 rpm  96.3 F  166 lbs  
66.5 in     26.39 kg/m2  1.88 m2     97 %

 

 2014  9:43:00 AM  142 mmHg  64 mmHg  100 bpm  18 rpm  97.2 F  166 lbs  
66.5 in     26.3914 kg/m  1.8796 m      

 

 2014  5:05:00 PM  150 mmHg  62 mmHg  99 bpm  20 rpm  97.6 F  170 lbs  
66.5 in     27.03 kg/m2  1.90 m2     97 %

 

 2014  5:54:00 PM  145 mmHg  90 mmHg  93 bpm  18 rpm  97.5 F  170 lbs  
66.5 in     27.0274 kg/m  1.9021 m     96 %

 

 10/6/2014  3:21:00 PM  140 mmHg  82 mmHg  82 bpm  18 rpm  97.6 F  170 lbs  
66.5 in     27.03 kg/m2  1.90 m2      

 

 10/31/2013  10:02:00 AM  118 mmHg  64 mmHg  78 bpm  18 rpm  98 F  161 lbs  
66.5 in     25.5965 kg/m  1.8511 m      

 

 2013  1:28:00 PM  150 mmHg  80 mmHg  84 bpm  16 rpm  98.1 F  161 lbs  
66.5 in     25.60 kg/m2  1.85 m2     97 %

 

 2012  8:27:00 AM  136 mmHg  68 mmHg  72 bpm  18 rpm  97.6 F  157 lbs  
66.5 in     24.9606 kg/m  1.8279 m      







Social History







 Name  Description  Comments

 

 Tobacco  Current every day smoker  10/26/2016 -  







History of Procedures







 Date Ordered  Description  Order Status

 

 2016 12:00 AM  COMPLETE CBC W/AUTO DIFF WBC  Returned

 

 2016 12:00 AM  COMPREHEN METABOLIC PANEL  Returned

 

 2016 12:00 AM  LIPID PANEL  Returned

 

 2016 12:00 AM  GLYCOSYLATED HEMOGLOBIN TEST  Returned

 

 2016 12:00 AM  ASSAY THYROID STIM HORMONE  Returned

 

 10/26/2016 12:00 AM  COMPLETE CBC W/AUTO DIFF WBC  Returned

 

 10/26/2016 12:00 AM  COMPREHEN METABOLIC PANEL  Returned

 

 10/26/2016 12:00 AM  CHEST X-RAY 2VW FRONTAL&LATL  Returned

 

 2012 12:00 AM  COMPUTER DX MAMMOGRAM ADD-ON  Returned

 

 2012 12:00 AM  COMPREHEN METABOLIC PANEL  Reviewed

 

 2012 12:00 AM  LIPID PANEL  Reviewed

 

 2012 12:00 AM  CHEST X-RAY 2VW FRONTAL&LATL  Reviewed

 

 2012 12:00 AM  CHEST X-RAY 2VW FRONTAL&LATL  Returned

 

 2013 12:00 AM  MAMMOGRAM SCREENING  Reviewed

 

 2013 12:00 AM  COMPLETE CBC W/AUTO DIFF WBC  Reviewed

 

 2013 12:00 AM  COMPREHEN METABOLIC PANEL  Reviewed

 

 2013 12:00 AM  ASSAY THYROID STIM HORMONE  Reviewed

 

 10/6/2014 12:00 AM  COMPLETE CBC W/AUTO DIFF WBC  Reviewed

 

 10/6/2014 12:00 AM  COMPREHEN METABOLIC PANEL  Reviewed

 

 10/6/2014 12:00 AM  GLYCOSYLATED HEMOGLOBIN TEST  Reviewed

 

 10/6/2014 12:00 AM  ASSAY THYROID STIM HORMONE  Reviewed

 

 10/6/2014 12:00 AM  INFLUENZA VIRUS VACCINE SPLIT VIRUS 3/> YRS IM  Reviewed

 

 2014 12:00 AM  CT ABDOMEN W/O & W/DYE  Reviewed

 

 2014 12:00 AM  CT PELVIS W/O & W/DYE  Reviewed

 

 2014 12:00 AM  CT ABD & PELV 1/> REGNS  Reviewed

 

 2014 12:00 AM  ECHO EXAM OF ABDOMEN  Returned

 

 2014 12:00 AM  COMPLETE CBC AUTOMATED  Returned

 

 2014 12:00 AM  COMPREHEN METABOLIC PANEL  Returned

 

 2014 12:00 AM  ASSAY OF AMYLASE  Returned

 

 2014 12:00 AM  ASSAY OF LIPASE  Returned

 

 2014 12:00 AM  COMPLETE CBC W/AUTO DIFF WBC  Returned







Results Summary







 Data and Description  Results

 

 2012 11:18 AM  WBC 10.5 RBC 4.95 HGB 14.30 g/dLHCT 42.70 %MCV 86.0 fLMCH 
28.90 pgMCHC 33.50 g/dLRDW SD 50 RDW CV 15.90 %MPV 10.20 fLPLT 345 NRBC# 0.00 
NRBC% 0.0 %NEUT 69.20 %%LYMP 23.20 %%MONO 6.30 %%EOS 1.0 %%BASO 0.30 %#NEUT 
7.25 #LYMP 2.43 #MONO 0.66 #EOS 0.11 #BASO 0.03 MANUAL DIFF NOT IND GLUCOSE 
97.0 mg/dLSODIUM 136.0 mmol/LPOTASSIUM 4.20 mmol/LCHLORIDE 102.0 mmol/LCO2 22.0 
mmol/LBUN 7.0 mg/dLCREATININE 1.0 mg/dLSGOT/AST 12.0 IU/LSGPT/ALT 6.0 IU/LALK 
PHOS 128.0 IU/LTOTAL PROTEIN 7.40 g/dLALBUMIN 4.20 g/dLTOTAL BILI 0.40 mg/
dLCALCIUM 9.30 mg/dLAGE 66 GFR NonAA 55 GFR AA 67 eGFR 55 eGFR AA* 60 
TRIGLYCERIDES 299.0 mg/dLCHOLESTEROL 293.0 mg/dLHDL 33.0 mg/dLTOT CHOL/HDL 8.9 
LDL (CALC) 200.0 mg/dL

 

 10/24/2013 12:32 PM  WBC 11.1 RBC 5.19 HGB 14.80 g/dLHCT 44.0 %MCV 85.0 fLMCH 
28.50 pgMCHC 33.60 g/dLRDW SD 46 RDW CV 14.90 %MPV 10.50 fLPLT 357 NRBC# 0.00 
NRBC% 0.0 %NEUT 63.70 %%LYMP 26.0 %%MONO 8.50 %%EOS 1.50 %%BASO 0.30 %#NEUT 
7.07 #LYMP 2.88 #MONO 0.94 #EOS 0.17 #BASO 0.03 MANUAL DIFF NOT IND TSH 1.140 
uIU/mLGLUCOSE 109.0 mg/dLSODIUM 136.0 mmol/LPOTASSIUM 4.70 mmol/LCHLORIDE 104.0 
mmol/LCO2 22.0 mmol/LBUN 20.0 mg/dLCREATININE 0.90 mg/dLSGOT/AST 14.0 IU/LSGPT/
ALT 13.0 IU/LALK PHOS 90.0 IU/LTOTAL PROTEIN 7.20 g/dLALBUMIN 4.20 g/dLTOTAL 
BILI 0.40 mg/dLCALCIUM 9.70 mg/dLAGE 68 GFR NonAA 62 GFR AA 75 eGFR >60 mL/min/
1.73 m2eGFR AA* >60 

 

 10/6/2014 4:10 PM  HGB A1C 7.20 %Est Avg Glucose 159.9 mg/dLTSH 1.860 uIU/
mLWBC 11.5 RBC 5.21 HGB 14.60 g/dLHCT 43.0 %MCV 83.0 fLMCH 28.0 pgMCHC 34.0 g/
dLRDW SD 46 RDW CV 15.20 %MPV 10.20 fLPLT 311 NRBC# 0.00 NRBC% 0.0 %NEUT 65.30 %
%LYMP 25.20 %%MONO 8.0 %%EOS 1.20 %%BASO 0.30 %#NEUT 7.51 #LYMP 2.89 #MONO 0.92 
#EOS 0.14 #BASO 0.03 MANUAL DIFF SEE BELOW SEGS 69 LYMPHS 20 MONOS 9 EOS 1.0 %
BASO 1.0 %WBC 11.5 RBC 5.21 HGB 14.60 g/dLHCT 43.0 %MCV 83.0 fLMCH 28.0 pgMCHC 
34.0 g/dLRDW SD 46 RDW CV 15.20 %MPV 10.20 fLPLT 311 NRBC# 0.00 NRBC% 0.0 %NEUT 
65.30 %%LYMP 25.20 %%MONO 8.0 %%EOS 1.20 %%BASO 0.30 %#NEUT 7.51 #LYMP 2.89 #
MONO 0.92 #EOS 0.14 #BASO 0.03 MANUAL DIFF PENDING GLUCOSE 97.0 mg/dLSODIUM 
136.0 mmol/LPOTASSIUM 4.30 mmol/LCHLORIDE 103.0 mmol/LCO2 22.0 mmol/LBUN 28.0 mg
/dLCREATININE 0.80 mg/dLSGOT/AST 15.0 IU/LSGPT/ALT 12.0 IU/LALK PHOS 121.0 IU/
LTOTAL PROTEIN 7.60 g/dLALBUMIN 4.10 g/dLTOTAL BILI 0.20 mg/dLCALCIUM 9.10 mg/
dLAGE 69 GFR NonAA 71 GFR AA 86 eGFR 60 eGFR AA* 60 

 

 2014 2:40 PM  WBC 9.1 RBC 5.23 HGB 14.90 g/dLHCT 44.0 %MCV 84.0 fLMCH 
28.50 pgMCHC 33.90 g/dLRDW SD 48 RDW CV 16.0 %MPV 10.30 fLPLT 348 NRBC# 0.00 
NRBC% 0.0 GLUCOSE 68.0 mg/dLSODIUM 136.0 mmol/LPOTASSIUM 4.30 mmol/LCHLORIDE 
102.0 mmol/LCO2 24.0 mmol/LBUN 12.0 mg/dLCREATININE 0.90 mg/dLSGOT/AST 48.0 IU/
LSGPT/.0 IU/LALK PHOS 293.0 IU/LTOTAL PROTEIN 7.30 g/dLALBUMIN 4.20 g/
dLTOTAL BILI 0.40 mg/dLCALCIUM 9.70 mg/dLAGE 69 GFR NonAA 62 GFR AA 75 eGFR 60 
eGFR AA* 60 LIPASE 14.0 U/LAMYLASE 36 IU/L

 

 2014 5:45 AM  GLUCOSE 107.0 mg/dLSODIUM 136.0 mmol/LPOTASSIUM 3.90 mmol/
LCHLORIDE 102.0 mmol/LCO2 23.0 mmol/LBUN 10.0 mg/dLCREATININE 0.70 mg/dLSGOT/
AST 45.0 IU/LSGPT/.0 IU/LALK PHOS 287.0 IU/LTOTAL PROTEIN 6.80 g/
dLALBUMIN 3.60 g/dLTOTAL BILI 0.50 mg/dLCALCIUM 9.50 mg/dLAGE 69 GFR NonAA 83 
GFR  eGFR 60 eGFR AA* 60 WBC 21.0 RBC 5.05 HGB 13.80 g/dLHCT 42.70 %MCV 
85.0 fLMCH 27.30 pgMCHC 32.30 g/dLRDW SD 50 RDW CV 16.50 %MPV 10.40 fLPLT 301 
NRBC# 0.00 NRBC% 0.0 %NEUT 78.40 %%LYMP 13.90 %%MONO 7.70 %%EOS 0.0 %%BASO 0.0 %
#NEUT 16.48 #LYMP 2.93 #MONO 1.62 #EOS 0.00 #BASO 0.01 MANUAL DIFF SEE BELOW 
SEGS 75 BANDS 1 LYMPHS 17 MONOS 7 ATYP LYMPHS 1+ 

 

 2014 6:05 AM  GLUCOSE 99.0 mg/dLSODIUM 137.0 mmol/LPOTASSIUM 3.60 mmol/
LCHLORIDE 102.0 mmol/LCO2 22.0 mmol/LBUN 9.0 mg/dLCREATININE 0.80 mg/dLSGOT/AST 
20.0 IU/LSGPT/ALT 81.0 IU/LALK PHOS 224.0 IU/LTOTAL PROTEIN 6.90 g/dLALBUMIN 
3.60 g/dLTOTAL BILI 0.60 mg/dLCALCIUM 9.50 mg/dLAGE 69 GFR NonAA 71 GFR AA 86 
eGFR 60 eGFR AA* 60 WBC 11.2 RBC 4.99 HGB 13.80 g/dLHCT 41.90 %MCV 84.0 fLMCH 
27.70 pgMCHC 32.90 g/dLRDW SD 50 RDW CV 16.40 %MPV 10.70 fLPLT 307 NRBC# 0.00 
NRBC% 0.0 %NEUT 65.80 %%LYMP 21.60 %%MONO 8.70 %%EOS 3.70 %%BASO 0.20 %#NEUT 
7.34 #LYMP 2.41 #MONO 0.97 #EOS 0.41 #BASO 0.02 MANUAL DIFF NOT IND 

 

 2016 11:05 AM  WBC 10.0 RBC 5.49 HGB 15.50 g/dLHCT 47.60 %MCV 87.0 fLMCH 
28.20 pgMCHC 32.60 g/dLRDW SD 48 RDW CV 15.30 %MPV 9.80 fLPLT 290 NRBC# 0.00 
NRBC% 0.0 %NEUT 58.30 %%LYMP 32.50 %%MONO 7.40 %%EOS 0.80 %%BASO 0.50 %#NEUT 
5.83 #LYMP 3.25 #MONO 0.74 #EOS 0.08 #BASO 0.05 MANUAL DIFF NOT IND HGB A1C 
5.60 %Est Avg Glucose 114.0 mg/dLTSH 2.360 uIU/mLGLUCOSE 96.0 mg/dLSODIUM 137.0 
mmol/LPOTASSIUM 4.70 mmol/LCHLORIDE 102.0 mmol/LCO2 25.0 mmol/LBUN 17.0 mg/
dLCREATININE 1.0 mg/dLSGOT/AST 16.0 IU/LSGPT/ALT 13.0 IU/LALK PHOS 109.0 IU/
LTOTAL PROTEIN 7.30 g/dLALBUMIN 4.60 g/dLTOTAL BILI 0.40 mg/dLCALCIUM 9.70 mg/
dLAGE 71 GFR NonAA 55 GFR AA 67 eGFR 55 eGFR AA* >60 CHOLESTEROL 292.0 mg/dL

 

 10/26/2016 5:39 PM  WBC 11.6 RBC 5.52 HGB 15.50 g/dLHCT 48.40 %MCV 88.0 fLMCH 
28.10 pgMCHC 32.0 g/dLRDW SD 47 RDW CV 14.60 %MPV 9.70 fLPLT 298 NRBC# 0.00 NRBC
% 0.0 %NEUT 53.80 %%LYMP 32.20 %%MONO 8.80 %%EOS 4.10 %%BASO 0.60 %#NEUT 6.22 #
LYMP 3.72 #MONO 1.02 #EOS 0.48 #BASO 0.07 MANUAL DIFF NOT IND GLUCOSE 92.0 mg/
dLSODIUM 138.0 mmol/LPOTASSIUM 4.50 mmol/LCHLORIDE 99.0 mmol/LCO2 26.0 mmol/
LBUN 13.0 mg/dLCREATININE 1.0 mg/dLSGOT/AST 15.0 IU/LSGPT/ALT 15.0 IU/LALK PHOS 
125.0 IU/LTOTAL PROTEIN 7.50 g/dLALBUMIN 4.30 g/dLTOTAL BILI 0.30 mg/dLCALCIUM 
9.60 mg/dLAGE 71 GFR NonAA 55 GFR AA 67 eGFR 55 eGFR AA* >60 







History Of Immunizations







 Name  Date Admin  Mfg Name  Mfg Code  Trade Name  Lot#  Route  Inj  Vis Given  
Vis Pub  CVX

 

 Influenza  10/18/2014  sanofi pasteur  [employer]  Fluzone Quadrivalent  [id number]  
Intramuscular  Left Deltoid  10/18/2014  2014  141







History of Past Illness







 Name  Date of Onset  Comments

 

 Bipolar Disorder      

 

 Gastroesophageal Reflux      

 

 Chronic Obstructive Pulmonary Disease      

 

 Hypothyroidism, Acquired  10/31/2013   

 

 Abdominal Pain, RUQ      

 

 Screening Mammogram  May  9 2012  9:23AM   

 

 Hyperlipidemia, unspecified  2012  8:37AM   

 

 Cough  2012  8:37AM   

 

 Pneumonia  2012  5:57PM   

 

 Upper Respiratory Infection  2012  4:57PM   

 

 Screening Mammogram For High Risk Patient  Aug 30 2012 10:13AM   

 

 Screening Mammogram  Sep 17 2013  8:10AM   

 

 Hypothyroidism, Acquired  Sep 17 2013  1:30PM   

 

 Chronic kidney disease  Sep 17 2013  1:30PM   

 

 Yeast Of Skin  Sep 17 2013  1:30PM   

 

 Hypothyroidism, Acquired  Oct 31 2013 10:07AM   

 

 Hypothyroidism, Acquired  Oct  6 2014  3:25PM   

 

 Hyperglycemia  Oct  6 2014  3:25PM   

 

 Esophageal Reflux  Oct  6 2014  3:25PM   

 

 Diabetes Mellitus, Type II  Oct 13 2014 10:43AM   

 

 Esophageal Reflux  Oct 13 2014 10:43AM   

 

 Bronchitis, Acute  2014  5:55PM   

 

 Upper Respiratory Infections  2014  5:55PM   

 

 Hernia, incisional  Dec 16 2014  5:12PM   

 

 Abdominal pain  Dec 16 2014  5:12PM   

 

 Abdominal Pain  Dec 17 2014  9:49AM   

 

 Abdominal pain, RUQ  Dec 22 2014  2:02PM   

 

 Chronic Cholecystitis  Dec 22 2014  2:02PM   

 

 Incisional Hernia: No Obstruction Or Gangrene  Dec 22 2014  2:02PM   

 

 Postoperative Follow-up: Cholecystectomy  2015  1:49PM   

 

 Postoperative Follow-Up Visit  2015  1:38PM   

 

 Postoperative Follow-Up Visit  2015  1:56PM   

 

 Bronchitis  Mar 24 2015  6:10PM   

 

 Postoperative Follow-Up Visit  2015  3:23PM   

 

 Screening Mammogram  May 11 2015 12:13PM   

 

 Hypothyroidism, Acquired  Sep 14 2016 10:25AM   

 

 Low Back Pain  Sep 14 2016 10:25AM   

 

 Hyperglycemia  Sep 14 2016 10:25AM   

 

 Bronchitis  Sep 29 2016  7:12PM   

 

 Wheezing  Sep 29 2016  7:12PM   

 

 Coughing  Sep 29 2016  7:12PM   

 

 Cough  Oct 26 2016  5:06PM   

 

 Shortness of breath  Oct 26 2016  5:06PM   

 

 Chronic obstructive pulmonary disease, unspecified COPD type  Oct 26 2016  5:
06PM   







Payers







 Insurance Name  Company Name  Plan Name  Plan Number  Policy Number  Policy 
Group Number  Start Date

 

    Medicare Part A  Medicare RHC     [id number]     N/A

 

    Medicare Part B  [place]icare [place]     [id number]     N/A

 

    [facility]  [facility]     [id number]     N/A

 

    [place] [employer] [place] [employer] [place]     
[id number]     N/A

 

    Medicare Part A  Medicare Part A     [id number]     N/A

 

    Medicare Part A  Medicare - Lab/Xray     [id number]     N/A







History of Encounters







 Visit Date  Visit Type  Provider

 

 10/26/2016  Office visit  Saqib Bustillos APRN

 

 2016  Office visit  Saqib Bustillos APRJULIO C

 

 2016  Office visit   

 

 2016  Office visit  Navin Raza MD

 

 3/24/2015  Office visit  Carley BUSH

 

 2015  Office visit  Javier Preston MD

 

 2015  Office visit  Javier Preston MD

 

 2015  Office visit  Javier Preston MD

 

 2015  Office visit  Javier Preston MD

 

 2014  Hospital  Javier Preston MD

 

 2014  Hospital  Javier Preston MD

 

 2014  Office visit   

 

 2014  Office visit  Javier Preston MD

 

 2014  Office visit  Navin Raza MD

 

 2014  Office visit  Rosamaria Maria APRN

 

 2014  Office visit  Sugey Hawkins APRN

 

 10/13/2014  Office visit  Navin Raza MD

 

 10/6/2014  Office visit   

 

 10/6/2014  Office visit  Navin Raza MD

 

 10/31/2013  Office visit  Navin Raza MD

 

 2013  Office visit  Navin Raza MD

 

 2012  Office visit  Navin Raza MD

## 2018-11-04 NOTE — ED GENERAL
General


Chief Complaint:  Allergic Reaction


Stated Complaint:  POST CHEMO PROBLEMS


Nursing Triage Note:  


states that she woke at 1500 with her tongue thick and pins and needles to her 


fingers.  received chemo this week.  no soa


Nursing Sepsis Screen:  No Definite Risk


Source of Information:  Patient


Exam Limitations:  No Limitations





History of Present Illness


Date Seen by Provider:  Nov 4, 2018


Time Seen by Provider:  18:45


Initial Comments


Here with report of waking with her tongue swelling today and about 3 p.m.  

Also felt some pins and needles in her hands.  She is on chemotherapy for lung 

cancer and was concerned that she is having a reaction to that.  She did 

receive Benadryl and she got here and the tongue swelling in the pins and 

needles feeling are improving.  She did take chemotherapy on Monday, Tuesday 

and Wednesday of this week.  We'll also recently started lisinopril and that is 

any medicine for her.  Denies breathing problems or skin rash otherwise.


Timing/Duration:  4-6 Hours


Severity:  Moderate


Modifying Factors:  improves with Medication, improves with Rest


Associated Systoms:  Cough; No Fever/Chills, No Nausea/Vomiting, No Shortness 

of Air, No Weakness





Allergies and Home Medications


Allergies


Coded Allergies:  


     No Known Drug Allergies (Unverified , 11/4/18)





Home Medications


Amlodipine Besylate 5 Mg Tablet, 5 MG PO DAILY


   Prescribed by: MP SANCHES on 11/4/18 1941





Patient Home Medication List


Home Medication List Reviewed:  Yes





Review of Systems


Review of Systems


Constitutional:  see HPI; No chills; fever (reported that she had temperature 

of 99 1 the other day but that has subsequently resolved)


EENTM:  see HPI, mouth swelling, nose congestion


Respiratory:  cough, phlegm


Cardiovascular:  No chest pain, No edema


Gastrointestinal:  No abdominal pain, No nausea, No vomiting


Genitourinary:  no symptoms reported


Musculoskeletal:  no symptoms reported


Skin:  no symptoms reported


Psychiatric/Neurological:  See HPI, Tingling; Denies Weakness


Hematologic/Lymphatic:  No Symptoms Reported





All Other Systems Reviewed


Negative Unless Noted:  Yes





Past Medical-Social-Family Hx


Past Med/Social Hx:  Reviewed Nursing Past Med/Soc Hx


Patient Social History


Alcohol Use:  Denies Use


Recreational Drug Use:  No


Smoking Status:  Former Smoker


2nd Hand Smoke Exposure:  No


Recent Foreign Travel:  No


Contact w/Someone Who Travel:  No


Recent Infectious Disease Expo:  No


Recent Hopitalizations:  Yes


Physical Abuse:  No


Sexual Abuse:  No


Mistreated:  No


Fear:  No





Seasonal Allergies


Seasonal Allergies:  No





Past Medical History


Surgeries:  Yes (spleenectomy)


Abdominal


Respiratory:  No


Cardiac:  Yes


Hypertension


Neurological:  No


Genitourinary:  No


Gastrointestinal:  No


Musculoskeletal:  No


Endocrine:  No


HEENT:  No


Cancer:  Yes


Lung


Psychosocial:  No


Integumentary:  No





Family Medical History


Reviewed Nursing Family Hx


No Pertinent Family Hx





Physical Exam


Vital Signs





Vital Signs - First Documented








 11/4/18





 17:55


 


Temp 98.3


 


Pulse 89


 


Resp 16


 


B/P (MAP) 150/72 (98)


 


Pulse Ox 97





Capillary Refill : Less Than 3 Seconds


Height, Weight, BMI


Height: 5'5.00"


Weight: 154lbs. oz. [id number];  BMI


Method:Stated


General Appearance:  No Apparent Distress, WD/WN


HEENT:  PERRL/EOMI, Other (noted some swelling of the time but patient and 

family member state that it is actually much decreased since the first arrived 

and was given Benadryl.)


Neck:  Non Tender, Supple


Respiratory:  Lungs Clear, Normal Breath Sounds


Cardiovascular:  Regular Rate, Rhythm, No Murmur


Gastrointestinal:  Non Tender, Soft


Neurologic/Psychiatric:  Alert, Oriented x3


Skin:  Normal Color, Warm/Dry





Progress/Results/Core Measures


Suspected Sepsis


Recent Fever Within 48 Hours:  No


Infection Criteria Present:  None


New/Unexplained  Altered Menta:  No


Sepsis Screen:  No Definite Risk


SIRS


Temperature:98.3 


Pulse: 89 


Respiratory Rate: 16


 


Laboratory Tests


11/4/18 19:09: White Blood Count 8.1


Blood Pressure 150 /72 


Mean: 98


 





Laboratory Tests


11/4/18 19:09: 


Creatinine 1.06, Platelet Count 342, Total Bilirubin 0.4








Results/Orders


Lab Results





Laboratory Tests








Test


 11/4/18


19:09 Range/Units


 


 


White Blood Count


 8.1 


 4.3-11.0


10^3/uL


 


Red Blood Count


 3.91 L


 4.35-5.85


10^6/uL


 


Hemoglobin 10.7 L 11.5-16.0  G/DL


 


Hematocrit 33 L 35-52  %


 


Mean Corpuscular Volume 83  80-99  FL


 


Mean Corpuscular Hemoglobin 27  25-34  PG


 


Mean Corpuscular Hemoglobin


Concent 33 


 32-36  G/DL





 


Red Cell Distribution Width 14.9 H 10.0-14.5  %


 


Platelet Count


 342 


 130-400


10^3/uL


 


Mean Platelet Volume 9.5  7.4-10.4  FL


 


Neutrophils (%) (Auto) 82 H 42-75  %


 


Lymphocytes (%) (Auto) 17  12-44  %


 


Monocytes (%) (Auto) 1  0-12  %


 


Eosinophils (%) (Auto) 0  0-10  %


 


Basophils (%) (Auto) 0  0-10  %


 


Neutrophils # (Auto) 6.6  1.8-7.8  X 10^3


 


Lymphocytes # (Auto) 1.4  1.0-4.0  X 10^3


 


Monocytes # (Auto) 0.0  0.0-1.0  X 10^3


 


Eosinophils # (Auto)


 0.0 


 0.0-0.3


10^3/uL


 


Basophils # (Auto)


 0.0 


 0.0-0.1


10^3/uL


 


Sodium Level 130 L 135-145  MMOL/L


 


Potassium Level 4.7  3.6-5.0  MMOL/L


 


Chloride Level 98    MMOL/L


 


Carbon Dioxide Level 21  21-32  MMOL/L


 


Anion Gap 11  5-14  MMOL/L


 


Blood Urea Nitrogen 15  7-18  MG/DL


 


Creatinine


 1.06 


 0.60-1.30


MG/DL


 


Estimat Glomerular Filtration


Rate 51 


  





 


BUN/Creatinine Ratio 14   


 


Glucose Level 113 H   MG/DL


 


Calcium Level 9.4  8.5-10.1  MG/DL


 


Corrected Calcium 9.6  8.5-10.1  MG/DL


 


Total Bilirubin 0.4  0.1-1.0  MG/DL


 


Aspartate Amino Transf


(AST/SGOT) 19 


 5-34  U/L





 


Alanine Aminotransferase


(ALT/SGPT) 17 


 0-55  U/L





 


Alkaline Phosphatase 533 H   U/L


 


Total Protein 7.4  6.4-8.2  GM/DL


 


Albumin 3.7  3.2-4.5  GM/DL








My Orders





Orders - MP SANCHES MD


Diphenhydramine Injection (Benadryl Inje (11/4/18 18:08)


Cbc With Automated Diff (11/4/18 18:55)


Comprehensive Metabolic Panel (11/4/18 18:55)


Amlodipine Tablet (Norvasc Tablet) (11/4/18 19:00)


Dexamethasone Injection (Decadron Inject (11/4/18 19:00)





Medications Given in ED





Current Medications








 Medications  Dose


 Ordered  Sig/Juanita


 Route  Start Time


 Stop Time Status Last Admin


Dose Admin


 


 Amlodipine


 Besylate  5 mg  ONCE  ONCE


 PO  11/4/18 19:00


 11/4/18 19:01 DC 11/4/18 19:12


5 MG


 


 Dexamethasone


 Sodium Phosphate  10 mg  ONCE  ONCE


 IV  11/4/18 19:00


 11/4/18 19:01 DC 11/4/18 19:12


10 MG


 


 Diphenhydramine


 HCl  25 mg  ONCE  ONCE


 IV  11/4/18 18:15


 11/4/18 18:16 DC 11/4/18 18:13


25 MG








Vital Signs/I&O











 11/4/18 11/4/18





 17:55 19:41


 


Temp 98.3 


 


Pulse 89 


 


Resp 16 


 


B/P (MAP) 150/72 (98) 119/62 (81)


 


Pulse Ox 97 





Capillary Refill : Less Than 3 Seconds








Blood Pressure Mean:  98








Progress Note :  


Progress Note


Seen and evaluated.  IV and labs ordered.  Decadron 10 mg IV after she was 

given Benadryl 25 mg IV.  This seems to have helped significantly.  We will 

check basic labs given her history of cancer.  She is supposed to have this 

checked tomorrow anyway so we will send a copy of the chart over to her cancer 

doctor, Dr. Sherwood.  I am concerned about the lisinopril as a new medicine and 

the tongue swelling.  We will stop that and I will initiate amlodipine and 

right short prescription for that so that she can have that rechecked and 

potentially represcribed as needed.  Monitor patient.  2003: Blood pressure 119/

62 and has tolerated amlodipine very well.  Tongue situation is resolved.  We 

will hold lisinopril and continue amlodipine.  Discharged home with return 

precautions.  Patient verbalize understanding instructions and agreement with 

plan.





Departure


Impression





 Primary Impression:  


 Angioedema


 Qualified Codes:  T78.3XXA - Angioneurotic edema, initial encounter


Disposition:  01 HOME, SELF-CARE


Condition:  Improved





Departure-Patient Inst.


Decision time for Depature:  19:40


Referrals:  


SHIRA BRAR MD (PCP)


Primary Care Physician


Patient Instructions:  Angioedema (DC)





Add. Discharge Instructions:  


All discharge instructions reviewed with patient and/or family. Voiced 

understanding.





Stop lisinopril.  You'll start the new medicine as prescribed.  Follow-up with 

your doctor this week for recheck and further evaluation and to discuss 

continuation of the blood pressure medicine.  Call the cancer center in the 

morning and let him know that you were seen here today as well.  Return for 

worse pain, fever, vomiting, weakness, breathing problems or other concerns as 

needed.


Scripts


Amlodipine Besylate (Amlodipine Besylate) 5 Mg Tablet


5 MG PO DAILY, #30 TAB 0 Refills


   Prov: MP SANCHES MD         11/4/18





Copy


Copies To 1:   FILEMON SHERWOOD MD, TIMOTHY D MD Nov 4, 2018 18:58

## 2018-11-04 NOTE — XMS REPORT
MU2 Ambulatory Summary

 Created on: 2018



Reina Griggs

External Reference #: [id number]

: 1945

Sex: Female



Demographics







 Address  [address]

[place]  [address]

 

 Home Phone  [phone]

 

 Preferred Language  English

 

 Marital Status  

 

 Islam Affiliation  Unknown

 

 Race  White

 

 Ethnic Group  Not  or 





Author







 Author  Navin Raza

 

 [facility]

 

 Address  [address]

[place], [place]  [id number]



 

 Phone  [phone]







Care Team Providers







 Care Team Member Name  Role  Phone

 

 Navin Raza  PCP  [phone]

 

 Navin Raza  PreferredProvider  [phone]







Allergies and Adverse Reactions







 Name  Reaction  Notes

 

 PENICILLINS      







Plan of Treatment







 Planned Activity  Comments  Planned Date  Planned Time  Plan/Goal

 

 CBC With Auto Differential     2012  12:00 AM   

 

 Chest X-ray, PA and lateral     2012  12:00 AM   

 

 Diagnostic Mammogram     2012  12:00 AM   

 

 Screening mammography, bilateral     2015  12:00 AM   







Medications







 Active 

 

 Name  Start Date  Estimated Completion Date  SIG  Comments

 

 nystatin-triamcinolone 100,000-0.1 unit/g-% topical cream  2013     apply 
to the affected area(s) by topical route 2 times per day in the morning and 
evening   

 

 imipramine HCl 50 mg oral tablet  2014     TAKE 2 TABLETS BY MOUTH EVERY 
MORNING AND 3 TABLETS BY MOUTH EVERY NIGHT AT BEDTIME   

 

 Vesicare 5 mg oral tablet  2014     TAKE 1 TABLET BY MOUTH DAILY   

 

 tramadol 50 mg oral tablet  2014     TAKE 1 TABLET BY MOUTH EVERY 6 HOURS 
AS NEEDED   

 

 imipramine HCl 50 mg oral tablet  2014     TAKE 2 TABLETS BY MOUTH EVERY 
MORNING AND 3 TABLETS BY MOUTH EVERY NIGHT AT BEDTIME   

 

 imipramine HCl 50 mg oral tablet  10/20/2014     TAKE 2 TABLETS BY MOUTH EVERY 
MORNING AND 3 TABLETS BY MOUTH EVERY NIGHT AT BEDTIME   

 

 tramadol 50 mg oral tablet  10/23/2014     TAKE 1 TABLET BY MOUTH EVERY 6 
HOURS AS NEEDED   

 

 Vesicare 5 mg oral tablet  10/23/2014     TAKE 1 TABLET BY MOUTH DAILY   

 

 tramadol 50 mg oral tablet  10/23/2014     TAKE 1 TABLET BY MOUTH EVERY 6 
HOURS AS NEEDED   

 

 Vesicare 5 mg oral tablet  10/23/2014     TAKE 1 TABLET BY MOUTH DAILY   

 

 metoclopramide HCl 10 mg oral tablet  2014     TAKE 1 TABLET BY MOUTH 
FOUR TIMES DAILY 30 MINUTES BEFORE MEALS AND AT BEDTIME   

 

 Abdominal Belt 1 abdominal binder  2015     Use PRN for incisional support
   

 

 imipramine HCl 50 mg oral tablet  2015     TAKE 2 TABLETS BY MOUTH EVERY 
MORNING AND 3 TABLETS BY MOUTH EVERY NIGHT AT BEDTIME   

 

 Vesicare 5 mg oral tablet  3/10/2015     TAKE 1 TABLET BY MOUTH DAILY   

 

 tramadol 50 mg oral tablet  2015     TAKE 1 TABLET BY MOUTH EVERY 6 HOURS 
AS NEEDED   

 

 tramadol 50 mg oral tablet  2015     TAKE 1 TABLET BY MOUTH EVERY 6 HOURS 
AS NEEDED   

 

 imipramine HCl 50 mg oral tablet  2015     TAKE 2 TABLETS BY MOUTH EVERY 
MORNING AND 3 TABLETS BY MOUTH EVERY NIGHT AT BEDTIME   

 

 metoclopramide HCl 10 mg oral tablet  2015     TAKE 1 TABLET BY MOUTH 
FOUR TIMES DAILY 30 MINUTES BEFORE MEALS AND AT BEDTIME   

 

 levothyroxine 50 mcg oral tablet  2015     TAKE 1 TABLET BY MOUTH ONCE 
DAILY   

 

 imipramine HCl 50 mg oral tablet  2015     TAKE 2 TABLETS BY MOUTH EVERY 
MORNING AND 3 TABLETS BY MOUTH EVERY NIGHT AT BEDTIME   

 

 Vesicare 5 mg oral tablet  2016     TAKE 1 TABLET BY MOUTH DAILY   

 

 metoclopramide HCl 10 mg oral tablet  2016     TAKE 1 TABLET BY MOUTH 
FOUR TIMES DAILY 30 MINUTES BEFORE MEALS AND AT BEDTIME   

 

 imipramine HCl 50 mg oral tablet  2016     TAKE 2 TABLETS BY MOUTH EVERY 
MORNING AND 3 TABLETS BY MOUTH EVERY NIGHT AT BEDTIME   

 

 imipramine HCl 50 mg oral tablet  2016     TAKE 2 TABLETS BY MOUTH EVERY 
MORNING AND 3 TABLETS BY MOUTH EVERY NIGHT AT BEDTIME   

 

 ProAir HFA 90 mcg/actuation inhalation HFA aerosol inhaler  2016     
inhale 1 - 2 puffs (90 - 180 mcg) by inhalation route every 6 hours as needed   

 

 ProAir HFA 90 mcg/actuation inhalation HFA aerosol inhaler  10/31/2016     
INHALE 1 TO 2 PUFFS BY MOUTH EVERY 6 HOURS AS NEEDED   

 

 Nebulizer and accessories  10/31/2016     Use as directed DX: COPD RAFAEL: 99   

 

 albuterol sulfate 2.5 mg /3 mL (0.083 %) inhalation solution for nebulization  
10/31/2016     inhale 3 milliliters (2.5 mg) by nebulization route every 8 
hours as needed   

 

 ProAir HFA 90 mcg/actuation inhalation HFA aerosol inhaler  10/31/2016     
INHALE 1 - 2 PUFFS (90 - 180 MCG) BY INHALATION ROUTE EVERY 6 HOURS AS NEEDED   

 

 Vesicare 5 mg oral tablet  2017     TAKE 1 TABLET BY MOUTH DAILY   

 

 imipramine HCl 50 mg oral tablet  2017     TAKE 2 TABLETS BY MOUTH EVERY 
MORNING AND 3 TABLETS BY MOUTH EVERY NIGHT AT BEDTIME   

 

 imipramine HCl 50 mg oral tablet  2017     TAKE 2 TABLETS BY MOUTH EVERY 
MORNING AND 3 EVERY NIGHT AT BEDTIME   

 

 metoclopramide HCl 10 mg oral tablet  2017  TAKE 1 TABLET BY 
MOUTH FOUR TIMES DAILY 30 MINUTES BEFORE MEALS AND AT BEDTIME FOR 30 DAYS   

 

 levothyroxine 50 mcg oral tablet  2017  TAKE 1 TABLET BY 
MOUTH EVERY DAY. NEED APPOINTMENT FOR FURTHER REFILLS   

 

 Zetia 10 mg oral tablet  2018  1/3/2019  take 1 tablet (10 mg) by oral 
route once daily for 30 days   

 

 tramadol 50 mg oral tablet  2018     take 1 tablet by oral route every 4 
hours as needed   









  

 

 Name  Start Date  Expiration Date  SIG  Comments

 

 tramadol 50 mg oral tablet  2013  1/15/2014  take 1 tablet (50 mg) by 
oral route every 6 hours as needed for 30 days   

 

 Reglan 10 mg oral tablet  2014  take 1 tablet (10 mg) by oral 
route 4 times per day 30 minutes before meals and at bedtime   

 

 omeprazole 40 mg oral capsule,delayed release(DR/EC)  10/13/2014  10/8/2015  
take 1 capsule (40 mg) by oral route once daily before a meal for 30 days   

 

 Synthroid 50 mcg oral tablet  10/23/2014  10/18/2015  take 1 tablet (50 mcg) 
by oral route once daily for 90 days   

 

 Zithromax Z-Paul 250 mg oral tablet  2014  Take 2 tablets the 
first day (500 mg) followed by 1 tablet (250 mg) days 2-5. for 5 days   

 

 Medrol (Paul) 4 mg oral tablets,dose pack  2014     take as directed   

 

 azithromycin 250 mg oral tablet  3/24/2015  3/29/2015  take 2 tablets (500 mg) 
by oral route once daily for 1 day then 1 tablet (250 mg) by oral route once 
daily for 4 days   

 

 Bactrim -160 mg oral tablet  2016  10/6/2016  take 1 tablet by oral 
route 2 times per day for 7 days   









 Discontinued 

 

 Name  Start Date  Discontinued Date  SIG  Comments

 

 nystatin-triamcinolone 100,000-0.1 unit/g-% topical cream  2014
  APPLY TO THE AFFECTED AREA TWICE DAILY , IN MORNING AND EVENING   

 

 Synthroid 50 mcg oral tablet  10/23/2014  2016  TAKE 1 TABLET (50 MCG) BY 
ORAL ROUTE ONCE DAILY FOR 90 DAYS   

 

 Reglan 10 mg oral tablet  2014  TAKE 1 TABLET (10 MG) BY 
ORAL ROUTE 4 TIMES PER DAY 30 MINUTES BEFORE MEALS AND AT BEDTIME   

 

 oxycodone 15 mg oral tablet  2014  take 1 tablet (15 mg) by 
oral route every 6 hours   

 

 Medrol (Paul) 4 mg oral tablets,dose pack  3/24/2015  2016  take as 
directed   







Problem List







 Description  Status  Onset

 

 Bipolar Disorder  Active   

 

 Chronic Obstructive Pulmonary Disease  Active   

 

 Gastroesophageal Reflux  Active   

 

 Hypothyroidism, Acquired  Active  10/31/2013

 

 Abdominal Pain, RUQ  Active   







Vital Signs







 Date  Time  BP-Sys(mm[Hg]  BP-Sabrina(mm[Hg])  HR(bpm)  RR(rpm)  Temp  WT  HT  HC  
BMI  BSA  BMI Percentile  O2 Sat(%)

 

 2018  9:00:00 AM  148 mmHg  80 mmHg  78 bpm  16 rpm  96.2 F  168 lbs  66.5 
in     26.71 kg/m2  1.89 m2     99 %

 

 10/26/2016  5:04:00 PM  132 mmHg  74 mmHg  84 bpm  18 rpm  97.8 F  176.125 lbs
                 95 %

 

 2016  7:10:00 PM  124 mmHg  68 mmHg  94 bpm  20 rpm  99 F  169 lbs  66.5 
in     26.87 kg/m2  1.90 m2     95 %

 

 2016  10:18:00 AM  148 mmHg  80 mmHg  82 bpm  18 rpm  97.8 F  169 lbs  
66.5 in     26.8684 kg/m  1.8965 m     97 %

 

 3/24/2015  6:08:00 PM  120 mmHg  78 mmHg  89 bpm  20 rpm  98.3 F  160 lbs     
            96 %

 

 2015  1:55:00 PM  154 mmHg  80 mmHg  88 bpm  20 rpm  97.3 F     66.5 in  
             

 

 2015  1:36:00 PM  166 mmHg  82 mmHg  94 bpm  24 rpm  96.2 F  162 lbs  66.5 
in     25.76 kg/m2  1.86 m2     98 %

 

 2015  3:17:00 PM  140 mmHg  76 mmHg  95 bpm  20 rpm  96 F                 
   99 %

 

 2015  1:48:00 PM  160 mmHg  78 mmHg  87 bpm  24 rpm  96.5 F               
     94 %

 

 2014  1:41:00 PM  156 mmHg  84 mmHg  94 bpm  20 rpm  96.3 F  166 lbs  
66.5 in     26.3914 kg/m  1.8796 m     97 %

 

 2014  9:43:00 AM  142 mmHg  64 mmHg  100 bpm  18 rpm  97.2 F  166 lbs  
66.5 in     26.3914 kg/m  1.88 m2      

 

 2014  5:05:00 PM  150 mmHg  62 mmHg  99 bpm  20 rpm  97.6 F  170 lbs  
66.5 in     27.03 kg/m2  1.9021 m     97 %

 

 2014  5:54:00 PM  145 mmHg  90 mmHg  93 bpm  18 rpm  97.5 F  170 lbs  
66.5 in     27.0274 kg/m  1.90 m2     96 %

 

 10/6/2014  3:21:00 PM  140 mmHg  82 mmHg  82 bpm  18 rpm  97.6 F  170 lbs  
66.5 in     27.03 kg/m2  1.9021 m      

 

 10/31/2013  10:02:00 AM  118 mmHg  64 mmHg  78 bpm  18 rpm  98 F  161 lbs  
66.5 in     25.5965 kg/m  1.85 m2      

 

 2013  1:28:00 PM  150 mmHg  80 mmHg  84 bpm  16 rpm  98.1 F  161 lbs  
66.5 in     25.60 kg/m2  1.8511 m     97 %

 

 2012  8:27:00 AM  136 mmHg  68 mmHg  72 bpm  18 rpm  97.6 F  157 lbs  
66.5 in     24.9606 kg/m  1.83 m2      







Social History







 Name  Description  Comments

 

 Tobacco  Current every day smoker  10/26/2016 -  







History of Procedures







 Date Ordered  Description  Order Status

 

 2016 12:00 AM  COMPLETE CBC W/AUTO DIFF WBC  Reviewed

 

 2016 12:00 AM  COMPREHEN METABOLIC PANEL  Reviewed

 

 2016 12:00 AM  LIPID PANEL  Reviewed

 

 2016 12:00 AM  GLYCOSYLATED HEMOGLOBIN TEST  Reviewed

 

 2016 12:00 AM  ASSAY THYROID STIM HORMONE  Reviewed

 

 2016 12:00 AM  AIRWAY INHALATION TREATMENT  Reviewed

 

 10/26/2016 12:00 AM  COMPLETE CBC W/AUTO DIFF WBC  Reviewed

 

 10/26/2016 12:00 AM  COMPREHEN METABOLIC PANEL  Reviewed

 

 10/26/2016 12:00 AM  CHEST X-RAY 2VW FRONTAL&LATL  Reviewed

 

 2012 12:00 AM  COMPUTER DX MAMMOGRAM ADD-ON  Reviewed

 

 2012 12:00 AM  COMPREHEN METABOLIC PANEL  Reviewed

 

 2012 12:00 AM  LIPID PANEL  Reviewed

 

 2012 12:00 AM  CHEST X-RAY 2VW FRONTAL&LATL  Reviewed

 

 2012 12:00 AM  CHEST X-RAY 2VW FRONTAL&LATL  Reviewed

 

 2018 12:00 AM  MAMMOGRAM BOTH BREASTS  Returned

 

 2013 12:00 AM  MAMMOGRAM SCREENING  Reviewed

 

 2013 12:00 AM  COMPLETE CBC W/AUTO DIFF WBC  Reviewed

 

 2013 12:00 AM  COMPREHEN METABOLIC PANEL  Reviewed

 

 2013 12:00 AM  ASSAY THYROID STIM HORMONE  Reviewed

 

 10/6/2014 12:00 AM  COMPLETE CBC W/AUTO DIFF WBC  Reviewed

 

 10/6/2014 12:00 AM  COMPREHEN METABOLIC PANEL  Reviewed

 

 10/6/2014 12:00 AM  GLYCOSYLATED HEMOGLOBIN TEST  Reviewed

 

 10/6/2014 12:00 AM  ASSAY THYROID STIM HORMONE  Reviewed

 

 10/6/2014 12:00 AM  INFLUENZA VIRUS VACCINE SPLIT VIRUS 3/> YRS IM  Reviewed

 

 2014 12:00 AM  CT ABDOMEN W/O & W/DYE  Reviewed

 

 2014 12:00 AM  CT PELVIS W/O & W/DYE  Reviewed

 

 2014 12:00 AM  CT ABD & PELV 1/> REGNS  Reviewed

 

 2014 12:00 AM  ECHO EXAM OF ABDOMEN  Reviewed

 

 2014 12:00 AM  COMPLETE CBC AUTOMATED  Reviewed

 

 2014 12:00 AM  COMPREHEN METABOLIC PANEL  Reviewed

 

 2014 12:00 AM  ASSAY OF AMYLASE  Reviewed

 

 2014 12:00 AM  ASSAY OF LIPASE  Reviewed

 

 2014 12:00 AM  COMPLETE CBC W/AUTO DIFF WBC  Reviewed







Results Summary







 Date and Description  Results

 

 2012 11:18 AM  GLUCOSE 97.0 mg/dLSODIUM 136.0 mmol/LPOTASSIUM 4.20 mmol/
LCHLORIDE 102.0 mmol/LCO2 22.0 mmol/LBUN 7.0 mg/dLCREATININE 1.0 mg/dLSGOT/AST 
12.0 IU/LSGPT/ALT 6.0 IU/LALK PHOS 128.0 IU/LTOTAL PROTEIN 7.40 g/dLALBUMIN 
4.20 g/dLTOTAL BILI 0.40 mg/dLCALCIUM 9.30 mg/dLAGE 66 GFR NonAA 55 GFR AA 67 
eGFR 55 eGFR AA* 60 TRIGLYCERIDES 299.0 mg/dLCHOLESTEROL 293.0 mg/dLHDL 33.0 mg/
dLTOT CHOL/HDL 8.9 LDL (CALC) 200.0 mg/dL

 

 10/24/2013 12:32 PM  WBC 11.1 RBC 5.19 HGB 14.80 g/dLHCT 44.0 %MCV 85.0 fLMCH 
28.50 pgMCHC 33.60 g/dLRDW SD 46 RDW CV 14.90 %MPV 10.50 fLPLT 357 NRBC# 0.00 
NRBC% 0.0 %NEUT 63.70 %%LYMP 26.0 %%MONO 8.50 %%EOS 1.50 %%BASO 0.30 %#NEUT 
7.07 #LYMP 2.88 #MONO 0.94 #EOS 0.17 #BASO 0.03 MANUAL DIFF NOT IND TSH 1.140 
uIU/mLGLUCOSE 109.0 mg/dLSODIUM 136.0 mmol/LPOTASSIUM 4.70 mmol/LCHLORIDE 104.0 
mmol/LCO2 22.0 mmol/LBUN 20.0 mg/dLCREATININE 0.90 mg/dLSGOT/AST 14.0 IU/LSGPT/
ALT 13.0 IU/LALK PHOS 90.0 IU/LTOTAL PROTEIN 7.20 g/dLALBUMIN 4.20 g/dLTOTAL 
BILI 0.40 mg/dLCALCIUM 9.70 mg/dLAGE 68 GFR NonAA 62 GFR AA 75 eGFR >60 mL/min/
1.73 m2eGFR AA* >60 

 

 10/6/2014 4:10 PM  HGB A1C 7.20 %Est Avg Glucose 159.9 mg/dLTSH 1.860 uIU/
mLWBC 11.5 RBC 5.21 HGB 14.60 g/dLHCT 43.0 %MCV 83.0 fLMCH 28.0 pgMCHC 34.0 g/
dLRDW SD 46 RDW CV 15.20 %MPV 10.20 fLPLT 311 NRBC# 0.00 NRBC% 0.0 %NEUT 65.30 %
%LYMP 25.20 %%MONO 8.0 %%EOS 1.20 %%BASO 0.30 %#NEUT 7.51 #LYMP 2.89 #MONO 0.92 
#EOS 0.14 #BASO 0.03 MANUAL DIFF SEE BELOW SEGS 69 LYMPHS 20 MONOS 9 EOS 1.0 %
BASO 1.0 %WBC 11.5 RBC 5.21 HGB 14.60 g/dLHCT 43.0 %MCV 83.0 fLMCH 28.0 pgMCHC 
34.0 g/dLRDW SD 46 RDW CV 15.20 %MPV 10.20 fLPLT 311 NRBC# 0.00 NRBC% 0.0 %NEUT 
65.30 %%LYMP 25.20 %%MONO 8.0 %%EOS 1.20 %%BASO 0.30 %#NEUT 7.51 #LYMP 2.89 #
MONO 0.92 #EOS 0.14 #BASO 0.03 MANUAL DIFF PENDING GLUCOSE 97.0 mg/dLSODIUM 
136.0 mmol/LPOTASSIUM 4.30 mmol/LCHLORIDE 103.0 mmol/LCO2 22.0 mmol/LBUN 28.0 mg
/dLCREATININE 0.80 mg/dLSGOT/AST 15.0 IU/LSGPT/ALT 12.0 IU/LALK PHOS 121.0 IU/
LTOTAL PROTEIN 7.60 g/dLALBUMIN 4.10 g/dLTOTAL BILI 0.20 mg/dLCALCIUM 9.10 mg/
dLAGE 69 GFR NonAA 71 GFR AA 86 eGFR 60 eGFR AA* 60 

 

 2014 2:40 PM  WBC 9.1 RBC 5.23 HGB 14.90 g/dLHCT 44.0 %MCV 84.0 fLMCH 
28.50 pgMCHC 33.90 g/dLRDW SD 48 RDW CV 16.0 %MPV 10.30 fLPLT 348 NRBC# 0.00 
NRBC% 0.0 GLUCOSE 68.0 mg/dLSODIUM 136.0 mmol/LPOTASSIUM 4.30 mmol/LCHLORIDE 
102.0 mmol/LCO2 24.0 mmol/LBUN 12.0 mg/dLCREATININE 0.90 mg/dLSGOT/AST 48.0 IU/
LSGPT/.0 IU/LALK PHOS 293.0 IU/LTOTAL PROTEIN 7.30 g/dLALBUMIN 4.20 g/
dLTOTAL BILI 0.40 mg/dLCALCIUM 9.70 mg/dLAGE 69 GFR NonAA 62 GFR AA 75 eGFR 60 
eGFR AA* 60 LIPASE 14.0 U/LAMYLASE 36 IU/L

 

 2014 5:45 AM  GLUCOSE 107.0 mg/dLSODIUM 136.0 mmol/LPOTASSIUM 3.90 mmol/
LCHLORIDE 102.0 mmol/LCO2 23.0 mmol/LBUN 10.0 mg/dLCREATININE 0.70 mg/dLSGOT/
AST 45.0 IU/LSGPT/.0 IU/LALK PHOS 287.0 IU/LTOTAL PROTEIN 6.80 g/
dLALBUMIN 3.60 g/dLTOTAL BILI 0.50 mg/dLCALCIUM 9.50 mg/dLAGE 69 GFR NonAA 83 
GFR  eGFR 60 eGFR AA* 60 WBC 21.0 RBC 5.05 HGB 13.80 g/dLHCT 42.70 %MCV 
85.0 fLMCH 27.30 pgMCHC 32.30 g/dLRDW SD 50 RDW CV 16.50 %MPV 10.40 fLPLT 301 
NRBC# 0.00 NRBC% 0.0 %NEUT 78.40 %%LYMP 13.90 %%MONO 7.70 %%EOS 0.0 %%BASO 0.0 %
#NEUT 16.48 #LYMP 2.93 #MONO 1.62 #EOS 0.00 #BASO 0.01 MANUAL DIFF SEE BELOW 
SEGS 75 BANDS 1 LYMPHS 17 MONOS 7 ATYP LYMPHS 1+ 

 

 2016 11:05 AM  WBC 10.0 RBC 5.49 HGB 15.50 g/dLHCT 47.60 %MCV 87.0 fLMCH 
28.20 pgMCHC 32.60 g/dLRDW SD 48 RDW CV 15.30 %MPV 9.80 fLPLT 290 NRBC# 0.00 
NRBC% 0.0 %NEUT 58.30 %%LYMP 32.50 %%MONO 7.40 %%EOS 0.80 %%BASO 0.50 %#NEUT 
5.83 #LYMP 3.25 #MONO 0.74 #EOS 0.08 #BASO 0.05 MANUAL DIFF NOT IND HGB A1C 
5.60 %Est Avg Glucose 114.0 mg/dLTSH 2.360 uIU/mLGLUCOSE 96.0 mg/dLSODIUM 137.0 
mmol/LPOTASSIUM 4.70 mmol/LCHLORIDE 102.0 mmol/LCO2 25.0 mmol/LBUN 17.0 mg/
dLCREATININE 1.0 mg/dLSGOT/AST 16.0 IU/LSGPT/ALT 13.0 IU/LALK PHOS 109.0 IU/
LTOTAL PROTEIN 7.30 g/dLALBUMIN 4.60 g/dLTOTAL BILI 0.40 mg/dLCALCIUM 9.70 mg/
dLAGE 71 GFR NonAA 55 GFR AA 67 eGFR 55 eGFR AA* >60 CHOLESTEROL 292.0 mg/dL

 

 10/26/2016 5:39 PM  WBC 11.6 RBC 5.52 HGB 15.50 g/dLHCT 48.40 %MCV 88.0 fLMCH 
28.10 pgMCHC 32.0 g/dLRDW SD 47 RDW CV 14.60 %MPV 9.70 fLPLT 298 NRBC# 0.00 NRBC
% 0.0 %NEUT 53.80 %%LYMP 32.20 %%MONO 8.80 %%EOS 4.10 %%BASO 0.60 %#NEUT 6.22 #
LYMP 3.72 #MONO 1.02 #EOS 0.48 #BASO 0.07 MANUAL DIFF NOT IND GLUCOSE 92.0 mg/
dLSODIUM 138.0 mmol/LPOTASSIUM 4.50 mmol/LCHLORIDE 99.0 mmol/LCO2 26.0 mmol/
LBUN 13.0 mg/dLCREATININE 1.0 mg/dLSGOT/AST 15.0 IU/LSGPT/ALT 15.0 IU/LALK PHOS 
125.0 IU/LTOTAL PROTEIN 7.50 g/dLALBUMIN 4.30 g/dLTOTAL BILI 0.30 mg/dLCALCIUM 
9.60 mg/dLAGE 71 GFR NonAA 55 GFR AA 67 eGFR 55 eGFR AA* >60 







History Of Immunizations







 Name  Date Admin  Mfg Name  Mfg Code  Trade Name  Lot#  Route  Inj  Vis Given  
Vis Pub  CVX

 

 Influenza  10/18/2014  sanofi pasteur  [employer]  Fluzone Quadrivalent  [id number]  
Intramuscular  Left Deltoid  10/18/2014  2014  141







History of Past Illness







 Name  Date of Onset  Comments

 

 Bipolar Disorder      

 

 Gastroesophageal Reflux      

 

 Chronic Obstructive Pulmonary Disease      

 

 Hypothyroidism, Acquired  10/31/2013   

 

 Abdominal Pain, RUQ      

 

 Screening Mammogram  May  9 2012  9:23AM   

 

 Hyperlipidemia, unspecified  2012  8:37AM   

 

 Cough  2012  8:37AM   

 

 Pneumonia  2012  5:57PM   

 

 Upper Respiratory Infection  2012  4:57PM   

 

 Screening Mammogram For High Risk Patient  Aug 30 2012 10:13AM   

 

 Screening Mammogram  Sep 17 2013  8:10AM   

 

 Hypothyroidism, Acquired  Sep 17 2013  1:30PM   

 

 Chronic kidney disease  Sep 17 2013  1:30PM   

 

 Yeast Of Skin  Sep 17 2013  1:30PM   

 

 Hypothyroidism, Acquired  Oct 31 2013 10:07AM   

 

 Hypothyroidism, Acquired  Oct  6 2014  3:25PM   

 

 Hyperglycemia  Oct  6 2014  3:25PM   

 

 Esophageal Reflux  Oct  6 2014  3:25PM   

 

 Diabetes Mellitus, Type II  Oct 13 2014 10:43AM   

 

 Esophageal Reflux  Oct 13 2014 10:43AM   

 

 Bronchitis, Acute  2014  5:55PM   

 

 Upper Respiratory Infections  2014  5:55PM   

 

 Hernia, incisional  Dec 16 2014  5:12PM   

 

 Abdominal pain  Dec 16 2014  5:12PM   

 

 Abdominal Pain  Dec 17 2014  9:49AM   

 

 Abdominal pain, RUQ  Dec 22 2014  2:02PM   

 

 Chronic Cholecystitis  Dec 22 2014  2:02PM   

 

 Incisional Hernia: No Obstruction Or Gangrene  Dec 22 2014  2:02PM   

 

 Postoperative Follow-up: Cholecystectomy  2015  1:49PM   

 

 Postoperative Follow-Up Visit  2015  1:38PM   

 

 Postoperative Follow-Up Visit  2015  1:56PM   

 

 Bronchitis  Mar 24 2015  6:10PM   

 

 Postoperative Follow-Up Visit  2015  3:23PM   

 

 Screening Mammogram  May 11 2015 12:13PM   

 

 Hypothyroidism, Acquired  Sep 14 2016 10:25AM   

 

 Low Back Pain  Sep 14 2016 10:25AM   

 

 Hyperglycemia  Sep 14 2016 10:25AM   

 

 Bronchitis  Sep 29 2016  7:12PM   

 

 Wheezing  Sep 29 2016  7:12PM   

 

 Coughing  Sep 29 2016  7:12PM   

 

 Cough  Oct 26 2016  5:06PM   

 

 Shortness of breath  Oct 26 2016  5:06PM   

 

 Chronic obstructive pulmonary disease, unspecified COPD type  Oct 26 2016  5:
06PM   

 

 Encounter for screening mammogram for breast cancer  2018 10:38AM   

 

 Hypertension  2018  9:09AM   

 

 Hyperlipidemia, unspecified  2018  9:09AM   

 

 Fibromyalgia  2018  9:09AM   







Payers







 Insurance Name  Company Name  Plan Name  Plan Number  Policy Number  Policy 
Group Number  Start Date

 

    Medicare RHC  Medicare R[employer]     [id number]     N/A

 

    Medicare Part B  Medicare [facility]     [id number]     N/A

 

    BCBS  Bcbs [facility]     [id number]     N/A

 

    [place] [employer]  [employer] [employer]     
[id number]     N/A

 

    Medicare Part A  Medicare Part A     [id number]     N/A

 

    Medicare Part A  Medicare - Lab/Xray     [id number]     N/A







History of Encounters







 Visit Date  Visit Type  Provider

 

 2018  Office visit  Navin Raza MD

 

 10/26/2016  Office visit  Saqib Bustillos APRN

 

 2016  Office visit  Saqib HUNTER

 

 2016  Office visit   

 

 2016  Office visit  Navin Raza MD

 

 3/24/2015  Office visit  Carley BUSH

 

 2015  Office visit  Javier Preston MD

 

 2015  Office visit  Javier Preston MD

 

 2015  Office visit  Javier Preston MD

 

 2015  Office visit  Javier Preston MD

 

 2014  Hospital  Javier Preston MD

 

 2014  Hospital  Javier Preston MD

 

 2014  Office visit   

 

 2014  Office visit  Javier Preston MD

 

 2014  Office visit  Navin Raza MD

 

 2014  Office visit  Rosamaria Maria APRN

 

 2014  Office visit  Sugey Hawkins APRN

 

 10/13/2014  Office visit  Navin Raza MD

 

 10/6/2014  Office visit   

 

 10/6/2014  Office visit  Navin Raza MD

 

 10/31/2013  Office visit  Navin Raza MD

 

 2013  Office visit  Navin Raza MD

 

 2012  Office visit  Navin Raza MD

## 2018-11-16 LAB
ALBUMIN SERPL-MCNC: 3.8 GM/DL (ref 3.2–4.5)
ALP SERPL-CCNC: 480 U/L (ref 40–136)
ALT SERPL-CCNC: 13 U/L (ref 0–55)
BASOPHILS # BLD AUTO: 0 10^3/UL (ref 0–0.1)
BASOPHILS NFR BLD AUTO: 0 % (ref 0–10)
BILIRUB SERPL-MCNC: 0.2 MG/DL (ref 0.1–1)
BUN/CREAT SERPL: 6
CALCIUM SERPL-MCNC: 9.2 MG/DL (ref 8.5–10.1)
CHLORIDE SERPL-SCNC: 100 MMOL/L (ref 98–107)
CO2 SERPL-SCNC: 22 MMOL/L (ref 21–32)
CREAT SERPL-MCNC: 1.2 MG/DL (ref 0.6–1.3)
EOSINOPHIL # BLD AUTO: 0.1 10^3/UL (ref 0–0.3)
EOSINOPHIL NFR BLD AUTO: 1 % (ref 0–10)
ERYTHROCYTE [DISTWIDTH] IN BLOOD BY AUTOMATED COUNT: 17 % (ref 10–14.5)
GFR SERPLBLD BASED ON 1.73 SQ M-ARVRAT: 44 ML/MIN
GLUCOSE SERPL-MCNC: 169 MG/DL (ref 70–105)
HCT VFR BLD CALC: 29 % (ref 35–52)
HGB BLD-MCNC: 9.4 G/DL (ref 11.5–16)
LYMPHOCYTES # BLD AUTO: 1.9 X 10^3 (ref 1–4)
LYMPHOCYTES NFR BLD AUTO: 27 % (ref 12–44)
MANUAL DIFFERENTIAL PERFORMED BLD QL: NO
MCH RBC QN AUTO: 28 PG (ref 25–34)
MCHC RBC AUTO-ENTMCNC: 32 G/DL (ref 32–36)
MCV RBC AUTO: 86 FL (ref 80–99)
MONOCYTES # BLD AUTO: 1.5 X 10^3 (ref 0–1)
MONOCYTES NFR BLD AUTO: 22 % (ref 0–12)
NEUTROPHILS # BLD AUTO: 3.4 X 10^3 (ref 1.8–7.8)
NEUTROPHILS NFR BLD AUTO: 50 % (ref 42–75)
PLATELET # BLD: 421 10^3/UL (ref 130–400)
PMV BLD AUTO: 9.1 FL (ref 7.4–10.4)
POTASSIUM SERPL-SCNC: 4.3 MMOL/L (ref 3.6–5)
PROT SERPL-MCNC: 7.3 GM/DL (ref 6.4–8.2)
RBC # BLD AUTO: 3.4 10^6/UL (ref 4.35–5.85)
SODIUM SERPL-SCNC: 131 MMOL/L (ref 135–145)
WBC # BLD AUTO: 6.8 10^3/UL (ref 4.3–11)

## 2018-12-06 ENCOUNTER — HOSPITAL ENCOUNTER (OUTPATIENT)
Dept: HOSPITAL 75 - RAD | Age: 73
End: 2018-12-06
Attending: INTERNAL MEDICINE
Payer: MEDICARE

## 2018-12-06 DIAGNOSIS — C34.90: Primary | ICD-10-CM

## 2018-12-06 DIAGNOSIS — Z95.828: ICD-10-CM

## 2018-12-06 DIAGNOSIS — R10.9: ICD-10-CM

## 2018-12-06 DIAGNOSIS — K76.89: ICD-10-CM

## 2018-12-06 DIAGNOSIS — R59.0: ICD-10-CM

## 2018-12-06 PROCEDURE — 71260 CT THORAX DX C+: CPT

## 2018-12-06 PROCEDURE — 74178 CT ABD&PLV WO CNTR FLWD CNTR: CPT

## 2018-12-06 NOTE — DIAGNOSTIC IMAGING REPORT
PROCEDURE: CT chest with contrast, CT abdomen and pelvis with and

without contrast.



TECHNIQUE: Pre and post intravenous contrast axial imaging of the

abdomen and pelvis and post contrast axial imaging of the chest

were performed.



INDICATION:  Lung cancer. Patient also complains of right hip

pain and abdominal pain.



COMPARISON: Correlation is made with outside CT of the chest from

10/04/2018. No prior CT of the abdomen and pelvis study is

available for comparison.



CT CHEST:



A right chest wall port has the tip at the SVC right atrial

junction. No axillary lymphadenopathy is seen. There is marked

improvement in the chest when compared with outside CT. Bulky

mediastinal lymphadenopathy has markedly reduced in size. There

is only a mildly prominent lymph node in the precarinal location

measuring 2.1 x 1.1 cm. Prior exam demonstrated marked bulky

lymphadenopathy in the mediastinum with significant consolidation

to the left upper lobe. No hilar mass is seen on today's study.

There is some residual minimal linear parenchymal density in the

left upper lobe consistent with some minimal residual

subsegmental atelectasis or scarring. Remainder of the lung

fields appear to be clear. No parenchymal mass is seen. No

pericardial or pleural fluid is identified.



IMPRESSION: Significant favorable response to therapy when

compared with CT examination two months earlier. There has been

near complete resolution of mediastinal and hilar masslike

densities with only a single residual precarinal enlarged lymph

node present on today's exam. Left upper lobe is no longer

consolidated. There is only minimal residual linear atelectasis

or scarring present in the left upper lobe.



CT ABDOMEN AND PELVIS:



There are multiple subcentimeter low-density nodules throughout

the liver which cannot be characterized as cysts. These may

represent metastatic lesions. The limited imaging through the

upper abdomen from the CT of the chest study did show multiple

lesions as well which appear to have been much larger on the

prior CT. The gallbladder is surgically absent. There is no

biliary ductal dilatation. The pancreas and spleen are

unremarkable. No adrenal mass is identified. Kidneys contain

cortical cysts. Aorta is calcified but nonaneurysmal. No central

retroperitoneal or mesenteric lymphadenopathy is seen. Bowel

loops appear to be nonobstructed. There is moderate stool in the

colon. There is no ascites. Bladder is unremarkable. No definite

pelvic lymphadenopathy is seen.



IMPRESSION: There are multiple small nodules throughout the liver

suggestive of hepatic metastatic disease however, these appear to

be reduced in size when compared with the outside CT of the chest

from 10/04/2018.



Dictated by: 



  Dictated on workstation # [id number]

## 2018-12-12 LAB
ALBUMIN SERPL-MCNC: 4 GM/DL (ref 3.2–4.5)
ALP SERPL-CCNC: 331 U/L (ref 40–136)
ALT SERPL-CCNC: 13 U/L (ref 0–55)
BASOPHILS # BLD AUTO: 0.1 10^3/UL (ref 0–0.1)
BASOPHILS NFR BLD AUTO: 0 % (ref 0–10)
BILIRUB SERPL-MCNC: 0.2 MG/DL (ref 0.1–1)
BUN/CREAT SERPL: 11
CALCIUM SERPL-MCNC: 9.2 MG/DL (ref 8.5–10.1)
CHLORIDE SERPL-SCNC: 101 MMOL/L (ref 98–107)
CO2 SERPL-SCNC: 23 MMOL/L (ref 21–32)
CREAT SERPL-MCNC: 0.85 MG/DL (ref 0.6–1.3)
EOSINOPHIL # BLD AUTO: 0.1 10^3/UL (ref 0–0.3)
EOSINOPHIL NFR BLD AUTO: 1 % (ref 0–10)
ERYTHROCYTE [DISTWIDTH] IN BLOOD BY AUTOMATED COUNT: 23.1 % (ref 10–14.5)
GFR SERPLBLD BASED ON 1.73 SQ M-ARVRAT: > 60 ML/MIN
GLUCOSE SERPL-MCNC: 98 MG/DL (ref 70–105)
HCT VFR BLD CALC: 29 % (ref 35–52)
HGB BLD-MCNC: 9.5 G/DL (ref 11.5–16)
LYMPHOCYTES # BLD AUTO: 2.1 X 10^3 (ref 1–4)
LYMPHOCYTES NFR BLD AUTO: 16 % (ref 12–44)
MANUAL DIFFERENTIAL PERFORMED BLD QL: NO
MCH RBC QN AUTO: 29 PG (ref 25–34)
MCHC RBC AUTO-ENTMCNC: 32 G/DL (ref 32–36)
MCV RBC AUTO: 88 FL (ref 80–99)
MONOCYTES # BLD AUTO: 1.8 X 10^3 (ref 0–1)
MONOCYTES NFR BLD AUTO: 14 % (ref 0–12)
NEUTROPHILS # BLD AUTO: 8.5 X 10^3 (ref 1.8–7.8)
NEUTROPHILS NFR BLD AUTO: 68 % (ref 42–75)
PLATELET # BLD: 455 10^3/UL (ref 130–400)
PMV BLD AUTO: 9 FL (ref 7.4–10.4)
POTASSIUM SERPL-SCNC: 4.5 MMOL/L (ref 3.6–5)
PROT SERPL-MCNC: 7 GM/DL (ref 6.4–8.2)
RBC # BLD AUTO: 3.33 10^6/UL (ref 4.35–5.85)
SODIUM SERPL-SCNC: 133 MMOL/L (ref 135–145)
WBC # BLD AUTO: 12.5 10^3/UL (ref 4.3–11)

## 2019-01-09 ENCOUNTER — HOSPITAL ENCOUNTER (EMERGENCY)
Dept: HOSPITAL 75 - ER | Age: 74
Discharge: HOME | End: 2019-01-09
Payer: MEDICARE

## 2019-01-09 ENCOUNTER — HOSPITAL ENCOUNTER (OUTPATIENT)
Dept: HOSPITAL 75 - ONC | Age: 74
LOS: 13 days | Discharge: HOME | End: 2019-01-22
Attending: INTERNAL MEDICINE
Payer: MEDICARE

## 2019-01-09 VITALS — WEIGHT: 154 LBS | BODY MASS INDEX: 25.66 KG/M2 | HEIGHT: 65 IN

## 2019-01-09 VITALS — BODY MASS INDEX: 26.99 KG/M2 | HEIGHT: 65 IN | WEIGHT: 162 LBS

## 2019-01-09 VITALS — SYSTOLIC BLOOD PRESSURE: 141 MMHG | DIASTOLIC BLOOD PRESSURE: 75 MMHG

## 2019-01-09 DIAGNOSIS — Z51.11: ICD-10-CM

## 2019-01-09 DIAGNOSIS — M62.831: ICD-10-CM

## 2019-01-09 DIAGNOSIS — Z98.890: ICD-10-CM

## 2019-01-09 DIAGNOSIS — F17.210: ICD-10-CM

## 2019-01-09 DIAGNOSIS — Z51.0: Primary | ICD-10-CM

## 2019-01-09 DIAGNOSIS — Z90.81: ICD-10-CM

## 2019-01-09 DIAGNOSIS — M25.551: Primary | ICD-10-CM

## 2019-01-09 DIAGNOSIS — C78.7: ICD-10-CM

## 2019-01-09 DIAGNOSIS — Z79.899: ICD-10-CM

## 2019-01-09 DIAGNOSIS — I10: ICD-10-CM

## 2019-01-09 DIAGNOSIS — C34.90: ICD-10-CM

## 2019-01-09 DIAGNOSIS — Z87.891: ICD-10-CM

## 2019-01-09 DIAGNOSIS — J44.9: ICD-10-CM

## 2019-01-09 DIAGNOSIS — C34.12: ICD-10-CM

## 2019-01-09 LAB
BASOPHILS # BLD AUTO: 0 10^3/UL (ref 0–0.1)
BASOPHILS NFR BLD AUTO: 0 % (ref 0–10)
BUN/CREAT SERPL: 18
CALCIUM SERPL-MCNC: 8.7 MG/DL (ref 8.5–10.1)
CHLORIDE SERPL-SCNC: 98 MMOL/L (ref 98–107)
CO2 SERPL-SCNC: 25 MMOL/L (ref 21–32)
CREAT SERPL-MCNC: 0.78 MG/DL (ref 0.6–1.3)
EOSINOPHIL # BLD AUTO: 0 10^3/UL (ref 0–0.3)
EOSINOPHIL NFR BLD AUTO: 0 % (ref 0–10)
ERYTHROCYTE [DISTWIDTH] IN BLOOD BY AUTOMATED COUNT: 20.2 % (ref 10–14.5)
GFR SERPLBLD BASED ON 1.73 SQ M-ARVRAT: > 60 ML/MIN
GLUCOSE SERPL-MCNC: 97 MG/DL (ref 70–105)
HCT VFR BLD CALC: 28 % (ref 35–52)
HGB BLD-MCNC: 8.9 G/DL (ref 11.5–16)
LYMPHOCYTES # BLD AUTO: 1.4 X 10^3 (ref 1–4)
LYMPHOCYTES NFR BLD AUTO: 12 % (ref 12–44)
MANUAL DIFFERENTIAL PERFORMED BLD QL: NO
MCH RBC QN AUTO: 29 PG (ref 25–34)
MCHC RBC AUTO-ENTMCNC: 31 G/DL (ref 32–36)
MCV RBC AUTO: 92 FL (ref 80–99)
MONOCYTES # BLD AUTO: 0.1 X 10^3 (ref 0–1)
MONOCYTES NFR BLD AUTO: 1 % (ref 0–12)
NEUTROPHILS # BLD AUTO: 10 X 10^3 (ref 1.8–7.8)
NEUTROPHILS NFR BLD AUTO: 87 % (ref 42–75)
PLATELET # BLD: 228 10^3/UL (ref 130–400)
PMV BLD AUTO: 9.4 FL (ref 7.4–10.4)
POTASSIUM SERPL-SCNC: 4.6 MMOL/L (ref 3.6–5)
RBC # BLD AUTO: 3.1 10^6/UL (ref 4.35–5.85)
SODIUM SERPL-SCNC: 130 MMOL/L (ref 135–145)
WBC # BLD AUTO: 11.5 10^3/UL (ref 4.3–11)

## 2019-01-09 PROCEDURE — 99204 OFFICE O/P NEW MOD 45 MIN: CPT

## 2019-01-09 PROCEDURE — 77334 RADIATION TREATMENT AID(S): CPT

## 2019-01-09 PROCEDURE — 77417 THER RADIOLOGY PORT IMAGE(S): CPT

## 2019-01-09 PROCEDURE — 96417 CHEMO IV INFUS EACH ADDL SEQ: CPT

## 2019-01-09 PROCEDURE — 36593 DECLOT VASCULAR DEVICE: CPT

## 2019-01-09 PROCEDURE — 99214 OFFICE O/P EST MOD 30 MIN: CPT

## 2019-01-09 PROCEDURE — 77295 3-D RADIOTHERAPY PLAN: CPT

## 2019-01-09 PROCEDURE — 77470 SPECIAL RADIATION TREATMENT: CPT

## 2019-01-09 PROCEDURE — 77290 THER RAD SIMULAJ FIELD CPLX: CPT

## 2019-01-09 PROCEDURE — 96411 CHEMO IV PUSH ADDL DRUG: CPT

## 2019-01-09 PROCEDURE — 36591 DRAW BLOOD OFF VENOUS DEVICE: CPT

## 2019-01-09 PROCEDURE — 96367 TX/PROPH/DG ADDL SEQ IV INF: CPT

## 2019-01-09 PROCEDURE — 36415 COLL VENOUS BLD VENIPUNCTURE: CPT

## 2019-01-09 PROCEDURE — 99213 OFFICE O/P EST LOW 20 MIN: CPT

## 2019-01-09 PROCEDURE — 96372 THER/PROPH/DIAG INJ SC/IM: CPT

## 2019-01-09 PROCEDURE — 96413 CHEMO IV INFUSION 1 HR: CPT

## 2019-01-09 PROCEDURE — 77300 RADIATION THERAPY DOSE PLAN: CPT

## 2019-01-09 PROCEDURE — 85025 COMPLETE CBC W/AUTO DIFF WBC: CPT

## 2019-01-09 PROCEDURE — 96375 TX/PRO/DX INJ NEW DRUG ADDON: CPT

## 2019-01-09 PROCEDURE — 80048 BASIC METABOLIC PNL TOTAL CA: CPT

## 2019-01-09 PROCEDURE — 80053 COMPREHEN METABOLIC PANEL: CPT

## 2019-01-09 NOTE — ED GENERAL
General


Chief Complaint:  General Problems/Pain


Stated Complaint:  LEG PAIN;TROUBLE WALKING


Nursing Triage Note:  


TO ED PER W/C ACCPMPIED BY SON GET REPORTS HAS BONE CA C/O INCREASE IN PAIN 


IN HIPS WITH SPASAM. PAIN MEDS AT HOME NOT WORKING.


Nursing Sepsis Screen:  No Definite Risk


Source of Information:  Patient, Old Records


Exam Limitations:  No Limitations





Allergies and Home Medications


Allergies


Coded Allergies:  


     lisinopril (Verified  Allergy, Unknown, 1/9/19)





Home Medications


Amlodipine Besylate 5 Mg Tablet, 5 MG PO DAILY


   Prescribed by: MP SANCHES on 11/4/18 1941


Cyclobenzaprine HCl 10 Mg Tablet, 10 MG PO TID PRN for SPASMS


   Prescribed by: MICHAEL QUEEN on 1/9/19 0842


Dexamethasone 4 Mg Tablet, 4 MG PO ONCE


   Prescribed by: MP SANCHES on 11/4/18 2017





Past Medical-Social-Family Hx


Patient Social History


Alcohol Use:  Denies Use


Recreational Drug Use:  No


Smoking Status:  Former Smoker


2nd Hand Smoke Exposure:  No


Recent Foreign Travel:  No


Contact w/Someone Who Travel:  No


Recent Infectious Disease Expo:  No


Recent Hopitalizations:  Yes





Seasonal Allergies


Seasonal Allergies:  No





Past Medical History


Surgeries:  Yes (spleenectomy)


Abdominal


Respiratory:  No


Cardiac:  Yes


Hypertension


Neurological:  No


Genitourinary:  No


Gastrointestinal:  No


Musculoskeletal:  No


Endocrine:  No


HEENT:  No


Cancer:  Yes


Lung


Psychosocial:  No


Integumentary:  No





Family Medical History


No Pertinent Family Hx





Physical Exam


Vital Signs





Vital Signs - First Documented








 1/9/19





 07:34


 


Temp 98.0


 


Pulse 94


 


Resp 18


 


B/P (MAP) 143/91 (108)


 


Pulse Ox 98





Capillary Refill : Less Than 3 Seconds


Height, Weight, BMI


Height: 5'5.00"


Weight: 154lbs. oz. 69.080495wu;  BMI


Method:Stated





Progress/Results/Core Measures


Suspected Sepsis


Recent Fever Within 48 Hours:  No


Infection Criteria Present:  None


New/Unexplained  Altered Menta:  No


Sepsis Screen:  No Definite Risk


SIRS


Temperature:98.0 


Pulse: 94 


Respiratory Rate: 18


 


Blood Pressure 143 /91 


Mean: 108





Results/Orders


My Orders





Orders - MICHAEL ZALDIVAR MD


Fentanyl  Injection (Sublimaze Injection (1/9/19 08:00)


Orphenadrine Injection (Norflex Injectio (1/9/19 08:00)


Pelvis With Right Hip 2-3views (1/9/19 07:48)


Ketorolac Injection (Toradol Injection) (1/9/19 08:30)


Oxycodone/Apap 5/325mg Tablet (Percocet (1/9/19 08:30)





Medications Given in ED





Current Medications








 Medications  Dose


 Ordered  Sig/Juanita


 Route  Start Time


 Stop Time Status Last Admin


Dose Admin


 


 Fentanyl Citrate  75 mcg  ONCE  ONCE


 IM  1/9/19 08:00


 1/9/19 08:01 DC 1/9/19 07:55


75 MCG


 


 Ketorolac


 Tromethamine  30 mg  ONCE  ONCE


 IM  1/9/19 08:30


 1/9/19 08:32 DC 1/9/19 08:40


30 MG


 


 Orphenadrine


 Citrate  60 mg  ONCE  ONCE


 IM  1/9/19 08:00


 1/9/19 08:01 DC 1/9/19 07:54


60 MG


 


 Oxycodone/


 Acetaminophen  1 tab  ONCE  ONCE


 PO  1/9/19 08:30


 1/9/19 08:32 DC 1/9/19 08:39


1 TAB








Vital Signs/I&O











 1/9/19





 07:34


 


Temp 98.0


 


Pulse 94


 


Resp 18


 


B/P (MAP) 143/91 (108)


 


Pulse Ox 98





Capillary Refill : Less Than 3 Seconds








Blood Pressure Mean:  108











Departure


Impression





 Primary Impression:  


 Right hip pain


 Additional Impressions:  


 Muscle spasm of right leg


 Metastatic cancer


Disposition:  01 HOME, SELF-CARE


Condition:  Improved





Departure-Patient Inst.


Decision time for Depature:  08:37


Referrals:  


SHIRA BRAR MD (PCP/Family)


Primary Care Physician





Add. Discharge Instructions:  


You may take your prescription pain medication as prescribed.


You may also take ibuprofen up to 400 mg 3 times a day as needed for additional 

pain relief.  Ibuprofen may actually be more helpful for your hip pain than 

narcotics.  Take ibuprofen with food or milk to avoid irritation of your 

stomach and to drink plenty of clear liquids.


You may take cyclobenzaprine as prescribed for muscle spasms.


[facility] for your appointment with Dr. Roy.


Follow-up with your primary care provider as well as soon as possible.


Return to emergency room if you have worsening symptoms.











All discharge instructions reviewed with patient and/or family. Voiced 

understanding.


Scripts


Cyclobenzaprine HCl (Cyclobenzaprine HCl) 10 Mg Tablet


10 MG PO TID PRN for SPASMS, #10 TAB


   Prov: MICHAEL ZALDIVAR MD         1/9/19





Copy


Copies To 1:   FILEMON SHERWOOD MD


Copies To 2:   MYERS,DUANE E MD BRUEGGEMANN,JOSHUA T MD Jan 9, 2019 08:38

## 2019-01-09 NOTE — XMS REPORT
Continuity of Care Document

 Created on: 2019



JESIKA TREJO

External Reference #: [id number]

: 1945

Sex: Female



Demographics







 Address  [address]

[place]  [id number]

 

 Home Phone  [phone] x

 

 Preferred Language  Unknown

 

 Marital Status  Unknown

 

 Gnosticist Affiliation  Unknown

 

 Race  Unknown

 

 Ethnic Group  Unknown





Author







 Author  [facility]

 

 Organization  [facility]

 

 Address  Unknown

 

 Phone  Unavailable



              



Allergies

      





 Active            Description            Code            Type            
Severity            Reaction            Onset            Reported/Identified   
         Relationship to Patient            Clinical Status        

 

 Yes            LISINOPRIL            [id number]            DRUG            N/A  
          N/A                                                            

 

 Yes            PCN (penicillin)            [id number]            CLASS          
  N/A            N/A                                                            

 

 Yes            No Known Drug Allergies            [id number]            Drug 
Allergy            Unknown            N/A                         2018   
                               



                      



Medications

      



There is no data.                  



Problems

      





 Date Dx Coded            Attending            Type            Code            
Diagnosis            Diagnosed By        

 

 2015            SHIRA BRAR MD, Ot            V76.12    
                              

 

 10/17/2018            SHIRA BRAR MD, Ot            V76.12    
        OTH SCREEN MAMMO-MALIGN NEOPLASM OF LARRY                     

 

 10/24/2018            SHIRA BRAR MD, Ot            V76.12    
        OTH SCREEN MAMMO-MALIGN NEOPLASM OF LARRY                     

 

 10/29/2018            SHIRA BRAR MD            Ot            V76.12    
        OTH SCREEN MAMMO-MALIGN NEOPLASM OF LARRY                     

 

 2018            SHIRA BRAR MD, Ot            V76.12    
        OTH SCREEN MAMMO-MALIGN NEOPLASM OF LARRY                     

 

 2018            MP SANCHES MD            Ot            C34.90  
          MALIGNANT NEOPLASM OF UNSP PART OF UNSP                      

 

 2018            MP SANCHES MD            Ot            I10     
       ESSENTIAL (PRIMARY) HYPERTENSION                     

 

 2018            MP SANCHES MD            Ot            K14.8   
         OTHER DISEASES OF TONGUE                     

 

 2018            MP SANCHES MD            Ot            T78.3XXA
            ANGIONEUROTIC EDEMA, INITIAL ENCOUNTER                     

 

 2018            MP SANCHES MD            Ot            Z87.891 
           PERSONAL HISTORY OF NICOTINE DEPENDENCE                     

 

 2018            FILEMON SHERWOOD MD, Ot            C34.12         
   MALIGNANT NEOPLASM OF UPPER LOBE, LEFT B                     

 

 2018            FILEMON SHERWOOD MD            Ot            C78.7          
  SECONDARY MALIG NEOPLASM OF LIVER AND IN                     

 

 2018            FILEMON SHERWOOD MD            Ot            F17.210        
    NICOTINE DEPENDENCE, CIGARETTES, UNCOMPL                     

 

 2018            FILEMON SHERWOOD MD            Ot            I10            
ESSENTIAL (PRIMARY) HYPERTENSION                     

 

 2018            FILEMON SHERWOOD MD            Ot            J44.9          
  CHRONIC OBSTRUCTIVE PULMONARY DISEASE, U                     

 

 2018            FILEMON SHERWOOD MD            Ot            Z79.899        
    OTHER LONG TERM (CURRENT) DRUG THERAPY                     

 

 12/10/2018            FILEMON SHERWOOD MD            Ot            C34.90         
   MALIGNANT NEOPLASM OF UNSP PART OF UNSP                      

 

 12/10/2018            FILEMON SHERWOOD MD            Ot            K76.89         
   OTHER SPECIFIED DISEASES OF LIVER                     

 

 12/10/2018            FILEMON SHERWOOD MD            Ot            R10.9          
  UNSPECIFIED ABDOMINAL PAIN                     

 

 12/10/2018            FILEMON SHERWOOD MD            Ot            R59.0          
  LOCALIZED ENLARGED LYMPH NODES                     

 

 12/10/2018            FILEMON SHERWOOD MD            Ot            Z95.828        
    PRESENCE OF OTHER VASCULAR IMPLANTS AND                      

 

 2018            FILEMON SHERWOOD MD            Ot            C34.12         
   MALIGNANT NEOPLASM OF UPPER LOBE, LEFT B                     

 

 2018            FILEMON SHERWOOD MD            Ot            C78.7          
  SECONDARY MALIG NEOPLASM OF LIVER AND IN                     

 

 2018            FILEMON SHERWOOD MD            Ot            F17.210        
    NICOTINE DEPENDENCE, CIGARETTES, UNCOMPL                     

 

 2018            FILEMON SHERWOOD MD            Ot            I10            
ESSENTIAL (PRIMARY) HYPERTENSION                     

 

 2018            FILEMON SHERWOOD MD            Ot            J44.9          
  CHRONIC OBSTRUCTIVE PULMONARY DISEASE, U                     

 

 2018            FILEMON SHERWOOD MD            Ot            Z79.899        
    OTHER LONG TERM (CURRENT) DRUG THERAPY                     

 

 2018            FILEMON SHERWOOD MD            Ot            C34.90         
   MALIGNANT NEOPLASM OF UNSP PART OF UNSP                      

 

 2018            FILEMON SHERWOOD MD            Ot            K76.89         
   OTHER SPECIFIED DISEASES OF LIVER                     

 

 2018            FILEMON SHERWOOD MD            Ot            R10.9          
  UNSPECIFIED ABDOMINAL PAIN                     

 

 2018            FILEMON SHERWOOD MD            Ot            R59.0          
  LOCALIZED ENLARGED LYMPH NODES                     

 

 2018            FILEMON SHERWOOD MD            Ot            Z95.828        
    PRESENCE OF OTHER VASCULAR IMPLANTS AND                      

 

 2018                         P                        Malignant 
neoplasm of unspecified part of unspecified bronchus or lung                   
  

 

 2019            FILEMON SHERWOOD MD            Ot            C34.12         
   MALIGNANT NEOPLASM OF UPPER LOBE, LEFT B                     

 

 2019            FILEMON SHERWOOD MD            Ot            C78.7          
  SECONDARY MALIG NEOPLASM OF LIVER AND IN                     

 

 2019            FILEMON SHERWOOD MD            Ot            F17.210        
    NICOTINE DEPENDENCE, CIGARETTES, UNCOMPL                     

 

 2019            FILEMON SHERWOOD MD            Ot            I10            
ESSENTIAL (PRIMARY) HYPERTENSION                     

 

 2019            FILEMON SHERWOOD MD, Ot            J44.9          
  CHRONIC OBSTRUCTIVE PULMONARY DISEASE, U                     

 

 2019            FILEMON SHERWOOD MD, Ot            Z79.899        
    OTHER LONG TERM (CURRENT) DRUG THERAPY                     

 

 2019            FILEMON SHERWOOD MD, Ot            C34.12         
   MALIGNANT NEOPLASM OF UPPER LOBE, LEFT B                     

 

 2019            FILEMON SHERWOOD MD, Ot            C78.7          
  SECONDARY MALIG NEOPLASM OF LIVER AND IN                     

 

 2019            FILEMON SHERWOOD MD, Ot            F17.210        
    NICOTINE DEPENDENCE, CIGARETTES, UNCOMPL                     

 

 2019            FILEMON SHERWOOD MD, Ot            I10            
ESSENTIAL (PRIMARY) HYPERTENSION                     

 

 2019            FILEMON SHERWOOD MD, Ot            J44.9          
  CHRONIC OBSTRUCTIVE PULMONARY DISEASE, U                     

 

 2019            FILEMON SHERWOOD MD, Ot            Z79.899        
    OTHER LONG TERM (CURRENT) DRUG THERAPY                     



                                                                               
                           



Procedures

      



There is no data.                  



Results

      





 Test            Result            Range        









 Complete blood count (CBC) with automated white blood cell (WBC) differential 
- 18 19:09         









 Blood leukocytes automated count (number/volume)            8.1 10*3/uL       
     4.3-11.0        

 

 Blood erythrocytes automated count (number/volume)            3.91 10*6/uL    
        4.35-5.85        

 

 Venous blood hemoglobin measurement (mass/volume)            10.7 g/dL        
    11.5-16.0        

 

 Blood hematocrit (volume fraction)            33 %            35-52        

 

 Automated erythrocyte mean corpuscular volume            83 [foz_us]          
  80-99        

 

 Automated erythrocyte mean corpuscular hemoglobin (mass per erythrocyte)      
      27 pg            25-34        

 

 Automated erythrocyte mean corpuscular hemoglobin concentration measurement (
mass/volume)            33 g/dL            32-36        

 

 Automated erythrocyte distribution width ratio            14.9 %            
10.0-14.5        

 

 Automated blood platelet count (count/volume)            342 10*3/uL          
  130-400        

 

 Automated blood platelet mean volume measurement            9.5 [foz_us]      
      7.4-10.4        

 

 Automated blood neutrophils/100 leukocytes            82 %            42-75   
     

 

 Automated blood lymphocytes/100 leukocytes            17 %            12-44   
     

 

 Blood monocytes/100 leukocytes            1 %            0-12        

 

 Automated blood eosinophils/100 leukocytes            0 %            0-10     
   

 

 Automated blood basophils/100 leukocytes            0 %            0-10        

 

 Blood neutrophils automated count (number/volume)            6.6 10*3         
   1.8-7.8        

 

 Blood lymphocytes automated count (number/volume)            1.4 10*3         
   1.0-4.0        

 

 Blood monocytes automated count (number/volume)            0.0 10*3            
0.0-1.0        

 

 Automated eosinophil count            0.0 10*3/uL            0.0-0.3        

 

 Automated blood basophil count (count/volume)            0.0 10*3/uL          
  0.0-0.1        









 Comprehensive metabolic panel - 18 19:09         









 Serum or plasma sodium measurement (moles/volume)            130 mmol/L       
     135-145        

 

 Serum or plasma potassium measurement (moles/volume)            4.7 mmol/L    
        3.6-5.0        

 

 Serum or plasma chloride measurement (moles/volume)            98 mmol/L      
              

 

 Carbon dioxide            21 mmol/L            21-32        

 

 Serum or plasma anion gap determination (moles/volume)            11 mmol/L   
         5-14        

 

 Serum or plasma urea nitrogen measurement (mass/volume)            15 mg/dL   
         7-18        

 

 Serum or plasma creatinine measurement (mass/volume)            1.06 mg/dL    
        0.60-1.30        

 

 Serum or plasma urea nitrogen/creatinine mass ratio            14             
NRG        

 

 Serum or plasma creatinine measurement with calculation of estimated 
glomerular filtration rate            51             NRG        

 

 Serum or plasma glucose measurement (mass/volume)            113 mg/dL        
            

 

 Serum or plasma calcium measurement (mass/volume)            9.4 mg/dL        
    8.5-10.1        

 

 Serum or plasma total bilirubin measurement (mass/volume)            0.4 mg/dL
            0.1-1.0        

 

 Serum or plasma alkaline phosphatase measurement (enzymatic activity/volume)  
          533 U/L                    

 

 Serum or plasma aspartate aminotransferase measurement (enzymatic activity/
volume)            19 U/L            5-34        

 

 Serum or plasma alanine aminotransferase measurement (enzymatic activity/volume
)            17 U/L            0-55        

 

 Serum or plasma protein measurement (mass/volume)            7.4 g/dL         
   6.4-8.2        

 

 Serum or plasma albumin measurement (mass/volume)            3.7 g/dL         
   3.2-4.5        

 

 CALCIUM CORRECTED            9.6 mg/dL            8.5-10.1        



                  



Encounters

      





 ACCT No.            Visit Date/Time            Discharge            Status    
        Pt. Type            Provider            Facility            Loc./Unit  
          Complaint        

 

 [id number]            2019 10:59:00            2019 23:59:59    
        [facility]            Outpatient            FILEMON SHERWOOD MD            [facility]                     

 

 [id number]            2018 12:23:00            2018 23:59:59    
        [facility]            Outpatient            FILEMON SHERWOOD MD            [facility]            RAD            LUNG CANCER        

 

 [id number]            2018 17:24:00            2018 20:18:00    
        [facility]            Outpatient            MYRON JOHNSON, MP [place]            
[facility]            ER            POST CHEMO PROBLEMS  
      

 

 [id number]            2015 15:11:00            2015 23:59:59    
        [facility]            Outpatient            SHIRA BRAR MD            
[facility]            RAD            SCREENING        

 

 [id number]            2018 08:26:28                                      
Document Registration                                                          
  

 

 [id number]            2018 11:26:39                                      
Document Registration                                                          
  

 

 [id number]            2018 11:44:49                                      
Document Registration                                                          
  

 

 [id number]            2018 08:46:34                                      
Document Registration                                                          
  

 

 [id number]            2018 12:39:06                                      
Document Registration                                                          
  

 

 [id number]            2018 01:08:08                                      
Document Registration                                                          
  

 

 [id number]            2018 01:00:47                                      
Document Registration                                                          
  

 

 [id number]            10/15/2018 09:50:10                                      
Document Registration                                                          
  

 

 [id number]            10/07/2018 22:31:06                                      
Document Registration                                                          
  

 

 [id number]            10/01/2018 19:18:54                                      
Document Registration                                                          
  

 

 [id number]            10/01/2018 19:13:05                                      
Document Registration                                                          
  

 

 [id number]            2018 10:37:11                                      
Document Registration                                                          
  

 

 [id number]            2018 17:09:34            2018 23:59:59          
  [facility]            Outpatient            DANICA Palma                        
                       

 

 [id number]            10/10/2018 09:49:55            10/10/2018 23:59:59          
  [facility]            Outpatient            Shira Brar                         
                      

 

 [id number]            2018 18:06:28            2018 23:59:59          
  [facility]            Outpatient            Val Cole                        
                       

 

 [id number]            2018 09:35:39            2018 23:59:59          
  [facility]            Outpatient            Shira Brar                         
                      

 

 [id number]            10/26/2016 18:01:57            10/26/2016 23:59:59          
  [facility]            Outpatient            Saqib Bustillos                       
                        

 

 [id number]            2016 20:09:20            2016 23:59:59          
  [facility]            Outpatient            Saqib Bustillos                       
                        

 

 [id number]            2016 10:48:55            2016 23:59:59          
  [facility]            Outpatient            Shira Brar                         
                      

 

 [id number]            2015 18:29:45            2015 23:59:59          
  [facility]            Outpatient            Carley Correa                             
                  

 

 [id number]            2015 16:11:21            2015 23:59:59          
  [facility]            Outpatient            Javier Preston                           
                    

 

 [id number]            2015 07:25:45            2015 23:59:59          
  [facility]            Outpatient            Javier Preston                           
                    

 

 [id number]            2015 20:34:53            2015 23:59:59          
  [facility]            Outpatient            Javier Preston                           
                    

 

 [id number]            2015 14:28:09            2015 23:59:59          
  [facility]            Outpatient            Javier Preston                           
                    

 

 [id number]            2014 14:31:47            2014 23:59:59          
  [facility]            Outpatient            Javier Preston                           
                    

 

 [id number]            2014 10:13:56            2014 23:59:59          
  [facility]            Outpatient            Shira Brar                         
                      

 

 [id number]            2014 18:03:10            2014 23:59:59          
  [facility]            Outpatient            Rosamaria Maria                              
                 

 

 [id number]            2014 18:11:59            2014 23:59:59          
  [facility]            Outpatient            Sugey Hawkins                     
                          

 

 [id number]            10/13/2014 11:38:15            10/13/2014 23:59:59          
  [facility]            Outpatient            Shira Brar                         
                      

 

 [id number]            10/06/2014 16:19:02            10/06/2014 23:59:59          
  [facility]            Outpatient            Shira Brar

## 2019-01-09 NOTE — DIAGNOSTIC IMAGING REPORT
INDICATION: Right hip pain, no known injury.



AP pelvis and AP and oblique views of the right hip are obtained.



FINDINGS: No fracture or acute bony abnormality is seen. There is

no overt lytic or blastic lesion. There is degenerative change of

both hips of moderate severity with joint space narrowing and

osteophyte formation.



IMPRESSION:



Degenerative changes of both hips with no acute bony abnormality.



Dictated by: 



  Dictated on workstation # [id number]

## 2019-01-17 ENCOUNTER — HOSPITAL ENCOUNTER (INPATIENT)
Dept: HOSPITAL 75 - ER | Age: 74
LOS: 6 days | Discharge: NURSING, REHAB OR LONG-TERM CARE | DRG: 470 | End: 2019-01-23
Attending: FAMILY MEDICINE | Admitting: FAMILY MEDICINE
Payer: MEDICARE

## 2019-01-17 VITALS — DIASTOLIC BLOOD PRESSURE: 60 MMHG | SYSTOLIC BLOOD PRESSURE: 139 MMHG

## 2019-01-17 VITALS — HEIGHT: 65 IN | BODY MASS INDEX: 26.57 KG/M2 | WEIGHT: 159.5 LBS

## 2019-01-17 VITALS — SYSTOLIC BLOOD PRESSURE: 149 MMHG | DIASTOLIC BLOOD PRESSURE: 80 MMHG

## 2019-01-17 DIAGNOSIS — J44.9: ICD-10-CM

## 2019-01-17 DIAGNOSIS — C79.51: ICD-10-CM

## 2019-01-17 DIAGNOSIS — M84.551A: Primary | ICD-10-CM

## 2019-01-17 DIAGNOSIS — C78.7: ICD-10-CM

## 2019-01-17 DIAGNOSIS — I10: ICD-10-CM

## 2019-01-17 DIAGNOSIS — R50.9: ICD-10-CM

## 2019-01-17 DIAGNOSIS — Z87.891: ICD-10-CM

## 2019-01-17 DIAGNOSIS — F32.9: ICD-10-CM

## 2019-01-17 DIAGNOSIS — M62.10: ICD-10-CM

## 2019-01-17 DIAGNOSIS — F41.9: ICD-10-CM

## 2019-01-17 DIAGNOSIS — C34.90: ICD-10-CM

## 2019-01-17 LAB
%HYPO/RBC NFR BLD AUTO: SLIGHT %
ALBUMIN SERPL-MCNC: 4 GM/DL (ref 3.2–4.5)
ALP SERPL-CCNC: 307 U/L (ref 40–136)
ALT SERPL-CCNC: 12 U/L (ref 0–55)
ANISOCYTOSIS BLD QL SMEAR: SLIGHT
APTT PPP: YELLOW S
BACTERIA #/AREA URNS HPF: NEGATIVE /HPF
BASOPHILS # BLD AUTO: 0 10^3/UL (ref 0–0.1)
BASOPHILS NFR BLD AUTO: 1 % (ref 0–10)
BASOPHILS NFR BLD MANUAL: 1 %
BILIRUB SERPL-MCNC: 0.2 MG/DL (ref 0.1–1)
BILIRUB UR QL STRIP: NEGATIVE
BUN/CREAT SERPL: 6
CALCIUM SERPL-MCNC: 9.2 MG/DL (ref 8.5–10.1)
CHLORIDE SERPL-SCNC: 101 MMOL/L (ref 98–107)
CO2 SERPL-SCNC: 19 MMOL/L (ref 21–32)
CREAT SERPL-MCNC: 0.93 MG/DL (ref 0.6–1.3)
EOSINOPHIL # BLD AUTO: 0 10^3/UL (ref 0–0.3)
EOSINOPHIL NFR BLD AUTO: 1 % (ref 0–10)
EOSINOPHIL NFR BLD MANUAL: 0 %
ERYTHROCYTE [DISTWIDTH] IN BLOOD BY AUTOMATED COUNT: 20.5 % (ref 10–14.5)
FIBRINOGEN PPP-MCNC: CLEAR MG/DL
GFR SERPLBLD BASED ON 1.73 SQ M-ARVRAT: 59 ML/MIN
GLUCOSE SERPL-MCNC: 99 MG/DL (ref 70–105)
GLUCOSE UR STRIP-MCNC: NEGATIVE MG/DL
HCT VFR BLD CALC: 33 % (ref 35–52)
HGB BLD-MCNC: 10.1 G/DL (ref 11.5–16)
KETONES UR QL STRIP: NEGATIVE
LEUKOCYTE ESTERASE UR QL STRIP: NEGATIVE
LYMPHOCYTES # BLD AUTO: 1.5 X 10^3 (ref 1–4)
LYMPHOCYTES NFR BLD AUTO: 39 % (ref 12–44)
MANUAL DIFFERENTIAL PERFORMED BLD QL: YES
MCH RBC QN AUTO: 29 PG (ref 25–34)
MCHC RBC AUTO-ENTMCNC: 31 G/DL (ref 32–36)
MCV RBC AUTO: 93 FL (ref 80–99)
MONOCYTES # BLD AUTO: 0.7 X 10^3 (ref 0–1)
MONOCYTES NFR BLD AUTO: 19 % (ref 0–12)
MONOCYTES NFR BLD: 13 %
NEUTROPHILS # BLD AUTO: 1.5 X 10^3 (ref 1.8–7.8)
NEUTROPHILS NFR BLD AUTO: 40 % (ref 42–75)
NEUTS BAND NFR BLD MANUAL: 45 %
NEUTS BAND NFR BLD: 0 %
NITRITE UR QL STRIP: NEGATIVE
PH UR STRIP: 6 [PH] (ref 5–9)
PLATELET # BLD: 126 10^3/UL (ref 130–400)
PMV BLD AUTO: 10.9 FL (ref 7.4–10.4)
POIKILOCYTOSIS BLD QL SMEAR: SLIGHT
PROT SERPL-MCNC: 7.6 GM/DL (ref 6.4–8.2)
PROT UR QL STRIP: NEGATIVE
RBC # BLD AUTO: 3.51 10^6/UL (ref 4.35–5.85)
RBC #/AREA URNS HPF: (no result) /HPF
SODIUM SERPL-SCNC: 132 MMOL/L (ref 135–145)
SP GR UR STRIP: 1.01 (ref 1.02–1.02)
SPHEROCYTES BLD QL SMEAR: SLIGHT
SQUAMOUS #/AREA URNS HPF: (no result) /HPF
UROBILINOGEN UR-MCNC: NORMAL MG/DL
VARIANT LYMPHS NFR BLD MANUAL: 2 %
VARIANT LYMPHS NFR BLD MANUAL: 39 %
WBC # BLD AUTO: 3.8 10^3/UL (ref 4.3–11)
WBC #/AREA URNS HPF: (no result) /HPF

## 2019-01-17 PROCEDURE — 85610 PROTHROMBIN TIME: CPT

## 2019-01-17 PROCEDURE — 94664 DEMO&/EVAL PT USE INHALER: CPT

## 2019-01-17 PROCEDURE — 87081 CULTURE SCREEN ONLY: CPT

## 2019-01-17 PROCEDURE — 96372 THER/PROPH/DIAG INJ SC/IM: CPT

## 2019-01-17 PROCEDURE — 72170 X-RAY EXAM OF PELVIS: CPT

## 2019-01-17 PROCEDURE — 85007 BL SMEAR W/DIFF WBC COUNT: CPT

## 2019-01-17 PROCEDURE — 85027 COMPLETE CBC AUTOMATED: CPT

## 2019-01-17 PROCEDURE — 51702 INSERT TEMP BLADDER CATH: CPT

## 2019-01-17 PROCEDURE — 85025 COMPLETE CBC W/AUTO DIFF WBC: CPT

## 2019-01-17 PROCEDURE — 80053 COMPREHEN METABOLIC PANEL: CPT

## 2019-01-17 PROCEDURE — 94760 N-INVAS EAR/PLS OXIMETRY 1: CPT

## 2019-01-17 PROCEDURE — 36415 COLL VENOUS BLD VENIPUNCTURE: CPT

## 2019-01-17 PROCEDURE — 73502 X-RAY EXAM HIP UNI 2-3 VIEWS: CPT

## 2019-01-17 PROCEDURE — 96375 TX/PRO/DX INJ NEW DRUG ADDON: CPT

## 2019-01-17 PROCEDURE — 96374 THER/PROPH/DIAG INJ IV PUSH: CPT

## 2019-01-17 PROCEDURE — 81000 URINALYSIS NONAUTO W/SCOPE: CPT

## 2019-01-17 PROCEDURE — 71045 X-RAY EXAM CHEST 1 VIEW: CPT

## 2019-01-17 PROCEDURE — 80048 BASIC METABOLIC PNL TOTAL CA: CPT

## 2019-01-17 RX ADMIN — FENTANYL CITRATE PRN MCG: 50 INJECTION, SOLUTION INTRAMUSCULAR; INTRAVENOUS at 21:42

## 2019-01-17 NOTE — NUR
pt notified of Dr orders to restart meds that she had asked for but one of meds was not 
carried by hospital and would have to have family member bring med in the bottle for her to 
use her own, attempted to reach son,  message left on phone,

## 2019-01-17 NOTE — CONSULTATION
History of Present Illness


History of Present Illness


Patient Consulted On(magaly/time)


1/17/19


 18:40


Date Seen by Provider:  Jan 17, 2019


Time Seen by Provider:  18:40


History of Present Illness


Ms. Griggs is a 72 yo female with widely metastatic small cell lung cancer who 

is currently undergoing palliative chemotherapy and radiation for pelvic bone 

disease.  She was admitted this evening with a displaced right subcapital hip 

fracture.  Patient reports rapidly progressive pelvic and hip pain, which has 

restricted her almost exclusively to a chair.  Her chronic opiate regimen has 

not been successful in treating her pain.  She recently started on palliative 

radiation but felt she was getting worse and hip pain radiated down to her 

ankle.  She does not recall any trauma incident or a sudden pain or sensation 

that her bone had fractured.





Allergies and Home Medications


Allergies


Coded Allergies:  


     lisinopril (Verified  Allergy, Unknown, 1/9/19)





Home Medications


Amlodipine Besylate 5 Mg Tablet, 5 MG PO DAILY


   Prescribed by: MP SANCHES on 11/4/18 1941


Cyclobenzaprine HCl 10 Mg Tablet, 10 MG PO TID PRN for SPASMS


   Prescribed by: MICHAEL QUEEN on 1/9/19 0842


Ezetimibe 10 Mg Tablet, 10 MG PO DAILY, (Reported)


Fentanyl 1 Each Patch.td72, 75 MCG TD Q72H, (Reported)


Imipramine HCl 50 Mg Tablet, 100 MG PO DAILY, (Reported)


   TAKES 2 (50MG) TABLETS 


Imipramine HCl 50 Mg Tablet, 150 MG PO HS, (Reported)


   TAKES 3 (50MG) TABLETS 


Lansoprazole 15 Mg Capsule.dr, 15 MG PO DAILY, (Reported)


Levothyroxine Sodium 50 Mcg Tablet, 50 MCG PO DAILY, (Reported)


Oxycodone HCl 5 Mg Tablet, 5 MG PO Q6H PRN for PAIN-SEVERE, (Reported)


Potassium Chloride 10 Meq Capsule.er, 10 MEQ PO DAILY, (Reported)





Patient Home Medication List


Home Medication List Reviewed:  Yes





Past Medical-Social-Family Hx


Past Med/Social Hx:  Reviewed Nursing Past Med/Soc Hx


Patient Social History


Alcohol Use:  Denies Use


Recreational Drug Use:  No


Smoking Status:  Former Smoker


2nd Hand Smoke Exposure:  No


Recent Foreign Travel:  No


Contact w/Someone Who Travel:  No


Recent Infectious Disease Expo:  No


Recent Hopitalizations:  Yes





Immunizations Up To Date


Date of Pneumonia Vaccine:  Oct 1, 2017


Date of Influenza Vaccine:  Oct 1, 2018





Seasonal Allergies


Seasonal Allergies:  No





Past Medical History


Surgeries:  Yes (spleenectomy, GB REMOVED)


Abdominal


Respiratory:  Yes


Pneumonia, Chronic Bronchitis, COPD


Currently Using CPAP:  No


Cardiac:  Yes


Hypertension


Neurological:  Yes


Genitourinary:  No


Gastrointestinal:  Yes (GB REMOVED, SPLEEN REMOVED)


Gall Bladder Disease


Musculoskeletal:  Yes (NOSE BROKE X 3)


Degenerate Disk Disease, Arthritis, Fibromyalgia, Chronic Back Pain, Fractures


Endocrine:  Yes (" ONE  TOLD ME I WAS DIABETIC")


HEENT:  Yes


Cancer:  Yes (SMALL CELL CA)


Lung


Did You Recieve Any Treatments:  Yes


What Type of Treatment Did You:  Chemotherapy, Radiation


Psychosocial:  Yes


Anxiety, Depression


Integumentary:  No


Blood Disorders:  No


Adverse Reaction/Blood Tranf:  No





Family Medical History


Reviewed Nursing Family Hx


No Pertinent Family Hx





Review of Systems-General


Constitutional:  No chills, No diaphoresis, No fever; weakness


EENTM:  hearing loss


Respiratory:  no symptoms reported


Gastrointestinal:  no symptoms reported


Genitourinary:  no symptoms reported


Musculoskeletal:  back pain, joint pain, muscle pain, muscle stiffness, muscle 

weakness


Psychiatric/Neurological:  Denies Numbness, Denies Tingling; Weakness





Physical Exam-General Problems


Physical Exam


Vital Signs





Vital Signs - First Documented








 1/17/19 1/17/19





 13:15 16:00


 


Temp 97.2 


 


Pulse 108 


 


Resp 18 


 


B/P (MAP) 149/82 (104) 


 


Pulse Ox 98 


 


O2 Delivery  Room Air





Capillary Refill : Less Than 3 Seconds


General Appearance:  WD/WN, no apparent distress


Eyes:  Bilateral Eye Normal Inspection


HEENT:  normal ENT inspection, pharynx normal


Neck:  full range of motion, normal inspection


Respiratory:  normal breath sounds, no respiratory distress, no accessory 

muscle use


Cardiovascular:  regular rate, rhythm, no edema


Gastrointestinal:  non tender, soft


Back:  normal inspection


Extremities:  No normal range of motion, No non-tender; normal inspection, no 

pedal edema


Neurologic/Psychiatric:  alert, normal mood/affect, oriented x 3


Skin:  normal color, warm/dry





Assessment/Plan


Assessment/Plan


Admission Diagnosis/Plan


72 yo female with metastatic small cell lung cancer with thoracic, hepatic and 

osseous disease was admitted for acute right subcapital hip fracture on 1/17/ 19.  From discussion with orthopedic surgery, there is a good possibility of 

fracture repair given current imaging.  Patient's osseous disease has been 

difficult to identify with x-ray, and pelvic disease was previously only 

identified on MRI from an outside institution, so I am unsure what kind of 

issues might arise during surgery.  We will remain optimistic, however, since 

during the patient's chemotherapy treatment, we have seen significant reduction 

in the size of her thoracic and hepatic disease.  While we are seeing response 

to chemotherapy, I would continue the current course of palliation.  Assuming a 

successful surgery, patient's prognosis is still months of satisfactory quality 

of life.





I did briefly discuss with the patient, the possibility of surgery failure, and 

that the outcome would result in being chair and/or bedbound.  Being bedbound 

is a portentous sign, since patients with poor performance status are also poor 

candidates for chemotherapy.  There is also the possibility of a mixed response 

to chemotherapy, in which soft tissue disease responds well to systemic therapy 

but bony disease progresses.





Regardless of the outcome of the surgery, we will see her back in clinic after 

discharge.  We will defer chemotherapy treatment and other discussions until 

after she heals from surgery.  Thank you allowing me to participate in the care 

of Ms. Griggs.











FILEMON SHERWOOD MD Jan 17, 2019 18:46

## 2019-01-17 NOTE — DIAGNOSTIC IMAGING REPORT
INDICATION: Right hip pain.



TIME OF EXAM: 02:14 p.m.



Two views right hip demonstrate a right hip fracture. This

appears to be subcapital in location. Femoral acetabular

alignment is normal. The rami are intact.



IMPRESSION: Subcapital right hip fracture.



Dictated by: 



  Dictated on workstation # [id number]

## 2019-01-17 NOTE — XMS REPORT
Continuity of Care Document

 Created on: 2019



JESIKA TREJO

External Reference #: [id number]

: 1945

Sex: Female



Demographics







 Address  [address]

[place]  [id number]

 

 Home Phone  [phone] x

 

 Preferred Language  Unknown

 

 Marital Status  Unknown

 

 Amish Affiliation  Unknown

 

 Race  Unknown

 

 Ethnic Group  Unknown





Author







 Author  [facility]

 

 Organization  [facility]

 

 Address  Unknown

 

 Phone  Unavailable



              



Allergies

      





 Active            Description            Code            Type            
Severity            Reaction            Onset            Reported/Identified   
         Relationship to Patient            Clinical Status        

 

 Yes            LISINOPRIL            [id number]            DRUG            N/A  
          N/A                                                            

 

 Yes            PCN (penicillin)            [id number]            CLASS          
  N/A            N/A                                                            

 

 Yes            No Known Drug Allergies            [id number]            Drug 
Allergy            Unknown            N/A                         2018   
                               

 

 Yes            lisinopril            [id number]            Drug Allergy       
     Unknown            N/A                         2019                 
                 



                        



Medications

      



There is no data.                  



Problems

      





 Date Dx Coded            Attending            Type            Code            
Diagnosis            Diagnosed By        

 

 2015            ZONIA JOHNSON, SHIRA ALEXANDER            Ot            V76.12    
                              

 

 10/17/2018            ZONIA JOHNSON, SHIRA ALEXANDER            Ot            V76.12    
        OTH SCREEN MAMMO-MALIGN NEOPLASM OF LARRY                     

 

 10/24/2018            ZONIA JOHNSON, SHIRA ALEXANDER            Ot            V76.12    
        OTH SCREEN MAMMO-MALIGN NEOPLASM OF LARRY                     

 

 10/29/2018            ZONIA JOHNSON, SHIRA ALEXANDER            Ot            V76.12    
        OTH SCREEN MAMMO-MALIGN NEOPLASM OF LARRY                     

 

 2018            ZONIA JOHNSON, SHIRA ALEXANDER            Ot            V76.12    
        OTH SCREEN MAMMO-MALIGN NEOPLASM OF LARRY                     

 

 2018            MP SANCHES MD            Ot            C34.90  
          [facility]                      

 

 2018            MP SANCHES MD            Ot            I10     
       ESSENTIAL (PRIMARY) HYPERTENSION                     

 

 2018            MP SANCHES MD            Ot            K14.8   
         OTHER DISEASES OF TONGUE                     

 

 2018            MP SANCHES MD            Ot            T78.3XXA
            ANGIONEUROTIC EDEMA, INITIAL ENCOUNTER                     

 

 2018            MP SANCHES MD            Ot            Z87.891 
           PERSONAL HISTORY OF NICOTINE DEPENDENCE                     

 

 2018            MP SANCHES MD            Ot            C34.90  
          MALIGNANT NEOPLASM OF UNSP PART OF [facility]                      

 

 2018            MP SANCHES MD, Ot            I10     
       ESSENTIAL (PRIMARY) HYPERTENSION                     

 

 2018            MP SANCHES MD            Ot            K14.8   
         OTHER DISEASES OF TONGUE                     

 

 2018            MP SANCHES MD            Ot            T78.3XXA
            ANGIONEUROTIC EDEMA, INITIAL ENCOUNTER                     

 

 2018            MP SANCHES MD            Ot            Z87.891 
           PERSONAL HISTORY OF NICOTINE DEPENDENCE                     

 

 2018            FILEMON SHERWOOD MD            Ot            C34.12         
   MALIGNANT NEOPLASM OF UPPER LOBE, LEFT B                     

 

 2018            FILEMON SHERWOOD MD            Ot            C78.7          
  SECONDARY MALIG NEOPLASM OF LIVER AND IN                     

 

 2018            FILEMON SHERWOOD MD            Ot            F17.210        
    NICOTINE DEPENDENCE, CIGARETTES, UNCOMPL                     

 

 2018            FILEMON SHERWOOD MD            Ot            I10            
ESSENTIAL (PRIMARY) HYPERTENSION                     

 

 2018            FILEMON SHERWOOD MD            Ot            J44.9          
  CHRONIC OBSTRUCTIVE PULMONARY DISEASE, U                     

 

 2018            FILEMON SHERWOOD MD            Ot            Z79.899        
    OTHER LONG TERM (CURRENT) DRUG THERAPY                     

 

 12/10/2018            FILEMON SHERWOOD MD            Ot            C34.90         
   MALIGNANT NEOPLASM OF UNSP PART OF [facility]                      

 

 12/10/2018            FILEMON SHERWOOD MD            Ot            K76.89         
   OTHER SPECIFIED DISEASES OF LIVER                     

 

 12/10/2018            FILEMON SHERWOOD MD            Ot            R10.9          
  UNSPECIFIED ABDOMINAL PAIN                     

 

 12/10/2018            FILEMON SHERWOOD MD            Ot            R59.0          
  LOCALIZED ENLARGED LYMPH NODES                     

 

 12/10/2018            FILEMON SHERWOOD MD            Ot            Z95.828        
    PRESENCE OF OTHER VASCULAR IMPLANTS AND                      

 

 2018            FILEMON SHERWOOD MD            Ot            C34.12         
   MALIGNANT NEOPLASM OF UPPER LOBE, LEFT B                     

 

 2018            FILEMON SHERWOOD MD            Ot            C78.7          
  SECONDARY MALIG NEOPLASM OF LIVER AND IN                     

 

 2018            FILEMON SHERWOOD MD            Ot            F17.210        
    NICOTINE DEPENDENCE, CIGARETTES, UNCOMPL                     

 

 2018            FILEMON SHERWOOD MD            Ot            I10            
ESSENTIAL (PRIMARY) HYPERTENSION                     

 

 2018            FILEMON SHERWOOD MD            Ot            J44.9          
  CHRONIC OBSTRUCTIVE PULMONARY DISEASE, U                     

 

 2018            FILEMON SHERWOOD MD            Ot            Z79.899        
    OTHER LONG TERM (CURRENT) DRUG THERAPY                     

 

 2018            FILEMON SHERWOOD MD            Ot            C34.90         
   MALIGNANT NEOPLASM OF UNSP PART OF [facility]                      

 

 2018            FILEMON SHERWOOD MD            Ot            K76.89         
   OTHER SPECIFIED DISEASES OF LIVER                     

 

 2018            FILEMON SHERWOOD MD            Ot            R10.9          
  UNSPECIFIED ABDOMINAL PAIN                     

 

 2018            FILEMON SHERWOOD MD            Ot            R59.0          
  LOCALIZED ENLARGED LYMPH NODES                     

 

 2018            FILEMON SHERWOOD MD            Ot            Z95.828        
    PRESENCE OF OTHER VASCULAR IMPLANTS AND                      

 

 2018                         P                        Malignant 
neoplasm of unspecified part of unspecified bronchus or lung                   
  

 

 2019            FILEMON SHERWOOD MD            Ot            C34.12         
   MALIGNANT NEOPLASM OF UPPER LOBE, LEFT B                     

 

 2019            FILEMON SHERWOOD MD            Ot            C78.7          
  SECONDARY MALIG NEOPLASM OF LIVER AND IN                     

 

 2019            FILEMON SHERWOOD MD            Ot            F17.210        
    NICOTINE DEPENDENCE, CIGARETTES, UNCOMPL                     

 

 2019            FILEMON SHERWOOD MD            Ot            I10            
ESSENTIAL (PRIMARY) HYPERTENSION                     

 

 2019            FILEMON SHERWOOD MD            Ot            J44.9          
  CHRONIC OBSTRUCTIVE PULMONARY DISEASE, U                     

 

 2019            FILEMON SHERWOOD MD            Ot            Z79.899        
    OTHER LONG TERM (CURRENT) DRUG THERAPY                     

 

 2019            FILEMON SHERWOOD MD            Ot            C34.12         
   MALIGNANT NEOPLASM OF UPPER LOBE, LEFT B                     

 

 2019            FILEMON SHERWOOD MD            Ot            C78.7          
  SECONDARY MALIG NEOPLASM OF LIVER AND IN                     

 

 2019            FILEMON SHERWOOD MD            Ot            F17.210        
    NICOTINE DEPENDENCE, CIGARETTES, UNCOMPL                     

 

 2019            FILEMON SHERWOOD MD            Ot            I10            
ESSENTIAL (PRIMARY) HYPERTENSION                     

 

 2019            FILEMON SHERWOOD MD            Ot            J44.9          
  CHRONIC OBSTRUCTIVE PULMONARY DISEASE, U                     

 

 2019            FILEMON SHREWOOD MD            Ot            Z79.899        
    OTHER LONG TERM (CURRENT) DRUG THERAPY                     

 

 2019            ZONIA JOHNSON, SHIRA ALEXANDER            Ot            V76.12    
        OTH SCREEN MAMMO-MALIGN NEOPLASM OF LARRY                     

 

 2019            FILEMON SHERWOOD MD            Ot            C34.12         
   MALIGNANT NEOPLASM OF UPPER LOBE, LEFT B                     

 

 2019            FILEMON SHERWOOD MD            Ot            C78.7          
  SECONDARY MALIG NEOPLASM OF LIVER AND IN                     

 

 2019            FILEMON SHERWOOD MD            Ot            F17.210        
    NICOTINE DEPENDENCE, CIGARETTES, UNCOMPL                     

 

 2019            FILEMON SHERWOOD MD            Ot            I10            
ESSENTIAL (PRIMARY) HYPERTENSION                     

 

 2019            FILEMON SHERWOOD MD            Ot            J44.9          
  CHRONIC OBSTRUCTIVE PULMONARY DISEASE, U                     

 

 2019            FILEMON SHERWOOD MD            Ot            Z79.899        
    OTHER LONG TERM (CURRENT) DRUG THERAPY                     

 

 2019            FILEMON SHERWOOD MD            Ot            C34.90         
   MALIGNANT NEOPLASM OF UNSP PART OF [facility]                      

 

 2019            FILEMON SHERWOOD MD, Ot            K76.89         
   OTHER SPECIFIED DISEASES OF LIVER                     

 

 2019            FILEMON SHERWOOD MD, Ot            R10.9          
  UNSPECIFIED ABDOMINAL PAIN                     

 

 2019            FILEMON SHERWOOD MD, Ot            R59.0          
  LOCALIZED ENLARGED LYMPH NODES                     

 

 2019            FILEMON SHERWOOD MD, Ot            Z95.828        
    PRESENCE OF OTHER VASCULAR IMPLANTS AND                      

 

 2019            MICHAEL ZALDIVAR MD, Ot            C34.90
            M[facility]IGNA[facility]LASM [facility]                      

 

 2019            MICHAEL ZALDIVAR MD, Ot            I10   
         ESSENTIAL (PRIMARY) HYPERTENSION                     

 

 2019            MICHAEL ZALDIVAR MD, Ot            
M25.551            PAIN IN RIGHT HIP                     

 

 2019            MICHAEL ZALDIVAR MD, Ot            
M62.831            MUSCLE SPASM OF CALF                     

 

 2019            MICHAEL ZALDIVAR MD, Ot            
Z87.891            PERSONAL HISTORY OF NICOTINE DEPENDENCE                     

 

 2019            MICHAEL ZALDIVAR MD, Ot            Z90.81
            ACQUIRED ABSENCE OF SPLEEN                     

 

 2019            MICHAEL ZALDIVAR MD, Ot            
Z98.890            OTHER SPECIFIED POSTPROCEDURAL STATES                     



                                                                               
                                                                           



Procedures

      



There is no data.                  



Results

      





 Test            Result            Range        









 Complete blood count (CBC) with automated white blood cell (WBC) differential 
- 18 19:09         









 Blood leukocytes automated count (number/volume)            8.1 10*3/uL       
     4.3-11.0        

 

 Blood erythrocytes automated count (number/volume)            3.91 10*6/uL    
        4.35-5.85        

 

 Venous blood hemoglobin measurement (mass/volume)            10.7 g/dL        
    11.5-16.0        

 

 Blood hematocrit (volume fraction)            33 %            35-52        

 

 Automated erythrocyte mean corpuscular volume            83 [foz_us]          
  80-99        

 

 Automated erythrocyte mean corpuscular hemoglobin (mass per erythrocyte)      
      27 pg            25-34        

 

 Automated erythrocyte mean corpuscular hemoglobin concentration measurement (
mass/volume)            33 g/dL            32-36        

 

 Automated erythrocyte distribution width ratio            14.9 %            
10.0-14.5        

 

 Automated blood platelet count (count/volume)            342 10*3/uL          
  130-400        

 

 Automated blood platelet mean volume measurement            9.5 [foz_us]      
      7.4-10.4        

 

 Automated blood neutrophils/100 leukocytes            82 %            42-75   
     

 

 Automated blood lymphocytes/100 leukocytes            17 %            12-44   
     

 

 Blood monocytes/100 leukocytes            1 %            0-12        

 

 Automated blood eosinophils/100 leukocytes            0 %            0-10     
   

 

 Automated blood basophils/100 leukocytes            0 %            0-10        

 

 Blood neutrophils automated count (number/volume)            6.6 10*3         
   1.8-7.8        

 

 Blood lymphocytes automated count (number/volume)            1.4 10*3         
   1.0-4.0        

 

 Blood monocytes automated count (number/volume)            0.0 10*3            
0.0-1.0        

 

 Automated eosinophil count            0.0 10*3/uL            0.0-0.3        

 

 Automated blood basophil count (count/volume)            0.0 10*3/uL          
  0.0-0.1        









 Comprehensive metabolic panel - 18 19:09         









 Serum or plasma sodium measurement (moles/volume)            130 mmol/L       
     135-145        

 

 Serum or plasma potassium measurement (moles/volume)            4.7 mmol/L    
        3.6-5.0        

 

 Serum or plasma chloride measurement (moles/volume)            98 mmol/L      
              

 

 Carbon dioxide            21 mmol/L            21-32        

 

 Serum or plasma anion gap determination (moles/volume)            11 mmol/L   
         5-14        

 

 Serum or plasma urea nitrogen measurement (mass/volume)            15 mg/dL   
         7-18        

 

 Serum or plasma creatinine measurement (mass/volume)            1.06 mg/dL    
        0.60-1.30        

 

 Serum or plasma urea nitrogen/creatinine mass ratio            14             
NRG        

 

 Serum or plasma creatinine measurement with calculation of estimated 
glomerular filtration rate            51             NRG        

 

 Serum or plasma glucose measurement (mass/volume)            113 mg/dL        
            

 

 Serum or plasma calcium measurement (mass/volume)            9.4 mg/dL        
    8.5-10.1        

 

 Serum or plasma total bilirubin measurement (mass/volume)            0.4 mg/dL
            0.1-1.0        

 

 Serum or plasma alkaline phosphatase measurement (enzymatic activity/volume)  
          533 U/L                    

 

 Serum or plasma aspartate aminotransferase measurement (enzymatic activity/
volume)            19 U/L            5-34        

 

 Serum or plasma alanine aminotransferase measurement (enzymatic activity/volume
)            17 U/L            0-55        

 

 Serum or plasma protein measurement (mass/volume)            7.4 g/dL         
   6.4-8.2        

 

 Serum or plasma albumin measurement (mass/volume)            3.7 g/dL         
   3.2-4.5        

 

 CALCIUM CORRECTED            9.6 mg/dL            8.5-10.1        



                  



Encounters

      





 ACCT No.            Visit Date/Time            Discharge            Status    
        Pt. Type            Provider            Facility            Loc./Unit  
          Complaint        

 

 [id number]            2019 08:55:00            2019 23:59:59    
        CLS            Outpatient            FILEMON SHERWOOD MD            [facility]            ONC                     

 

 [id number]            2019 07:27:00            2019 08:42:00    
        DIS            Outpatient            ZEN JOHNSON, MICHAEL FIGUEROA          
  [facility]            ER            LEG PAIN;TROUBLE 
WALKING        

 

 [id number]            2018 12:23:00            2018 23:59:59    
        CLS            Outpatient            FILEMON SHERWOOD MD            [facility]            RAD            LUNG CANCER        

 

 [id number]            2018 17:24:00            2018 20:18:00    
        DIS            Emergency            MP SANCHES MD            
[facility]            ER            POST CHEMO PROBLEMS  
      

 

 [id number]            2015 15:11:00            2015 23:59:59    
        CLS            Outpatient            SHIRA BRAR MD            
[facility]            RAD            SCREENING        

 

 [id number]            2019 11:38:00                         ACT       
     Emergency            MP SANCHES MD            [facility]            ER            R HIP PAIN        

 

 [id number]            2018 08:26:28                                      
Document Registration                                                          
  

 

 [id number]            2018 11:26:39                                      
Document Registration                                                          
  

 

 [id number]            2018 11:44:49                                      
Document Registration                                                          
  

 

 [id number]            2018 08:46:34                                      
Document Registration                                                          
  

 

 [id number]            2018 12:39:06                                      
Document Registration                                                          
  

 

 [id number]            2018 01:08:08                                      
Document Registration                                                          
  

 

 [id number]            2018 01:00:47                                      
Document Registration                                                          
  

 

 [id number]            10/15/2018 09:50:10                                      
Document Registration                                                          
  

 

 [id number]            10/07/2018 22:31:06                                      
Document Registration                                                          
  

 

 [id number]            10/01/2018 19:18:54                                      
Document Registration                                                          
  

 

 [id number]            10/01/2018 19:13:05                                      
Document Registration                                                          
  

 

 [id number]            2018 10:37:11                                      
Document Registration                                                          
  

 

 [id number]            2018 17:09:34            2018 23:59:59          
  CLS            Outpatient            DANICA Palma                        
                       

 

 [id number]            10/10/2018 09:49:55            10/10/2018 23:59:59          
  CLS            Outpatient            Shira Brar                         
                      

 

 [id number]            2018 18:06:28            2018 23:59:59          
  CLS            Outpatient            Val Cole                        
                       

 

 [id number]            2018 09:35:39            2018 23:59:59          
  CLS            Outpatient            Talyadouglassobia Shira                         
                      

 

 [id number]            10/26/2016 18:01:57            10/26/2016 23:59:59          
  CLS            Outpatient            TressaSaqib                       
                        

 

 [id number]            2016 20:09:20            2016 23:59:59          
  CLS            Outpatient            BustillosSaqib                       
                        

 

 [id number]            2016 10:48:55            2016 23:59:59          
  CLS            Outpatient            TalyaShira loomis                         
                      

 

 [id number]            2015 18:29:45            2015 23:59:59          
  CLS            Outpatient            Erlin Correaul                             
                  

 

 [id number]            2015 16:11:21            2015 23:59:59          
  CLS            Outpatient            Javier Preston                           
                    

 

 [id number]            2015 07:25:45            2015 23:59:59          
  CLS            Outpatient            Javier Preston                           
                    

 

 [id number]            2015 20:34:53            2015 23:59:59          
  CLS            Outpatient            Javier Preston                           
                    

 

 [id number]            2015 14:28:09            2015 23:59:59          
  CLS            Outpatient            Javier Preston                           
                    

 

 [id number]            2014 14:31:47            2014 23:59:59          
  CLS            Outpatient            Javier Preston                           
                    

 

 [id number]            2014 10:13:56            2014 23:59:59          
  CLS            Outpatient            TalyadouglassobiaShira                         
                      

 

 [id number]            2014 18:03:10            2014 23:59:59          
  CLS            Outpatient            Rosamaria Maria                              
                 

 

 [id number]            2014 18:11:59            2014 23:59:59          
  CLS            Outpatient            Sugey Hawkins                     
                          

 

 [id number]            10/13/2014 11:38:15            10/13/2014 23:59:59          
  CLS            Outpatient            Shira Brar                         
                      

 

 [id number]            10/06/2014 16:19:02            10/06/2014 23:59:59          
  CLS            Outpatient            Shira Brar

## 2019-01-17 NOTE — XMS REPORT
Continuity of Care Document

 Created on: 2019



JESIKA TREJO

External Reference #: [id number]

: 1945

Sex: Female



Demographics







 Address  [address]

[place]  [id number]

 

 Home Phone  [phone] x

 

 Preferred Language  Unknown

 

 Marital Status  Unknown

 

 Roman Catholic Affiliation  Unknown

 

 Race  Unknown

 

 Ethnic Group  Unknown





Author







 Author  [facility]

 

 Organization  [facility]

 

 Address  Unknown

 

 Phone  Unavailable



              



Allergies

      





 Active            Description            Code            Type            
Severity            Reaction            Onset            Reported/Identified   
         Relationship to Patient            Clinical Status        

 

 Yes            LISINOPRIL            [id number]            DRUG            N/A  
          N/A                                                            

 

 Yes            PCN (penicillin)            [id number]            CLASS          
  N/A            N/A                                                            

 

 Yes            No Known Drug Allergies            [id number]            Drug 
Allergy            Unknown            N/A                         2018   
                               

 

 Yes            lisinopril            [id number]            Drug Allergy       
     Unknown            N/A                         2019                 
                 



                        



Medications

      



There is no data.                  



Problems

      





 Date Dx Coded            Attending            Type            Code            
Diagnosis            Diagnosed By        

 

 2015            LUZ MARINA JOHNSON, SHIRA ALEXANDER            Ot            V76.12    
                              

 

 10/17/2018            LUZ MARINA JOHNSON, SHIRA ALEXANDER            Ot            V76.12    
        OTH SCREEN MAMMO-MALIGN NEOPLASM OF LARRY                     

 

 10/24/2018            LUZ MARINA JOHNSON, SHIRA ALEXANDER            Ot            V76.12    
        OTH SCREEN MAMMO-MALIGN NEOPLASM OF LARRY                     

 

 10/29/2018            LUZ MARINA JOHNSON, SHIRA ALEXANDER            Ot            V76.12    
        OTH SCREEN MAMMO-MALIGN NEOPLASM OF LARRY                     

 

 2018            LUZ MARINA JOHNSON, SHIRA ALEXANDER            Ot            V76.12    
        OTH SCREEN MAMMO-MALIGN NEOPLASM OF LARRY                     

 

 2018            MP SANCHES MD            Ot            C34.90  
          [facility]                      

 

 2018            MP SANCHES MD            Ot            I10     
       ESSENTIAL (PRIMARY) HYPERTENSION                     

 

 2018            MP SANCHES MD            Ot            K14.8   
         OTHER DISEASES OF TONGUE                     

 

 2018            MP SANCHES MD            Ot            T78.3XXA
            ANGIONEUROTIC EDEMA, INITIAL ENCOUNTER                     

 

 2018            MP SANCHES MD            Ot            Z87.891 
           PERSONAL HISTORY OF NICOTINE DEPENDENCE                     

 

 2018            MP SANCHES MD            Ot            C34.90  
          MALIGNANT NEOPLASM OF UNSP PART OF [facility]                      

 

 2018            MP SANCHES MD, Ot            I10     
       ESSENTIAL (PRIMARY) HYPERTENSION                     

 

 2018            MP SANCHES MD            Ot            K14.8   
         OTHER DISEASES OF TONGUE                     

 

 2018            MP SANCHES MD            Ot            T78.3XXA
            ANGIONEUROTIC EDEMA, INITIAL ENCOUNTER                     

 

 2018            MP SANCHES MD            Ot            Z87.891 
           PERSONAL HISTORY OF NICOTINE DEPENDENCE                     

 

 2018            FILEMON SHERWOOD MD            Ot            C34.12         
   MALIGNANT NEOPLASM OF UPPER LOBE, LEFT B                     

 

 2018            FILEMON SHERWOOD MD            Ot            C78.7          
  SECONDARY MALIG NEOPLASM OF LIVER AND IN                     

 

 2018            FILEMON SHERWOOD MD            Ot            F17.210        
    NICOTINE DEPENDENCE, CIGARETTES, UNCOMPL                     

 

 2018            FILEMON SHERWOOD MD            Ot            I10            
ESSENTIAL (PRIMARY) HYPERTENSION                     

 

 2018            FILEMON SHERWOOD MD            Ot            J44.9          
  CHRONIC OBSTRUCTIVE PULMONARY DISEASE, U                     

 

 2018            FILEMON SHERWOOD MD            Ot            Z79.899        
    OTHER LONG TERM (CURRENT) DRUG THERAPY                     

 

 12/10/2018            FILEMON SHERWOOD MD            Ot            C34.90         
   MALIGNANT NEOPLASM OF UNSP PART OF UNSP                      

 

 12/10/2018            FILEMON SHERWOOD MD            Ot            K76.89         
   OTHER SPECIFIED DISEASES OF LIVER                     

 

 12/10/2018            FILEMON SHERWOOD MD            Ot            R10.9          
  UNSPECIFIED ABDOMINAL PAIN                     

 

 12/10/2018            FILEMON SHERWOOD MD            Ot            R59.0          
  LOCALIZED ENLARGED LYMPH NODES                     

 

 12/10/2018            FILEMON SHERWOOD MD            Ot            Z95.828        
    PRESENCE OF OTHER VASCULAR IMPLANTS AND                      

 

 2018            FILEMON SHERWOOD MD            Ot            C34.12         
   MALIGNANT NEOPLASM OF UPPER LOBE, LEFT B                     

 

 2018            FILEMON SHERWOOD MD            Ot            C78.7          
  SECONDARY MALIG NEOPLASM OF LIVER AND IN                     

 

 2018            FILEMON SHERWOOD MD            Ot            F17.210        
    NICOTINE DEPENDENCE, CIGARETTES, UNCOMPL                     

 

 2018            FILEMON SHERWOOD MD            Ot            I10            
ESSENTIAL (PRIMARY) HYPERTENSION                     

 

 2018            FILEMON SHERWOOD MD            Ot            J44.9          
  CHRONIC OBSTRUCTIVE PULMONARY DISEASE, U                     

 

 2018            FILEMON SHERWOOD MD            Ot            Z79.899        
    OTHER LONG TERM (CURRENT) DRUG THERAPY                     

 

 2018            FILEMON SHERWOOD MD            Ot            C34.90         
   MALIGNANT NEOPLASM OF UNSP PART OF UNSP                      

 

 2018            FILEMON SHERWOOD MD            Ot            K76.89         
   OTHER SPECIFIED DISEASES OF LIVER                     

 

 2018            FILEMON SHERWOOD MD            Ot            R10.9          
  UNSPECIFIED ABDOMINAL PAIN                     

 

 2018            FILEMON SHERWOOD MD            Ot            R59.0          
  LOCALIZED ENLARGED LYMPH NODES                     

 

 2018            FILEMON SHERWOOD MD            Ot            Z95.828        
    PRESENCE OF OTHER VASCULAR IMPLANTS AND                      

 

 2018                         P                        Malignant 
neoplasm of unspecified part of unspecified bronchus or lung                   
  

 

 2019            FILEMON SHERWOOD MD            Ot            C34.12         
   MALIGNANT NEOPLASM OF UPPER LOBE, LEFT B                     

 

 2019            FILEMON SHERWOOD MD            Ot            C78.7          
  SECONDARY MALIG NEOPLASM OF LIVER AND IN                     

 

 2019            FILEMON SHERWOOD MD            Ot            F17.210        
    NICOTINE DEPENDENCE, CIGARETTES, UNCOMPL                     

 

 2019            FILEMON SHERWOOD MD            Ot            I10            
ESSENTIAL (PRIMARY) HYPERTENSION                     

 

 2019            FILEMON SHERWOOD MD            Ot            J44.9          
  CHRONIC OBSTRUCTIVE PULMONARY DISEASE, U                     

 

 2019            FILEMON SHERWOOD MD            Ot            Z79.899        
    OTHER LONG TERM (CURRENT) DRUG THERAPY                     

 

 2019            FILEMON SHERWOOD MD            Ot            C34.12         
   MALIGNANT NEOPLASM OF UPPER LOBE, LEFT B                     

 

 2019            FILEMON SHERWOOD MD            Ot            C78.7          
  SECONDARY MALIG NEOPLASM OF LIVER AND IN                     

 

 2019            FILEMON SHERWOOD MD            Ot            F17.210        
    NICOTINE DEPENDENCE, CIGARETTES, UNCOMPL                     

 

 2019            FILEMON SHERWOOD MD            Ot            I10            
ESSENTIAL (PRIMARY) HYPERTENSION                     

 

 2019            FILEMON SHERWOOD MD            Ot            J44.9          
  CHRONIC OBSTRUCTIVE PULMONARY DISEASE, U                     

 

 2019            FILEMON SHERWOOD MD            Ot            Z79.899        
    OTHER LONG TERM (CURRENT) DRUG THERAPY                     

 

 2019            LUZ MARINA JOHNSON, SHIRA ALEXANDER            Ot            V76.12    
        OTH SCREEN MAMMO-MALIGN NEOPLASM OF LARRY                     

 

 2019            FILEMON SHERWOOD MD            Ot            C34.12         
   MALIGNANT NEOPLASM OF UPPER LOBE, LEFT B                     

 

 2019            FILEMON SHERWOOD MD            Ot            C78.7          
  SECONDARY MALIG NEOPLASM OF LIVER AND IN                     

 

 2019            FILEMON SHERWOOD MD            Ot            F17.210        
    NICOTINE DEPENDENCE, CIGARETTES, UNCOMPL                     

 

 2019            FILEMON SHERWOOD MD            Ot            I10            
ESSENTIAL (PRIMARY) HYPERTENSION                     

 

 2019            FILEMON SHERWOOD MD            Ot            J44.9          
  CHRONIC OBSTRUCTIVE PULMONARY DISEASE, U                     

 

 2019            FILEMON SHERWOOD MD            Ot            Z79.899        
    OTHER LONG TERM (CURRENT) DRUG THERAPY                     

 

 2019            FILEMON SHERWOOD MD            Ot            C34.90         
   MALIGNANT NEOPLASM OF UNSP PART OF [facility]                      

 

 2019            FILEMON SHERWOOD MD, Ot            K76.89         
   OTHER SPECIFIED DISEASES OF LIVER                     

 

 2019            FILEMON SHERWOOD MD, Ot            R10.9          
  UNSPECIFIED ABDOMINAL PAIN                     

 

 2019            FILEMON SHERWOOD MD, Ot            R59.0          
  LOCALIZED ENLARGED LYMPH NODES                     

 

 2019            FILEMON SHERWOOD MD, Ot            Z95.828        
    PRESENCE OF OTHER VASCULAR IMPLANTS AND                      

 

 2019            MICHAEL ZALDIVAR MD, Ot            C34.90
            [facility]                      

 

 2019            MICHAEL ZALDIVAR MD, Ot            I10   
         ESSENTIAL (PRIMARY) HYPERTENSION                     

 

 2019            MICHAEL ZALDIVAR MD, Ot            
M25.551            PAIN IN RIGHT HIP                     

 

 2019            MICHAEL ZALDIVAR MD, Ot            
M62.831            MUSCLE SPASM OF CALF                     

 

 2019            MICHAEL ZALDIVAR MD, Ot            
Z87.891            PERSONAL HISTORY OF NICOTINE DEPENDENCE                     

 

 2019            MICHAEL ZALDIVAR MD, Ot            Z90.81
            ACQUIRED ABSENCE OF SPLEEN                     

 

 2019            MICHAEL ZALDIVAR MD, Ot            
Z98.890            OTHER SPECIFIED POSTPROCEDURAL STATES                     



                                                                               
                                                                           



Procedures

      



There is no data.                  



Results

      





 Test            Result            Range        









 Complete blood count (CBC) with automated white blood cell (WBC) differential 
- 18 19:09         









 Blood leukocytes automated count (number/volume)            8.1 10*3/uL       
     4.3-11.0        

 

 Blood erythrocytes automated count (number/volume)            3.91 10*6/uL    
        4.35-5.85        

 

 Venous blood hemoglobin measurement (mass/volume)            10.7 g/dL        
    11.5-16.0        

 

 Blood hematocrit (volume fraction)            33 %            35-52        

 

 Automated erythrocyte mean corpuscular volume            83 [foz_us]          
  80-99        

 

 Automated erythrocyte mean corpuscular hemoglobin (mass per erythrocyte)      
      27 pg            25-34        

 

 Automated erythrocyte mean corpuscular hemoglobin concentration measurement (
mass/volume)            33 g/dL            32-36        

 

 Automated erythrocyte distribution width ratio            14.9 %            
10.0-14.5        

 

 Automated blood platelet count (count/volume)            342 10*3/uL          
  130-400        

 

 Automated blood platelet mean volume measurement            9.5 [foz_us]      
      7.4-10.4        

 

 Automated blood neutrophils/100 leukocytes            82 %            42-75   
     

 

 Automated blood lymphocytes/100 leukocytes            17 %            12-44   
     

 

 Blood monocytes/100 leukocytes            1 %            0-12        

 

 Automated blood eosinophils/100 leukocytes            0 %            0-10     
   

 

 Automated blood basophils/100 leukocytes            0 %            0-10        

 

 Blood neutrophils automated count (number/volume)            6.6 10*3         
   1.8-7.8        

 

 Blood lymphocytes automated count (number/volume)            1.4 10*3         
   1.0-4.0        

 

 Blood monocytes automated count (number/volume)            0.0 10*3            
0.0-1.0        

 

 Automated eosinophil count            0.0 10*3/uL            0.0-0.3        

 

 Automated blood basophil count (count/volume)            0.0 10*3/uL          
  0.0-0.1        









 Comprehensive metabolic panel - 18 19:09         









 Serum or plasma sodium measurement (moles/volume)            130 mmol/L       
     135-145        

 

 Serum or plasma potassium measurement (moles/volume)            4.7 mmol/L    
        3.6-5.0        

 

 Serum or plasma chloride measurement (moles/volume)            98 mmol/L      
              

 

 Carbon dioxide            21 mmol/L            21-32        

 

 Serum or plasma anion gap determination (moles/volume)            11 mmol/L   
         5-14        

 

 Serum or plasma urea nitrogen measurement (mass/volume)            15 mg/dL   
         7-18        

 

 Serum or plasma creatinine measurement (mass/volume)            1.06 mg/dL    
        0.60-1.30        

 

 Serum or plasma urea nitrogen/creatinine mass ratio            14             
NRG        

 

 Serum or plasma creatinine measurement with calculation of estimated 
glomerular filtration rate            51             NRG        

 

 Serum or plasma glucose measurement (mass/volume)            113 mg/dL        
            

 

 Serum or plasma calcium measurement (mass/volume)            9.4 mg/dL        
    8.5-10.1        

 

 Serum or plasma total bilirubin measurement (mass/volume)            0.4 mg/dL
            0.1-1.0        

 

 Serum or plasma alkaline phosphatase measurement (enzymatic activity/volume)  
          533 U/L                    

 

 Serum or plasma aspartate aminotransferase measurement (enzymatic activity/
volume)            19 U/L            5-34        

 

 Serum or plasma alanine aminotransferase measurement (enzymatic activity/volume
)            17 U/L            0-55        

 

 Serum or plasma protein measurement (mass/volume)            7.4 g/dL         
   6.4-8.2        

 

 Serum or plasma albumin measurement (mass/volume)            3.7 g/dL         
   3.2-4.5        

 

 CALCIUM CORRECTED            9.6 mg/dL            8.5-10.1        



                  



Encounters

      





 ACCT No.            Visit Date/Time            Discharge            Status    
        Pt. Type            Provider            Facility            Loc./Unit  
          Complaint        

 

 [id number]            2019 08:55:00            2019 23:59:59    
        CLS            Outpatient            FILEMON SHERWOOD MD            [facility]            ONC                     

 

 [id number]            2019 07:27:00            2019 08:42:00    
        DIS            Outpatient            ZEN JOHNSON, MICHAEL FIGUEROA          
  [facility]            ER            LEG PAIN;TROUBLE 
WALKING        

 

 [id number]            2018 12:23:00            2018 23:59:59    
        CLS            Outpatient            FILEMON SHERWOOD MD            [facility]            RAD            LUNG CANCER        

 

 [id number]            2018 17:24:00            2018 20:18:00    
        DIS            Emergency            MYRON JOHNSON, MP [place]            
[facility]            ER            POST CHEMO PROBLEMS  
      

 

 [id number]            2015 15:11:00            2015 23:59:59    
        CLS            Outpatient            LUZ MARINA JOHNSON, SHIRA ALEXANDER            
[facility]            RAD            SCREENING        

 

 [id number]            2019 14:47:00                         ACT       
     Inpatient            MARA JOHNSON, ARMANDO HAGEN            [facility]            4TH            SUBCAPITAL FRACTURE OF R HIP;LUNG CA WITH 
METS        

 

 [id number]            2018 08:26:28                                      
Document Registration                                                          
  

 

 [id number]            2018 11:26:39                                      
Document Registration                                                          
  

 

 [id number]            2018 11:44:49                                      
Document Registration                                                          
  

 

 [id number]            2018 08:46:34                                      
Document Registration                                                          
  

 

 [id number]            2018 12:39:06                                      
Document Registration                                                          
  

 

 [id number]            2018 01:08:08                                      
Document Registration                                                          
  

 

 [id number]            2018 01:00:47                                      
Document Registration                                                          
  

 

 [id number]            10/15/2018 09:50:10                                      
Document Registration                                                          
  

 

 [id number]            10/07/2018 22:31:06                                      
Document Registration                                                          
  

 

 [id number]            10/01/2018 19:18:54                                      
Document Registration                                                          
  

 

 [id number]            10/01/2018 19:13:05                                      
Document Registration                                                          
  

 

 [id number]            2018 10:37:11                                      
Document Registration                                                          
  

 

 [id number]            2018 17:09:34            2018 23:59:59          
  CLS            Outpatient            DANICA Palma                        
                       

 

 [id number]            10/10/2018 09:49:55            10/10/2018 23:59:59          
  CLS            Outpatient            Shira Raza                         
                      

 

 [id number]            2018 18:06:28            2018 23:59:59          
  CLS            Outpatient            Val Cole                        
                       

 

 [id number]            2018 09:35:39            2018 23:59:59          
  CLS            Outpatient            Shira Raza                         
                      

 

 [id number]            10/26/2016 18:01:57            10/26/2016 23:59:59          
  CLS            Outpatient            Tressa Saqib                       
                        

 

 [id number]            2016 20:09:20            2016 23:59:59          
  CLS            Outpatient            BustillosSaqib                       
                        

 

 [id number]            2016 10:48:55            2016 23:59:59          
  CLS            Outpatient            Shira Raza                         
                      

 

 [id number]            2015 18:29:45            2015 23:59:59          
  CLS            Outpatient            Carley Correa                             
                  

 

 [id number]            2015 16:11:21            2015 23:59:59          
  CLS            Outpatient            Javier Preston                           
                    

 

 [id number]            2015 07:25:45            2015 23:59:59          
  CLS            Outpatient            Javier Preston                           
                    

 

 [id number]            2015 20:34:53            2015 23:59:59          
  CLS            Outpatient            Javier Preston                           
                    

 

 [id number]            2015 14:28:09            2015 23:59:59          
  CLS            Outpatient            Javier Preston                           
                    

 

 [id number]            2014 14:31:47            2014 23:59:59          
  CLS            Outpatient            Javier Preston                           
                    

 

 [id number]            2014 10:13:56            2014 23:59:59          
  CLS            Outpatient            Luz Marina Shira                         
                      

 

 [id number]            2014 18:03:10            2014 23:59:59          
  CLS            Outpatient            Rosamaria Maria                              
                 

 

 [id number]            2014 18:11:59            2014 23:59:59          
  CLS            Outpatient            Sugey Hawkins                     
                          

 

 [id number]            10/13/2014 11:38:15            10/13/2014 23:59:59          
  CLS            Outpatient            TalyaShira loomis                         
                      

 

 [id number]            10/06/2014 16:19:02            10/06/2014 23:59:59          
  CLS            Outpatient            Hetlinger, Shira

## 2019-01-17 NOTE — NUR
Dr Tse notified of pt concerns of home meds not restarted.  orders received for pain meds 
and antidepressant.

## 2019-01-17 NOTE — HISTORY & PHYSICAL-HOSPITALIST
History of Present Illness


HPI/Chief Complaint


Pt is a 73yoCF with a PMH of HTN and metastatic lung cancer to liver and pelvis 

who presented with worsening hip pain. She states she has always had hip pain 

since the 70s but over the past few weeks it has gotten a lot worse. She was 

diagnosed with lung cancer in September of this year and has completed 5 cycles 

of chemo there and is scheduled for her next cycle next week. Because of her 

worsening hip pain MRI was done of pelvis which revealed metastatic disease. 

She was referred to Dr Gleason to palliative rain but before she could make that 

appointment was seen in the ER on 1/9 plain films were done and negative. She 

has been using a fentanyl patch at home but has still had severe pain. She has 

been in such pain that she has not been able to bend to sit on the toilet and 

has been urinating in a trash can even. Imaging today reveals a displaced right 

subcapital hip fracture. She is to be admitted for operative repair.


Source:  patient


Date Seen


1/17/19


Time Seen by a Provider:  15:30


Attending Physician


Armando Terry MD


PCP


Navin Raza MD


Referring Physician





Date of Admission


Jan 17, 2019 at 14:47





Home Medications & Allergies


Home Medications


Reviewed patient Home Medication Reconciliation performed by pharmacy 

medication reconciliations technician and/or nursing.


Patients Allergies have been reviewed.





Allergies





Allergies


Coded Allergies


  lisinopril (Verified Allergy, Severe, angioedema, 1/18/19)








Past Medical-Social-Family Hx


Past Med/Social Hx:  Reviewed Nursing Past Med/Soc Hx


Patient Social History


Employed/Student:  retired


Alcohol Use:  Denies Use


Recreational Drug Use:  No


Smoking Status:  Former Smoker


2nd Hand Smoke Exposure:  No


Recent Foreign Travel:  No


Contact w/other who traveled:  No


Recent Hopitalizations:  Yes


Recent Infectious Disease Expo:  No





Seasonal Allergies


Seasonal Allergies:  No





Past Medical History


Surgeries:  Abdominal


Cardiac:  Hypertension


Cancer:  Lung (metastatic disease to liver and bones)


Did You Recieve Any Treatments:  Yes


What Type of Treatment Did You:  Chemotherapy





Family History


Reviewed Nursing Family Hx


No Pertinent Family Hx





Review of Systems


Constitutional:  No chills, No fever


Respiratory:  No dyspnea on exertion, No short of breath


Cardiovascular:  No chest pain


Musculoskeletal:  joint pain


All Other Systems Reviewed


Negative Unless Noted:  Yes (Negative excepted noted.)





Physical Exam


Physical Exam


Vital Signs





Vital Signs - First Documented








 1/17/19 1/17/19





 13:15 16:00


 


Temp 97.2 


 


Pulse 108 


 


Resp 18 


 


B/P (MAP) 149/82 (104) 


 


Pulse Ox 98 


 


O2 Delivery  Room Air





Capillary Refill : Less Than 3 Seconds


Height, Weight, BMI


Height: 5'8.00"


Weight: 175lbs. oz. 79.627452ee;  BMI


Method:Estimated


General Appearance:  No Apparent Distress, WD/WN


HEENT:  PERRL/EOMI, Moist Mucous Membranes


Neck:  Non Tender, Supple


Respiratory:  Lungs Clear, No Respiratory Distress


Cardiovascular:  Regular Rate, Rhythm, No Murmur


Gastrointestinal:  Normal Bowel Sounds, Non Tender, Soft


Genital/Rectal:  Other (knowles in place)


Extremity:  Normal Capillary Refill, No Calf Tenderness


Neurologic/Psychiatric:  Alert, Oriented x3, Normal Mood/Affect


Skin:  Normal Color, Warm/Dry





Results


Results/Procedures


Labs


Laboratory Tests


1/17/19 14:40








1/18/19 05:40








Patient resulted labs reviewed.


Imaging:  Reviewed Imaging Report





Assessment/Plan


Admission Diagnosis


Displaced right hip fracture


Admission Status:  Inpatient Order (span 2 midnights)


Reason for Inpatient Admission:  


Needs operative repair, will likely need more than two midnights to


stabilize





Diagnosis/Problems


Diagnosis/Problems





(1) Subcapital fracture of right hip


Status:  Acute


Assessment & Plan:  


Displaced subcapital fracture of the right hip


Ortho consulted, appreciate recs


Discussed with Dr Alves given metastatic disease of the pelvis and plan is 

for operative repair tomorrow


NPO after midnight


Fentanyl for pain


Qualifiers:  


   Encounter type:  initial encounter  Fracture type:  closed  Qualified Codes:

  S72.011A - Unspecified intracapsular fracture of right femur, initial 

encounter for closed fracture


(2) Pre-op exam


Assessment & Plan:  


Denies any heart history


Prior to past few weeks had high functional status


Per NSQIP calculator 8.5% risk of serious complication


Discussed patient's risk status with her, defer ultimate decision on risk vs 

benefit to surgery


Will get EKG





(3) Metastatic lung cancer (metastasis from lung to other site)


Status:  Chronic


Assessment & Plan:  


Follows with Dr Johnson


I discussed case with Dr Johnson


Was to have palliative radiation of bony mets


he will see in consult


Qualifiers:  


   Laterality:  unspecified laterality  Qualified Codes:  C34.90 - Malignant 

neoplasm of unspecified part of unspecified bronchus or lung


(4) Metastatic cancer to pelvis


Assessment & Plan:  


Discussed involvement with Dr Alves


Oncology consulted, appreciate recs





(5) Essential (primary) hypertension


Assessment & Plan:  


BP mildly elevated on arrival, likely due to pain


Trend














ARMANDO TERRY MD Jan 17, 2019 15:49

## 2019-01-17 NOTE — ED HIP PAIN/INJURY
General


Chief Complaint:  Hip/Pelvic Problems


Stated Complaint:  R HIP PAIN


Nursing Triage Note:  


PT TO ROOM 4 PER WC. PT CO OF SEVERE R HIP PAIN FROM METASTATIC CA


Source:  patient


Exam Limitations:  no limitations





History of Present Illness


Date Seen by Provider:  2019


Time Seen by Provider:  13:20


Initial Comments


73-year-old female who presents to the emergency room with complaints of severe 

right hip pain. She was seen and evaluated in the emergency room 19 and 

had x-rays but her pain has continued to get worse. Denies initial injury or 

reinjury. History of metastatic cancer, she is currently receiving chemotherapy 

and radiation treatments the [facility].


Timing/Duration:  other (10 days)


Location:  hip (R)


Method of Injury:  unknown


Modifying Factors:  Worse With Movement





Allergies and Home Medications


Allergies


Coded Allergies:  


     lisinopril (Verified  Allergy, Severe, angioedema, 19)





Home Medications


Amlodipine Besylate 5 Mg Tablet, 5 MG PO DAILY, (Reported)


Ezetimibe 10 Mg Tablet, 10 MG PO DAILY, (Reported)


Fentanyl 1 Each Patch.td72, 75 MCG TD Q72H, (Reported)


Imipramine HCl 50 Mg Tablet, 100 MG PO DAILY, (Reported)


   TAKES 2 (50MG) TABLETS 


Imipramine HCl 50 Mg Tablet, 150 MG PO HS, (Reported)


   TAKES 3 (50MG) TABLETS 


Lansoprazole 15 Mg Capsule.dr, 15 MG PO DAILY, (Reported)


Levothyroxine Sodium 50 Mcg Tablet, 50 MCG PO DAILY, (Reported)


Metoclopramide HCl 10 Mg Tablet, 10 MG PO TID, (Reported)


Oxycodone HCl 5 Mg Tablet, 5 MG PO Q6H PRN for PAIN-SEVERE, (Reported)


Potassium Chloride 10 Meq Capsule.er, 10 MEQ PO DAILY, (Reported)


Solifenacin Succinate 5 Mg Tablet, 5 MG PO DAILY, (Reported)





Patient Home Medication List


Home Medication List Reviewed:  Yes





Review of Systems


Constitutional:  no symptoms reported, see HPI


Musculoskeletal:  see HPI, joint pain (right hip pain)





All Other Systems Reviewed


Negative Unless Noted:  Yes





Past Medical-Social-Family Hx


Past Med/Social Hx:  Reviewed Nursing Past Med/Soc Hx


Patient Social History


Alcohol Use:  Denies Use


Recreational Drug Use:  No


Smoking Status:  Former Smoker


2nd Hand Smoke Exposure:  No


Recent Foreign Travel:  No


Contact w/Someone Who Travel:  No


Recent Infectious Disease Expo:  No


Recent Hopitalizations:  Yes





Seasonal Allergies


Seasonal Allergies:  No





Past Medical History


Surgeries:  Yes (spleenectomy)


Abdominal


Respiratory:  No


Cardiac:  Yes


Hypertension


Neurological:  No


Genitourinary:  No


Gastrointestinal:  No


Musculoskeletal:  No


Endocrine:  No


HEENT:  No


Cancer:  Yes


Lung


Psychosocial:  No


Integumentary:  No





Family Medical History


Reviewed Nursing Family Hx


No Pertinent Family Hx





Physical Exam


Vital Signs





Vital Signs - First Documented








 19





 13:15


 


Temp 97.2


 


Pulse 108


 


Resp 18


 


B/P (MAP) 149/82 (104)


 


Pulse Ox 98





Capillary Refill : Less Than 3 Seconds


Height, Weight, BMI


Height: 5'8.00"


Weight: 175lbs. oz. 79.918138xu;  BMI


Method:Estimated


General Appearance:  No Apparent Distress, WD/WN


HEENT:  PERRL/EOMI


Cardiovascular:  Regular Rate, Rhythm, No Edema, No Gallop, No JVD, No Murmur, 

Normal Peripheral Pulses


Respiratory:  Chest Non Tender, Lungs Clear, Normal Breath Sounds, No Accessory 

Muscle Use, No Respiratory Distress, Accessory Muscle Use


Extremity:  Normal Capillary Refill, Normal Inspection, Non Tender, No Calf 

Tenderness, No Pedal Edema, Pelvis Stable (right hip pain and limited range of 

motion.)


Neurologic/Psychiatric:  Alert, Oriented x3, Normal Mood/Affect


Skin:  Normal Color, Warm/Dry





Progress/Results/Core Measures


Results/Orders


Lab Results





Laboratory Tests








Test


 19


14:40 Range/Units


 


 


White Blood Count


 3.8 L


 4.3-11.0


10^3/uL


 


Red Blood Count


 3.51 L


 4.35-5.85


10^6/uL


 


Hemoglobin 10.1 L 11.5-16.0  G/DL


 


Hematocrit 33 L 35-52  %


 


Mean Corpuscular Volume 93  80-99  FL


 


Mean Corpuscular Hemoglobin 29  25-34  PG


 


Mean Corpuscular Hemoglobin


Concent 31 L


 32-36  G/DL





 


Red Cell Distribution Width 20.5 H 10.0-14.5  %


 


Platelet Count


 126 L


 130-400


10^3/uL


 


Mean Platelet Volume 10.9 H 7.4-10.4  FL


 


Neutrophils (%) (Auto) 40 L 42-75  %


 


Lymphocytes (%) (Auto) 39  12-44  %


 


Monocytes (%) (Auto) 19 H 0-12  %


 


Eosinophils (%) (Auto) 1  0-10  %


 


Basophils (%) (Auto) 1  0-10  %


 


Neutrophils # (Auto) 1.5 L 1.8-7.8  X 10^3


 


Lymphocytes # (Auto) 1.5  1.0-4.0  X 10^3


 


Monocytes # (Auto) 0.7  0.0-1.0  X 10^3


 


Eosinophils # (Auto)


 0.0 


 0.0-0.3


10^3/uL


 


Basophils # (Auto)


 0.0 


 0.0-0.1


10^3/uL


 


Neutrophils % (Manual) 45   %


 


Lymphocytes % (Manual) 39   %


 


Monocytes % (Manual) 13   %


 


Eosinophils % (Manual) 0   %


 


Basophils % (Manual) 1   %


 


Band Neutrophils 0   %


 


Reactive Lymphocytes 2   %


 


Hypochromasia SLIGHT   


 


Poikilocytosis SLIGHT   


 


Anisocytosis SLIGHT   


 


Spherocytes SLIGHT   


 


Sodium Level 132 L 135-145  MMOL/L


 


Potassium Level   3.6-5.0  MMOL/L


 


Chloride Level 101    MMOL/L


 


Carbon Dioxide Level 19 L 21-32  MMOL/L


 


Anion Gap 12  5-14  MMOL/L


 


Blood Urea Nitrogen 6 L 7-18  MG/DL


 


Creatinine


 0.93 


 0.60-1.30


MG/DL


 


Estimat Glomerular Filtration


Rate 59 


  





 


BUN/Creatinine Ratio 6   


 


Glucose Level 99    MG/DL


 


Calcium Level 9.2  8.5-10.1  MG/DL


 


Corrected Calcium 9.2  8.5-10.1  MG/DL


 


Total Bilirubin 0.2  0.1-1.0  MG/DL


 


Aspartate Amino Transf


(AST/SGOT) 31 


 5-34  U/L





 


Alanine Aminotransferase


(ALT/SGPT) 12 


 0-55  U/L





 


Alkaline Phosphatase 307 H   U/L


 


Total Protein 7.6  6.4-8.2  GM/DL


 


Albumin 4.0  3.2-4.5  GM/DL








My Orders





Orders - BERNOT,STACY


Morphine  Injection (Morphine  Injection (19 13:30)


Ketorolac Injection (Toradol Injection) (19 13:30)


Orphenadrine Injection (Norflex Injectio (19 13:30)


Hip, Right, 2 Views (19 13:30)


Comprehensive Metabolic Panel (19 14:17)


Ua Culture If Indicated (19 14:17)


Saline Lock/Iv-Start (19 14:17)


Cbc With Automated Diff (19 14:17)


[place] Cath (19 14:17)


Manual Differential (19 14:40)





Medications Given in ED





Vital Signs/I&O











 19





 13:15


 


Temp 97.2


 


Pulse 108


 


Resp 18


 


B/P (MAP) 149/82 (104)


 


Pulse Ox 98














Blood Pressure Mean:  104











Progress


Progress Note :  


   Time:  14:40


Progress Note


Discussed the case with Dr. Alves and he agrees to accept the patient as a 

consult after medical clearance. Spoke to Dr. Rao regarding the patient's 

findings and she did access the cancer center records and the patient does have 

lung cancer with metastases to the bone and liver. Dr. Rao will contact Dr. Alves to notify him of this finding.





Diagnostic Imaging





   Diagonstic Imaging:  Xray


   Plain Films/CT/US/NM/MRI:  hip


Comments


 [facility] [place], [place]





NAME:   WIVERNAVEROISIDRO WASHINGTON


[facility] REC#:   [id number]


ACCOUNT#:   [id number]


PT STATUS:   REG ER


:   1945


PHYSICIAN:   STACY WISEMAN


ADMIT DATE:   19/ER


 ***Draft***


Date of Exam:19





HIP, RIGHT, 2 VIEWS








INDICATION: Right hip pain.





TIME OF EXAM: 02:14 p.m.





Two views right hip demonstrate a right hip fracture. This


appears to be subcapital in location. Femoral acetabular


alignment is normal. The rami are intact.





IMPRESSION: Subcapital right hip fracture.





  Dictated on workstation # [id number]








Dict:   19 1400


Trans:   19 1405


[facility] [id number]





Interpreted by:     TONYA MCCARTNEY MD


Electronically signed by:


   Reviewed:  Reviewed by Me





Departure


Impression





 Primary Impression:  


 Subcapital fracture of right hip


 Additional Impression:  


 lung cancer with metastases to liver and bone


Disposition:   ADMITTED AS INPATIENT


Condition:  Stable/Unchanged





Admissions


Decision to Admit Reason:  Admit from ER (General)


Decision to Admit/Date:  2019


Time/Decision to Admit Time:  15:01





Departure-Patient Inst.


Referrals:  


SHIRA BRAR MD (PCP/Family)


Primary Care Physician











STACY WISEMAN 2019 14:31

## 2019-01-18 VITALS — DIASTOLIC BLOOD PRESSURE: 72 MMHG | SYSTOLIC BLOOD PRESSURE: 150 MMHG

## 2019-01-18 VITALS — DIASTOLIC BLOOD PRESSURE: 70 MMHG | SYSTOLIC BLOOD PRESSURE: 136 MMHG

## 2019-01-18 VITALS — DIASTOLIC BLOOD PRESSURE: 71 MMHG | SYSTOLIC BLOOD PRESSURE: 160 MMHG

## 2019-01-18 VITALS — DIASTOLIC BLOOD PRESSURE: 81 MMHG | SYSTOLIC BLOOD PRESSURE: 157 MMHG

## 2019-01-18 VITALS — DIASTOLIC BLOOD PRESSURE: 81 MMHG | SYSTOLIC BLOOD PRESSURE: 160 MMHG

## 2019-01-18 VITALS — SYSTOLIC BLOOD PRESSURE: 147 MMHG | DIASTOLIC BLOOD PRESSURE: 70 MMHG

## 2019-01-18 VITALS — DIASTOLIC BLOOD PRESSURE: 61 MMHG | SYSTOLIC BLOOD PRESSURE: 133 MMHG

## 2019-01-18 LAB
ALBUMIN SERPL-MCNC: 3.9 GM/DL (ref 3.2–4.5)
ALP SERPL-CCNC: 304 U/L (ref 40–136)
ALT SERPL-CCNC: 9 U/L (ref 0–55)
BASOPHILS # BLD AUTO: 0 10^3/UL (ref 0–0.1)
BASOPHILS NFR BLD AUTO: 1 % (ref 0–10)
BILIRUB SERPL-MCNC: 0.2 MG/DL (ref 0.1–1)
BUN/CREAT SERPL: 8
CALCIUM SERPL-MCNC: 9 MG/DL (ref 8.5–10.1)
CHLORIDE SERPL-SCNC: 104 MMOL/L (ref 98–107)
CO2 SERPL-SCNC: 20 MMOL/L (ref 21–32)
CREAT SERPL-MCNC: 0.84 MG/DL (ref 0.6–1.3)
EOSINOPHIL # BLD AUTO: 0 10^3/UL (ref 0–0.3)
EOSINOPHIL NFR BLD AUTO: 1 % (ref 0–10)
ERYTHROCYTE [DISTWIDTH] IN BLOOD BY AUTOMATED COUNT: 20.2 % (ref 10–14.5)
GFR SERPLBLD BASED ON 1.73 SQ M-ARVRAT: > 60 ML/MIN
GLUCOSE SERPL-MCNC: 109 MG/DL (ref 70–105)
HCT VFR BLD CALC: 33 % (ref 35–52)
HGB BLD-MCNC: 10.3 G/DL (ref 11.5–16)
INR PPP: 0.9 (ref 0.8–1.4)
LYMPHOCYTES # BLD AUTO: 2.3 X 10^3 (ref 1–4)
LYMPHOCYTES NFR BLD AUTO: 54 % (ref 12–44)
MANUAL DIFFERENTIAL PERFORMED BLD QL: NO
MCH RBC QN AUTO: 29 PG (ref 25–34)
MCHC RBC AUTO-ENTMCNC: 32 G/DL (ref 32–36)
MCV RBC AUTO: 92 FL (ref 80–99)
MONOCYTES # BLD AUTO: 0.8 X 10^3 (ref 0–1)
MONOCYTES NFR BLD AUTO: 19 % (ref 0–12)
NEUTROPHILS # BLD AUTO: 1.1 X 10^3 (ref 1.8–7.8)
NEUTROPHILS NFR BLD AUTO: 25 % (ref 42–75)
PLATELET # BLD: 259 10^3/UL (ref 130–400)
PMV BLD AUTO: 10.1 FL (ref 7.4–10.4)
POTASSIUM SERPL-SCNC: 3.8 MMOL/L (ref 3.6–5)
PROT SERPL-MCNC: 7.1 GM/DL (ref 6.4–8.2)
PROTHROMBIN TIME: 12.5 SEC (ref 12.2–14.7)
RBC # BLD AUTO: 3.54 10^6/UL (ref 4.35–5.85)
SODIUM SERPL-SCNC: 139 MMOL/L (ref 135–145)
WBC # BLD AUTO: 4.2 10^3/UL (ref 4.3–11)

## 2019-01-18 PROCEDURE — 0SRR0JZ REPLACEMENT OF RIGHT HIP JOINT, FEMORAL SURFACE WITH SYNTHETIC SUBSTITUTE, OPEN APPROACH: ICD-10-PCS | Performed by: ORTHOPAEDIC SURGERY

## 2019-01-18 PROCEDURE — 0LQJ0ZZ REPAIR RIGHT HIP TENDON, OPEN APPROACH: ICD-10-PCS | Performed by: ORTHOPAEDIC SURGERY

## 2019-01-18 RX ADMIN — FENTANYL CITRATE PRN MCG: 50 INJECTION, SOLUTION INTRAMUSCULAR; INTRAVENOUS at 05:42

## 2019-01-18 RX ADMIN — DICYCLOMINE HYDROCHLORIDE SCH EA: 20 TABLET ORAL at 19:58

## 2019-01-18 RX ADMIN — SODIUM CHLORIDE SCH MLS/HR: 900 INJECTION, SOLUTION INTRAVENOUS at 05:42

## 2019-01-18 RX ADMIN — FENTANYL CITRATE PRN MCG: 50 INJECTION, SOLUTION INTRAMUSCULAR; INTRAVENOUS at 15:49

## 2019-01-18 RX ADMIN — FENTANYL CITRATE PRN MCG: 50 INJECTION, SOLUTION INTRAMUSCULAR; INTRAVENOUS at 23:42

## 2019-01-18 RX ADMIN — ACETAMINOPHEN PRN MG: 500 TABLET ORAL at 23:24

## 2019-01-18 RX ADMIN — SODIUM CHLORIDE, SODIUM LACTATE, POTASSIUM CHLORIDE, AND CALCIUM CHLORIDE PRN MLS/HR: 600; 310; 30; 20 INJECTION, SOLUTION INTRAVENOUS at 16:45

## 2019-01-18 RX ADMIN — FENTANYL CITRATE PRN MCG: 50 INJECTION, SOLUTION INTRAMUSCULAR; INTRAVENOUS at 07:44

## 2019-01-18 RX ADMIN — SODIUM CHLORIDE, SODIUM LACTATE, POTASSIUM CHLORIDE, AND CALCIUM CHLORIDE PRN MLS/HR: 600; 310; 30; 20 INJECTION, SOLUTION INTRAVENOUS at 18:15

## 2019-01-18 RX ADMIN — FENTANYL CITRATE PRN MCG: 50 INJECTION, SOLUTION INTRAMUSCULAR; INTRAVENOUS at 10:40

## 2019-01-18 RX ADMIN — FENTANYL SCH MCG: 75 PATCH TRANSDERMAL at 19:57

## 2019-01-18 RX ADMIN — FENTANYL CITRATE PRN MCG: 50 INJECTION, SOLUTION INTRAMUSCULAR; INTRAVENOUS at 21:35

## 2019-01-18 RX ADMIN — SODIUM CHLORIDE SCH MLS/HR: 900 INJECTION, SOLUTION INTRAVENOUS at 20:09

## 2019-01-18 NOTE — PROGRESS NOTE-HOSPITALIST
Subjective


HPI/CC On Admission


Date Seen by Provider:  2019


Time Seen by Provider:  10:33


Pt is a 73yoCF with a PMH of HTN and metastatic lung cancer to liver and pelvis 

who presented with worsening hip pain. She states she has always had hip pain 

since the 70s but over the past few weeks it has gotten a lot worse. She was 

diagnosed with lung cancer in September of this year and has completed 5 cycles 

of chemo there and is scheduled for her next cycle next week. Because of her 

worsening hip pain MRI was done of pelvis which revealed metastatic disease. 

She was referred to Dr Gleason to palliative rain but before she could make that 

appointment was seen in the ER on  plain films were done and negative. She 

has been using a fentanyl patch at home but has still had severe pain. She has 

been in such pain that she has not been able to bend to sit on the toilet and 

has been urinating in a trash can even. Imaging today reveals a displaced right 

subcapital hip fracture. She is to be admitted for operative repair.


Subjective/Events-last exam


Pt reports doing well. Still having hip pain.





Objective


Exam


Vital Signs





Vital Signs








  Date Time  Temp Pulse Resp B/P (MAP) Pulse Ox O2 Delivery O2 Flow Rate FiO2


 


19 14:06     100 Room Air  


 


19 08:00 98.1 98 20 133/61 (85)    





Capillary Refill : Less Than 3 SecondsLess Than 3 Seconds


General Appearance:  No Apparent Distress, Chronically ill


Respiratory:  Lungs Clear, No Respiratory Distress


Cardiovascular:  Regular Rate, Rhythm, No Murmur


Extremity:  No Calf Tenderness, No Pedal Edema


Neurologic/Psychiatric:  Alert, Oriented x3, No Motor/Sensory Deficits





Results/Procedures


Lab


Laboratory Tests


19 14:40








19 05:40








Patient resulted labs reviewed.


Imaging:  Reviewed Imaging Report





Assessment/Plan


Assessment and Plan


Assess & Plan/Chief Complaint


Right hip fracture





Diagnosis/Problems


Diagnosis/Problems





(1) Subcapital fracture of right hip


Status:  Acute


Assessment & Plan:  


Displaced subcapital fracture of the right hip


Ortho consulted, appreciate recs


Plan for OR today


NPO after midnight


Fentanyl for pain


Qualifiers:  


   Encounter type:  initial encounter  Fracture type:  closed  Qualified Codes:

  S72.011A - Unspecified intracapsular fracture of right femur, initial 

encounter for closed fracture


(2) Pre-op exam


Assessment & Plan:  


Denies any heart history


Prior to past few weeks had high functional status


Per NSQIP calculator 8.5% risk of serious complication


Discussed patient's risk status with her, defer ultimate decision on risk vs 

benefit to surgery


Will get EKG





(3) Metastatic lung cancer (metastasis from lung to other site)


Status:  Chronic


Assessment & Plan:  


Follows with Dr Johnson


I discussed case with Dr Johnson, appreciate recs


Was to have palliative radiation of bony mets


Qualifiers:  


   Laterality:  unspecified laterality  Qualified Codes:  C34.90 - Malignant 

neoplasm of unspecified part of unspecified bronchus or lung


(4) Metastatic cancer to pelvis


Assessment & Plan:  


Discussed involvement with Dr Alves


Oncology consulted, appreciate recs





(5) Essential (primary) hypertension


Assessment & Plan:  


Well controlled, trend





(6) Depression


Assessment & Plan:  


Resume home antidepressant


Qualifiers:  


   Depression Type:  unspecified  Qualified Codes:  F32.9 - Major depressive 

disorder, single episode, unspecified





Clinical Quality Measures


DVT/VTE Risk/Contraindication:


Risk Factor Score Per Nursin


RFS Level Per Nursing on Admit:  4+=Very High











ARMANDO TERRY MD 2019 10:38

## 2019-01-18 NOTE — CONSULTATION REPORT
DATE OF SERVICE:  01/17/2019



ORTHOPEDIC CONSULTATION



IMPRESSION:

1.  Displaced femoral neck fracture of right hip.

2.  Metastatic lung carcinoma, pelvis and right hip.



RECOMMENDATIONS:

Bipolar femoral hemiarthroplasty, right hip.



HISTORY AND PHYSICAL EXAMINATION:

The patient is a 73-year-old female who has a known history of lung carcinoma

with metastasis to her liver.  The patient has been receiving chemotherapy as

well as radiation therapy.  She was having significant pain in her right hip. 

She had actually consulted an orthopedic surgeon in [place], [place].  MRI

examination of her hip was obtained.  She was admitted to the hospital on

01/09/2019 where plain film x-ray of her right hip revealed no bony

abnormalities.  The patient states that she was at home.  She states that she

began having increased pain and noted inability to walk.  Repeat x-rays revealed

a displaced femoral neck fracture at the right hip and an orthopedic

consultation was requested.



The patient on exam demonstrates palpable tenderness at the right hip.  She has

a mild degree of shortening with a very mild degree of external rotation.



X-rays of the right hip revealed displaced femoral neck fracture on the right. 

This is a subcapital fracture with inferior displacement of the femoral head. 

No obvious evidence of metastatic disease noted on these plain film x-rays.  Her

x-rays from 01/09/2019 were reviewed.  No evidence of fracture was noted and no

bony abnormalities within the proximal femur or the acetabulum were noted.  The

films the patient is describing obtained at the radiation oncologist in [place]

are not available for review.



Based on the displacement of her femoral head, I would recommend the patient

proceed forward with the bipolar femoral hemiarthroplasty.  The procedure was

reviewed with the patient.  The fact that she would be weightbearing immediately

after the surgery allowing full weightbearing was reviewed with the patient. 

The determination as to whether or not her prosthesis will be cemented in place

versus press fit will depend on the quality of her bone at the time of the

surgical procedure.  The patient is tentatively scheduled for this bipolar

femoral hemiarthroplasty on tomorrow's date at approximately 2 p.m.





Job ID: [id number]

DocumentID: [id number]

Dictated Date:  01/17/2019 18:40:07

Transcription Date: 01/18/2019 05:59:27

Dictated By: ERENDIRA CELESTIN DO

## 2019-01-18 NOTE — NUR
Pastoral care visit, visited at length with pt, shared her story of critical illnesses and 
losses in her family as well as her current illness.  Pt is coping well but has some fears, 
re her cancer driscoll. I listened, provided support and prayer.

## 2019-01-18 NOTE — NUR
PT. COMPLAINING OF PAIN UNRELIEVED BY THE FENTANYL 50MCG Q2HRS

DR. ETRRY NOTIFIED, NEW ORDERS RECEIVED: GIVE AN ADDITIONAL 25MCG OF FENTANYL NOW, AND CHANGE 
FENTANYL 50MCG TO Q1HRS

WILL CONTINUE TO MONITOR.

## 2019-01-18 NOTE — NUR
PT. TEMP 101.0

DR. TERRY NOTIFIED, NEW ORDERS RECEIVED: TYLENOL 500MG PO Q6HRS PRN PAIN OR TEMP

WILL CONTINUE TO MONITOR.

## 2019-01-18 NOTE — NUR
SPOKE WITH THE PATIENT IN ED YESTERDAY AND SHE STATES HER SON SETS UP HER MEDICATIONS FOR 
HER. I CALLED HIM BUT WAS UNABLE TO GET IN TOUCH WITH HIM UNTIL TODAY. HE READ TO ME HER 
MEDICATIONS FROM THE BOTTLES HE HAD AT HOME AND I COMPARED IT WITH THE EXT MED HX.



HER REGLAN IS WRITTEN QID HOWEVER HE STATES SHE TAKES IT TID. 

SHE WAS GIVEN A SHORT SUPPLY OF FLEXERIL FROM ED EARLIER THIS MONTH BUT HE STATES IT IS GONE 
AND IS NOT SURE IT WAS INTENDED TO BE MORE THAN A SHORT TERM MEDICATION.



HE STATES SHE DOES NOT TAKE ANY OTC MEDICATIONS.

## 2019-01-19 VITALS — SYSTOLIC BLOOD PRESSURE: 132 MMHG | DIASTOLIC BLOOD PRESSURE: 70 MMHG

## 2019-01-19 VITALS — SYSTOLIC BLOOD PRESSURE: 106 MMHG | DIASTOLIC BLOOD PRESSURE: 52 MMHG

## 2019-01-19 VITALS — DIASTOLIC BLOOD PRESSURE: 58 MMHG | SYSTOLIC BLOOD PRESSURE: 113 MMHG

## 2019-01-19 VITALS — DIASTOLIC BLOOD PRESSURE: 57 MMHG | SYSTOLIC BLOOD PRESSURE: 118 MMHG

## 2019-01-19 VITALS — SYSTOLIC BLOOD PRESSURE: 159 MMHG | DIASTOLIC BLOOD PRESSURE: 78 MMHG

## 2019-01-19 LAB
APTT PPP: YELLOW S
BACTERIA #/AREA URNS HPF: NEGATIVE /HPF
BASOPHILS # BLD AUTO: 0 10^3/UL (ref 0–0.1)
BASOPHILS NFR BLD AUTO: 0 % (ref 0–10)
BILIRUB UR QL STRIP: NEGATIVE
BUN/CREAT SERPL: 9
CALCIUM SERPL-MCNC: 8.8 MG/DL (ref 8.5–10.1)
CHLORIDE SERPL-SCNC: 99 MMOL/L (ref 98–107)
CO2 SERPL-SCNC: 25 MMOL/L (ref 21–32)
CREAT SERPL-MCNC: 0.79 MG/DL (ref 0.6–1.3)
EOSINOPHIL # BLD AUTO: 0 10^3/UL (ref 0–0.3)
EOSINOPHIL NFR BLD AUTO: 0 % (ref 0–10)
ERYTHROCYTE [DISTWIDTH] IN BLOOD BY AUTOMATED COUNT: 19.4 % (ref 10–14.5)
FIBRINOGEN PPP-MCNC: CLEAR MG/DL
GFR SERPLBLD BASED ON 1.73 SQ M-ARVRAT: > 60 ML/MIN
GLUCOSE SERPL-MCNC: 116 MG/DL (ref 70–105)
GLUCOSE UR STRIP-MCNC: NEGATIVE MG/DL
HCT VFR BLD CALC: 32 % (ref 35–52)
HGB BLD-MCNC: 9.9 G/DL (ref 11.5–16)
KETONES UR QL STRIP: (no result)
LEUKOCYTE ESTERASE UR QL STRIP: (no result)
LYMPHOCYTES # BLD AUTO: 1.4 X 10^3 (ref 1–4)
LYMPHOCYTES NFR BLD AUTO: 33 % (ref 12–44)
MANUAL DIFFERENTIAL PERFORMED BLD QL: NO
MCH RBC QN AUTO: 29 PG (ref 25–34)
MCHC RBC AUTO-ENTMCNC: 31 G/DL (ref 32–36)
MCV RBC AUTO: 92 FL (ref 80–99)
MONOCYTES # BLD AUTO: 1 X 10^3 (ref 0–1)
MONOCYTES NFR BLD AUTO: 25 % (ref 0–12)
NEUTROPHILS # BLD AUTO: 1.8 X 10^3 (ref 1.8–7.8)
NEUTROPHILS NFR BLD AUTO: 42 % (ref 42–75)
NITRITE UR QL STRIP: NEGATIVE
PH UR STRIP: 6 [PH] (ref 5–9)
PLATELET # BLD: 270 10^3/UL (ref 130–400)
PMV BLD AUTO: 10.1 FL (ref 7.4–10.4)
POTASSIUM SERPL-SCNC: 3.4 MMOL/L (ref 3.6–5)
PROT UR QL STRIP: (no result)
RBC # BLD AUTO: 3.44 10^6/UL (ref 4.35–5.85)
RBC #/AREA URNS HPF: (no result) /HPF
SODIUM SERPL-SCNC: 134 MMOL/L (ref 135–145)
SP GR UR STRIP: 1.01 (ref 1.02–1.02)
SQUAMOUS #/AREA URNS HPF: (no result) /HPF
UROBILINOGEN UR-MCNC: NORMAL MG/DL
WBC # BLD AUTO: 4.2 10^3/UL (ref 4.3–11)
WBC #/AREA URNS HPF: (no result) /HPF

## 2019-01-19 RX ADMIN — IBUPROFEN PRN MG: 200 TABLET, FILM COATED ORAL at 10:45

## 2019-01-19 RX ADMIN — ENOXAPARIN SODIUM SCH MG: 100 INJECTION SUBCUTANEOUS at 08:21

## 2019-01-19 RX ADMIN — CEFAZOLIN SODIUM SCH MLS/HR: 2 SOLUTION INTRAVENOUS at 08:16

## 2019-01-19 RX ADMIN — ACETAMINOPHEN PRN MG: 500 TABLET ORAL at 17:04

## 2019-01-19 RX ADMIN — ASPIRIN SCH MG: 325 TABLET, COATED ORAL at 08:16

## 2019-01-19 RX ADMIN — METOCLOPRAMIDE SCH MG: 10 TABLET ORAL at 16:35

## 2019-01-19 RX ADMIN — CEFAZOLIN SODIUM SCH MLS/HR: 2 SOLUTION INTRAVENOUS at 00:19

## 2019-01-19 RX ADMIN — EZETIMIBE SCH MG: 10 TABLET ORAL at 08:17

## 2019-01-19 RX ADMIN — SODIUM CHLORIDE SCH MLS/HR: 900 INJECTION, SOLUTION INTRAVENOUS at 07:56

## 2019-01-19 RX ADMIN — METOCLOPRAMIDE SCH MG: 10 TABLET ORAL at 06:14

## 2019-01-19 RX ADMIN — LANSOPRAZOLE SCH MG: 15 CAPSULE, DELAYED RELEASE ORAL at 06:15

## 2019-01-19 RX ADMIN — ACETAMINOPHEN PRN MG: 500 TABLET ORAL at 08:17

## 2019-01-19 RX ADMIN — METOCLOPRAMIDE SCH MG: 10 TABLET ORAL at 11:12

## 2019-01-19 RX ADMIN — LEVOTHYROXINE SODIUM SCH MCG: 50 TABLET ORAL at 06:14

## 2019-01-19 RX ADMIN — SOLIFENACIN SUCCINATE SCH MG: 5 TABLET, FILM COATED ORAL at 08:20

## 2019-01-19 RX ADMIN — AMLODIPINE BESYLATE SCH MG: 5 TABLET ORAL at 08:19

## 2019-01-19 RX ADMIN — SODIUM CHLORIDE SCH MLS/HR: 900 INJECTION, SOLUTION INTRAVENOUS at 22:25

## 2019-01-19 RX ADMIN — DICYCLOMINE HYDROCHLORIDE SCH EA: 20 TABLET ORAL at 08:21

## 2019-01-19 RX ADMIN — DICYCLOMINE HYDROCHLORIDE SCH EA: 20 TABLET ORAL at 20:19

## 2019-01-19 NOTE — PROGRESS NOTE-HOSPITALIST
Subjective


HPI/CC On Admission


Date Seen by Provider:  2019


Time Seen by Provider:  10:11


Pt is a 73yoCF with a PMH of HTN and metastatic lung cancer to liver and pelvis 

who presented with worsening hip pain. She states she has always had hip pain 

since the 70s but over the past few weeks it has gotten a lot worse. She was 

diagnosed with lung cancer in September of this year and has completed 5 cycles 

of chemo there and is scheduled for her next cycle next week. Because of her 

worsening hip pain MRI was done of pelvis which revealed metastatic disease. 

She was referred to Dr Gleason to palliative rain but before she could make that 

appointment was seen in the ER on  plain films were done and negative. She 

has been using a fentanyl patch at home but has still had severe pain. She has 

been in such pain that she has not been able to bend to sit on the toilet and 

has been urinating in a trash can even. Imaging today reveals a displaced right 

subcapital hip fracture. She is to be admitted for operative repair.


Subjective/Events-last exam


Pt is awake in bed. States doing ok. RN expresses concern about more confusion 

and fever.





Objective


Exam


Vital Signs





Vital Signs








  Date Time  Temp Pulse Resp B/P (MAP) Pulse Ox O2 Delivery O2 Flow Rate FiO2


 


19 08:17 101.4       


 


19 08:00  116 20 132/70 (90) 94 Room Air  





Capillary Refill : Less Than 3 SecondsLess Than 3 Seconds


General Appearance:  WD/WN, Chronically ill


Respiratory:  Lungs Clear, No Respiratory Distress


Cardiovascular:  Regular Rate, Rhythm, No Murmur


Gastrointestinal:  Normal Bowel Sounds, Soft


Neurologic/Psychiatric:  Alert, Other (oriented but slowed mentation from 

yesterday)





Results/Procedures


Lab


Laboratory Tests


19 06:24








Patient resulted labs reviewed.


Imaging:  Reviewed Imaging Report





Assessment/Plan


Assessment and Plan


Assess & Plan/Chief Complaint


Right hip fracture





Diagnosis/Problems


Diagnosis/Problems





(1) Subcapital fracture of right hip


Status:  Acute


Assessment & Plan:  


Displaced subcapital fracture of the right hip


Ortho consulted, appreciate recs


POD #1


PT/OT


Fentanyl for pain


Qualifiers:  


   Encounter type:  initial encounter  Fracture type:  closed  Qualified Codes:

  S72.011A - Unspecified intracapsular fracture of right femur, initial 

encounter for closed fracture


(2) Fever


Assessment & Plan:  


CXR and UA ordered


Monitor labs closely given on chemo- was mildly neutropenic on arrival but 

resolved today


Qualifiers:  


   Fever type:  unspecified  Qualified Codes:  R50.9 - Fever, unspecified


(3) Metastatic lung cancer (metastasis from lung to other site)


Status:  Chronic


Assessment & Plan:  


Follows with Dr Johnson, consulted, appreciate recs


Was to have palliative radiation of bony mets


Qualifiers:  


   Laterality:  unspecified laterality  Qualified Codes:  C34.90 - Malignant 

neoplasm of unspecified part of unspecified bronchus or lung


(4) Metastatic cancer to pelvis


Assessment & Plan:  


Pathologic fracture


Oncology consulted, appreciate recs





(5) Essential (primary) hypertension


Assessment & Plan:  


Well controlled, trend





(6) Depression


Assessment & Plan:  


Resume home antidepressant


Qualifiers:  


   Depression Type:  unspecified  Qualified Codes:  F32.9 - Major depressive 

disorder, single episode, unspecified





Clinical Quality Measures


DVT/VTE Risk/Contraindication:


Risk Factor Score Per Nursin


RFS Level Per Nursing on Admit:  4+=Very High











ARMANDO TERRY MD 2019 10:14

## 2019-01-19 NOTE — PROGRESS NOTE (SOAP)
Subjective


Date Seen by a Provider:  2019


Time Seen by a Provider:  08:34


Subjective/Events-last exam


Feels ok, little confused





Objective


Exam





Vital Signs








  Date Time  Temp Pulse Resp B/P (MAP) Pulse Ox O2 Delivery O2 Flow Rate FiO2


 


19 08:17 101.4       


 


19 07:15     98 Room Air  


 


19 04:35 98.9 98 20 159/78 (105) 99 Room Air  


 


19 23:24 101.0       


 


19 23:20 101.0 113 22 160/81 (107) 100 Room Air  


 


19 20:50      Room Air  


 


19 20:29     96 Room Air  


 


19 20:00 96.9 86 16 160/71 (100) 100 Room Air  


 


19 16:00 99.0 88 20 147/70 (95) 99 Room Air  


 


19 14:06     100 Room Air  


 


19 12:00 98.9 95 20 157/81 (106) 100 Room Air  


 


19 11:08     90 Room Air  














I & O 


 


 19





 07:00


 


Intake Total 3720 ml


 


Output Total 5575 ml


 


Balance -1855 ml





Capillary Refill : Less Than 3 SecondsLess Than 3 Seconds


General Appearance:  No Apparent Distress


Respiratory:  No Accessory Muscle Use, No Respiratory Distress


Cardiovascular:  Regular Rate, Rhythm, Normal Peripheral Pulses


Gastrointestinal:  soft


Neurologic/Psychiatric:  Alert, No Motor/Sensory Deficits


Skin:  Normal Color, Other (Dressing dry right hip)





Results


Lab


Laboratory Tests


19 06:24: 


White Blood Count 4.2L, Red Blood Count 3.44L, Hemoglobin 9.9L, Hematocrit 32L, 

Mean Corpuscular Volume 92, Mean Corpuscular Hemoglobin 29, Mean Corpuscular 

Hemoglobin Concent 31L, Red Cell Distribution Width 19.4H, Platelet Count 270, 

Mean Platelet Volume 10.1, Neutrophils (%) (Auto) 42, Lymphocytes (%) (Auto) 33

, Monocytes (%) (Auto) 25H, Eosinophils (%) (Auto) 0, Basophils (%) (Auto) 0, 

Neutrophils # (Auto) 1.8, Lymphocytes # (Auto) 1.4, Monocytes # (Auto) 1.0, 

Eosinophils # (Auto) 0.0, Basophils # (Auto) 0.0, Sodium Level 134L, Potassium 

Level 3.4L, Chloride Level 99, Carbon Dioxide Level 25, Anion Gap 10, Blood 

Urea Nitrogen 7, Creatinine 0.79, Estimat Glomerular Filtration Rate > 60, BUN/

Creatinine Ratio 9, Glucose Level 116H, Calcium Level 8.8





Microbiology


19 MRSA Screen - Final, Complete


          MRSA not isolated





Assessment/Plan


Assessment/Plan


Assess & Plan/Chief Complaint


S/P Right hip Shane-Arthroplasty for pathologic fracture


Fever





Plan: Check CXR


continue post op care





Clinical Quality Measures


DVT/VTE Risk/Contraindication:


Risk Factor Score Per Nursin


RFS Level Per Nursing on Admit:  4+=Very High











CHIP RAMIREZ MD 2019 08:35

## 2019-01-19 NOTE — DIAGNOSTIC IMAGING REPORT
Indication: Shortness of air



Comparison: None available



Technique: Single radiograph of the chest dated 01/19/2019 



Findings: Right-sided Port-A-Cath is present with the distal tip

overlying the cavoatrial junction. The cardiac silhouette is

within normal limits in size. No significant pulmonary vascular

congestion. Low lung volumes. Mild left basilar opacities are

present with associated mild elevation of the left hemidiaphragm.

The right lung appears clear.  Focal pulmonary opacity. No

significant right pleural effusion. Trace left pleural effusion

suspected. No pneumothorax. No acute osseous abnormality.



Impression: Low lung volumes with mild left basilar atelectasis

and/or pneumonitis with mild elevation of the left hemidiaphragm.



Right-sided Port-A-Cath is in place. No pneumothorax.



Dictated by: 



  Dictated on workstation # [id number]

## 2019-01-19 NOTE — ANESTHESIA-GENERAL POST-OP
General


Patient Condition


Mental Status/LOC:  Same as Preop


Cardiovascular:  Satisfactory


Nausea/Vomiting:  Absent


Respiratory:  Satisfactory


Pain:  Controlled


Complications:  Absent





Post Op Complications


Complications


None





Follow Up Care/Instructions


Patient Instructions


None needed.





Anesthesia/Patient Condition


Patient Condition


Patient is doing well, no complaints, stable vital signs, no apparent adverse 

anesthesia problems.   


No complications reported per nursing.


D/C home per [facility] Criteria:  VALENTIN Roque CRNA Jan 19, 2019 18:03

## 2019-01-19 NOTE — OPERATIVE REPORT
DATE OF SERVICE:  01/18/2019



PREOPERATIVE DIAGNOSIS:

Pathologic displaced femoral neck fracture, right hip.



POSTOPERATIVE DIAGNOSES:

1.  Pathologic displaced femoral neck fracture, right hip.

2.  Chronic gluteus medius tendon avulsion, right hip.



PROCEDURE:

1.  Bipolar femoral hemiarthroplasty, right hip.

2.  Gluteus medius tendon repair, right hip.



SURGEON:

Linus Celestin DO



FIRST ASSISTANT:

EFREM Jacob



SURGICAL FIRST ASSIST DUTIES:

Daniel Rodriguez, surgical first assistant, was utilized throughout the entire

procedure for patient positioning, soft tissue retraction, placement of _____

bipolar implants, wound closure, dressing application and the patient transfer.



ANESTHESIA:

Spinal with anticipated fascia iliaca block.



ESTIMATED BLOOD LOSS:

200 mL.



COMPLICATIONS:

None.



INDICATIONS AND FINDINGS:

The patient is a 73-year-old female with a known history of lung carcinoma with

metastasis to her liver as well as to the pelvis.  The patient was complaining

of pain in the right hip.  Plain film x-rays demonstrated no evidence of

fracture.  She was receiving radiation therapy as well as chemotherapy.  The

patient had an MRI scan, which apparently demonstrated involvement of the

pelvis.  The patient had twist in her hip.  She felt immediate pain and x-rays

revealed a displaced femoral neck fracture.  The patient was taken to surgery

where a bipolar femoral hemiarthroplasty was performed utilizing the Biomet

system with a press fit 11 mm porous coated reduced distal offset stem with a 50

mm acetabular cup, a 28 mm -6 modular head component.  On entering the hip, the

patient had a chronic avulsion of her gluteus medius tendon.  This was scarred

to the iliotibial band.  This was repaired with multiple drill holes through the

greater trochanter.



PROCEDURE IN DETAIL:

The patient was seen by anesthesia preoperatively and a spinal block was

performed.  The patient was then transported to the operating table and

positioned in the left lateral position on the pegboard.  She was secured to the

pegboard in the left lateral decubitus position and a ChloraPrep and sterile

drape of the right hip of the right lower extremity was performed.  A lateral

longitudinal incision was centered over the greater trochanter of the right hip.

 Incision was deepened with electrocautery through the iliotibial band.  The

chronic avulsion of the gluteus medius tendon was identified.  A Hardinge

approach with electrocautery was used to divide the vastus lateralis fascia and

reflect the scarred tissues _____ head repositioned itself under the femoral

neck up into the tendon proximal to the greater trochanter.  Additional

subperiosteal dissection was performed exposing the fracture of the femoral

neck.  The hip was externally rotated, flexed and dislocated.  An osteotomy

through the femoral neck was performed.  There was no evidence of obvious

cancerous involvement on gross inspection of the bone and no overt areas of

softening of the bone were noted as well.  The head was removed; it was sized to

a 50 mm head.  The labrum about the acetabulum was excised.  A box chisel was

used to open the proximal femur and then a canal finder was placed.  The

proximal femur was broached up to an 11 mm stem.  The calcar was reamed.  The

hip was reduced with a -3 stem.  The patient continued to have excessive tension

on the soft tissues.  Therefore, a -6 neck was placed.  The hip was reduced,

taken through range of motion and found to be stable.  Broach was removed.  The

hip was irrigated extensively with normal saline solution.  The 11 mm stem was

press fit into position.  Bone harvested from the head and neck was then grafted

in and around the stem proximally.  The bipolar components were assembled, cold

welded on the stem.  The hip was reduced.  Multiple drill holes were then placed

through the greater tuberosity.  A #2 Ti-Cron suture was placed through the

drill holes placed through the anterior aspect of the gluteus tendon.  The

tendon was pulled back into position and the sutures were noted.  The vastus

lateralis fascia was closed with interrupted sutures of the same #2 Ti-Cron

along with a running suture.  The iliotibial band was closed with a running

suture of #1 Vicryl.  The subcutaneous tissues were closed with 0 and 2-0 Vicryl

suture.  The skin was closed with stainless steel staples and Adaptic Neosporin

bulky dressing was placed about the right hip.  The patient was transported to

postop recovery with anesthesia personnel present.  Anesthesia anticipates

placing a fascia iliaca block for postop pain control at that time.





Job ID: [id number]

DocumentID: [id number]

Dictated Date:  01/18/2019 18:44:27

Transcription Date: 01/19/2019 03:25:19

Dictated By: LINUS CELESTIN DO

## 2019-01-20 VITALS — SYSTOLIC BLOOD PRESSURE: 146 MMHG | DIASTOLIC BLOOD PRESSURE: 65 MMHG

## 2019-01-20 VITALS — SYSTOLIC BLOOD PRESSURE: 113 MMHG | DIASTOLIC BLOOD PRESSURE: 58 MMHG

## 2019-01-20 VITALS — DIASTOLIC BLOOD PRESSURE: 53 MMHG | SYSTOLIC BLOOD PRESSURE: 100 MMHG

## 2019-01-20 VITALS — DIASTOLIC BLOOD PRESSURE: 56 MMHG | SYSTOLIC BLOOD PRESSURE: 122 MMHG

## 2019-01-20 VITALS — SYSTOLIC BLOOD PRESSURE: 152 MMHG | DIASTOLIC BLOOD PRESSURE: 67 MMHG

## 2019-01-20 RX ADMIN — EZETIMIBE SCH MG: 10 TABLET ORAL at 08:18

## 2019-01-20 RX ADMIN — DICYCLOMINE HYDROCHLORIDE SCH EA: 20 TABLET ORAL at 08:17

## 2019-01-20 RX ADMIN — IBUPROFEN PRN MG: 200 TABLET, FILM COATED ORAL at 08:15

## 2019-01-20 RX ADMIN — LEVOTHYROXINE SODIUM SCH MCG: 50 TABLET ORAL at 06:49

## 2019-01-20 RX ADMIN — METOCLOPRAMIDE SCH MG: 10 TABLET ORAL at 11:19

## 2019-01-20 RX ADMIN — DICYCLOMINE HYDROCHLORIDE SCH EA: 20 TABLET ORAL at 19:31

## 2019-01-20 RX ADMIN — AMLODIPINE BESYLATE SCH MG: 5 TABLET ORAL at 08:16

## 2019-01-20 RX ADMIN — ACETAMINOPHEN PRN MG: 500 TABLET ORAL at 11:19

## 2019-01-20 RX ADMIN — METOCLOPRAMIDE SCH MG: 10 TABLET ORAL at 16:53

## 2019-01-20 RX ADMIN — SOLIFENACIN SUCCINATE SCH MG: 5 TABLET, FILM COATED ORAL at 08:16

## 2019-01-20 RX ADMIN — LANSOPRAZOLE SCH MG: 15 CAPSULE, DELAYED RELEASE ORAL at 06:50

## 2019-01-20 RX ADMIN — METOCLOPRAMIDE SCH MG: 10 TABLET ORAL at 06:48

## 2019-01-20 RX ADMIN — ENOXAPARIN SODIUM SCH MG: 100 INJECTION SUBCUTANEOUS at 08:16

## 2019-01-20 RX ADMIN — ACETAMINOPHEN PRN MG: 500 TABLET ORAL at 19:34

## 2019-01-20 RX ADMIN — ASPIRIN SCH MG: 325 TABLET, COATED ORAL at 08:15

## 2019-01-20 NOTE — PROGRESS NOTE (SOAP)
Subjective


Date Seen by a Provider:  2019


Time Seen by a Provider:  09:01


Subjective/Events-last exam


POD #2 s/p right hip hemiarthroplasty


Complains of right hip pain


Review of Systems


Musculoskeletal:  leg pain





Objective


Exam





Vital Signs








  Date Time  Temp Pulse Resp B/P (MAP) Pulse Ox O2 Delivery O2 Flow Rate FiO2


 


19 08:15 100.0       


 


19 07:17     94 Room Air  


 


19 03:59 97.9 109 19 152/67 (95) 91 Room Air  


 


19 01:49     90 Room Air  


 


19 00:09 97.8 91 20 122/56 (78) 93 Room Air  


 


19 22:25     93 Room Air  


 


19 21:58     94 Room Air 0.00 


 


19 19:53 97.8 93 18 106/52 (70) 93 Room Air  


 


19 18:40     92 Room Air  


 


19 16:11 97.5 98 22 118/57 (77) 91 Room Air  


 


19 15:50     93 Room Air  


 


19 12:00 97.1 95 20 113/58 (76) 93 Room Air  


 


19 11:25 99.4       


 


19 11:21     99 Nasal Cannula 1.00 


 


19 10:45 101.0       














I & O 


 


 19





 07:00


 


Intake Total 3070 ml


 


Output Total 1150 ml


 


Balance 1920 ml





Capillary Refill : Less Than 3 SecondsLess Than 3 Seconds


General Appearance:  No Apparent Distress


Neck:  Non Tender


Respiratory:  No Respiratory Distress


Cardiovascular:  Regular Rate, Rhythm


Neurologic/Psychiatric:  Alert, Oriented x3, No Motor/Sensory Deficits, Normal 

Mood/Affect, CNs II-XII Norm as Tested, Other (RLE motor intact)


Skin:  Other (RIght hip dressing CDI)





Results


Lab


Laboratory Tests


19 10:20: 


Urine Color YELLOW, Urine Clarity CLEAR, Urine pH 6, Urine Specific Gravity 

1.010L, Urine Protein 1+H, Urine Glucose (UA) NEGATIVE, Urine Ketones 1+H, 

Urine Nitrite NEGATIVE, Urine Bilirubin NEGATIVE, Urine Urobilinogen NORMAL, 

Urine Leukocyte Esterase 1+H, Urine RBC (Auto) 1+H, Urine RBC NONE, Urine WBC 0-

2, Urine Squamous Epithelial Cells 0-2, Urine Crystals NONE, Urine Bacteria 

NEGATIVE, Urine Casts NONE, Urine Mucus NEGATIVE, Urine Culture Indicated NO





Microbiology


19 MRSA Screen - Final, Complete


          MRSA not isolated





Assessment/Plan


Assessment/Plan


Assess & Plan/Chief Complaint


Right femur pathologic fracture


s/p right hip hemiarthroplasty


Patient is stable from an Orthopaedic stance.


Ortho to sign off at this time. Please contact Dr. Alves for Questions or 

concerns


Patient to follow up with Dr. Alves in 2 weeks





Clinical Quality Measures


DVT/VTE Risk/Contraindication:


Risk Factor Score Per Nursin


RFS Level Per Nursing on Admit:  4+=Very High











WILMER KNIGHT 2019 09:04

## 2019-01-20 NOTE — PHYSICAL THERAPY EVALUATION
PT Evaluation-General


Medical Diagnosis


Admission Date


2019 at 14:47


Medical Diagnosis:  right hip fracture


Onset Date:  2019





Therapy Diagnosis


Therapy Diagnosis:  generalized weakness/debility





Height/Weight


Height (Feet):  5


Height (Inches):  5.00


Weight (Pounds):  159


Weight (Ounces):  8.0





Precautions


Precautions/Isolations:  Fall Prevention, Standard Precautions





Weight Bear Status


Right Lower Extremity:  Right


Full Weight Bearing


Left Lower Extremity:  Left


Full Weight Bearing





Referral


Physician:  Long


Reason for Referral:  Evaluation/Treatment





Medical History


Additional Medical History


metastatic lung cancer to bone and liver/pelvic lesion


Current History


right hip pain x several months


Reviewed History:  Yes





Social History


Home:  Single Level


Current Living Status:  Alone


Entry Into Home:  Stairs With Railing


PT Steps Into Home:  4





Prior/Core FIM


Prior Level of Function


Therapy Code Descriptions/Definitions 





Functional [place] Measure:


0=Not Assessed/NA        4=Minimal Assistance


1=Total Assistance        5=Supervision or Setup


2=Maximal Assistance  6=Modified [place]


3=Moderate Assistance 7=Complete [place]








Therapy Quality Codes:


6    Independent with activity with or without an assistive device


5    Patient requires set up or clean up by helper.  Patient completes activity

  by  themselves


4    Supervision or touching assist (CGA). [place] provide cues , steadying 

assist


3    The helper provides less than half the effort to complete the activity


2    The helper provides more than half the effort to complete the activity


1    Dependent.  The helper does all the effort to complete an activity 


7    Patient refused to complete or attempt activity


9    The patient did not perform the activity before the current illness or 

injury


88  Not attempted due to Medical conditions or safety concerns





Functional Abilities and Goals:


Independent: Patient completed the activities by him/herself, with or without 

an assistive device, with no assistance from a helper.


Needed Some Help: Patient needed partial assistance from another person to 

complete activities.


Dependent: A helper completed the activities for the patient. 


Unknown:


Not Applicable:


Bed Mobility:  7


Transfers (B,C,W/C) (FIM):  7


Gait:  7


Stairs:  7


Indoor Mobility (Ambulation):  Independent


Stairs:  Independent


Prior Devices Use:  None





PT Evaluation-Current


Subjective


Patient is very confused and has difficulty with following simple command.  

Requires redirection to remain on task.





Pain





   Numeric Pain Scale:  10-Worst Possible Pain


   Location:  Right


   Location Body Site:  Hip


   Pain Description:  Acute


   Comment:  FLACC





Objective


Patient Orientation:  Confused


Problem Solving:  Poor


Comprehension:  2


Expression:  2


Social Interaction:  2


Problem Solvin


Memory:  2


Attachments:  Gallegos Catheter





ROM/Strength


ROM Lower Extremities


right THR/left LE WFL


Strength Lower Extremities


right 3-/5 grossly/left LE 3/5 grossly (unable to formally test due to confusion

)





Integumentary/Posture


Integumentary


refer to nursing notes


Bladder Incontinence:  Gallegos Cath


Posture


WFL





Neuromuscular


(Tone, Coordination, Reflexes)


grossly intact





Sensory


Vision:  Wears Glasses


Hearing:  Impaired


Sensation Right Lower Extremit:  Impaired


Sensation Left Lower Extremity:  Impaired





Transfers


Therapy Code Descriptions/Definitions 





Functional [place] Measure:


0=Not Assessed/NA        4=Minimal Assistance


1=Total Assistance        5=Supervision or Setup


2=Maximal Assistance  6=Modified [place]


3=Moderate Assistance 7=Complete [place]


Transfers (B, C, W/C) (FIM):  2


Scootin


Rollin


Supine to/from Sit:  2


Sit to/from Stand:  2





Gait


Mode of Locomotion:  Walk


Anticipated Mode of Locomotion:  Walk


Gait (FIM):  1


Distance (FIM):  1=up to 49 ft


Distance:  3 steps


Gait Level of Assist:  2


Gait Persons Needed:  1


Gait Assistive Device:  FWW


Comments/Gait Description


assist for FWW placement and advancement of bilateral LE due to confusion and 

resistance to all mobility





Balance


Sitting Static:  Fair


Sitting Dynamic:  Fair


Standing Static:  Poor


 Standing Dynamic:  Poor





Assessment/Needs


73 y.o. female, is very confused and unable to follow simple direction for 

safety.  Patient is very impulsive and unaware of safety concerns even with 

education and redirection.  She will benefit from skilled PT to address 

functional strength and mobility, however, from a PT standpoint, patient will 

require extended care facility due to inability to tolerate intensive therapies 

and confusion with severely diminished safety awareness.  Patient lived alone 

PLOF.


Rehab Potential:  Guarded


Post Rehab Potential-Barriers:  metastatic cancer





PT Long Term Goals


Long Term Goals


PT Long Term Goals Time Frame:  2019


Transfers (B,C,W/C) (FIM):  4


Gait (FIM):  1


Gait distance (FIM):  1=up to 49 ft


Distance:  20'


Gait Level of Assist:  4


Gait Assistive Device:  FWW





PT Plan


Problem List


Problem List:  Activity Tolerance, Functional Strength, Safety, Balance, Gait, 

Transfer, Bed Mobility





Treatment/Plan


Treatment Plan:  Continue Plan of Care


Treatment Plan:  Bed Mobility, Education, Functional Activity Deysi, Functional 

Strength, Gait, Safety, Therapeutic Exercise, Transfers


Treatment Duration:  2019


Frequency:  11 times per week


Estimated Hrs Per Day:  .5 hour per day


Patient and/or Family Agrees t:  Yes





Safety Risks/Education


Patient Education:  Safety Issues


Teaching Recipient:  Patient


Teaching Methods:  Discussion


Response to Teaching:  Unable to Return Demonstration, Unable to Comprehend, 

Reinforcement Needed





Discharge Recommendations


Therapy D/C Recommendations:  Nursing Home Placement, Skilled Nursing (TCU/NH)





Time/GCodes


Time In:  845


Time Out:  903


Total Billed Treatment Time:  18


Total Billed Treatment


1 visit


EVMod 18 min











DELVIS ROLLINS PT 2019 09:14

## 2019-01-20 NOTE — PROGRESS NOTE-HOSPITALIST
Subjective


HPI/CC On Admission


Date Seen by Provider:  2019


Time Seen by Provider:  10:37


Pt is a 73yoCF with a PMH of HTN and metastatic lung cancer to liver and pelvis 

who presented with worsening hip pain. She states she has always had hip pain 

since the 70s but over the past few weeks it has gotten a lot worse. She was 

diagnosed with lung cancer in September of this year and has completed 5 cycles 

of chemo there and is scheduled for her next cycle next week. Because of her 

worsening hip pain MRI was done of pelvis which revealed metastatic disease. 

She was referred to Dr Gleason to palliative rain but before she could make that 

appointment was seen in the ER on  plain films were done and negative. She 

has been using a fentanyl patch at home but has still had severe pain. She has 

been in such pain that she has not been able to bend to sit on the toilet and 

has been urinating in a trash can even. Imaging today reveals a displaced right 

subcapital hip fracture. She is to be admitted for operative repair.


Subjective/Events-last exam


Pt reports doing well. Has pain but pain medications currently helping.





Objective


Exam


Vital Signs





Vital Signs








  Date Time  Temp Pulse Resp B/P (MAP) Pulse Ox O2 Delivery O2 Flow Rate FiO2


 


19 09:00     94 Room Air 0.00 


 


19 09:00 100.0       


 


19 08:00  111 18 146/65 (92)    





Capillary Refill : Less Than 3 SecondsLess Than 3 Seconds


General Appearance:  No Apparent Distress, Chronically ill


Respiratory:  Lungs Clear, No Respiratory Distress


Cardiovascular:  Regular Rate, Rhythm, No Murmur


Gastrointestinal:  Normal Bowel Sounds, Soft


Neurologic/Psychiatric:  Alert, Other (oriented to self, place, and major 

details)





Results/Procedures


Lab


Patient resulted labs reviewed.


Imaging:  Reviewed Imaging Report





Assessment/Plan


Assessment and Plan


Assess & Plan/Chief Complaint


Right hip fracture





Diagnosis/Problems


Diagnosis/Problems





(1) Subcapital fracture of right hip


Status:  Acute


Assessment & Plan:  


Displaced subcapital fracture of the right hip


Ortho consulted, appreciate recs


POD #2


PT/OT


Fentanyl and oxycodone for pain prn 


Continue home fentanyl patch for chronic pain


Qualifiers:  


   Encounter type:  initial encounter  Fracture type:  closed  Qualified Codes:

  S72.011A - Unspecified intracapsular fracture of right femur, initial 

encounter for closed fracture


(2) Fever


Assessment & Plan:  


CXR and UA negative


Now resolved, trend


Qualifiers:  


   Fever type:  post-procedural  Qualified Codes:  R50.82 - Postprocedural fever


(3) Metastatic lung cancer (metastasis from lung to other site)


Status:  Chronic


Assessment & Plan:  


Follows with Dr Johnson, consulted, appreciate recs


Was to have palliative radiation of bony mets


Qualifiers:  


   Laterality:  unspecified laterality  Qualified Codes:  C34.90 - Malignant 

neoplasm of unspecified part of unspecified bronchus or lung


(4) Metastatic cancer to pelvis


Assessment & Plan:  


Pathologic fracture


Oncology consulted, appreciate recs





(5) Essential (primary) hypertension


Assessment & Plan:  


Well controlled, trend





(6) Depression


Assessment & Plan:  


Resume home antidepressant


Qualifiers:  


   Depression Type:  unspecified  Qualified Codes:  F32.9 - Major depressive 

disorder, single episode, unspecified





Clinical Quality Measures


DVT/VTE Risk/Contraindication:


Risk Factor Score Per Nursin


RFS Level Per Nursing on Admit:  4+=Very High











ARMANDO TERRY MD 2019 10:40

## 2019-01-21 VITALS — DIASTOLIC BLOOD PRESSURE: 63 MMHG | SYSTOLIC BLOOD PRESSURE: 132 MMHG

## 2019-01-21 VITALS — DIASTOLIC BLOOD PRESSURE: 60 MMHG | SYSTOLIC BLOOD PRESSURE: 134 MMHG

## 2019-01-21 RX ADMIN — LANSOPRAZOLE SCH MG: 15 CAPSULE, DELAYED RELEASE ORAL at 05:40

## 2019-01-21 RX ADMIN — ASPIRIN SCH MG: 325 TABLET, COATED ORAL at 09:24

## 2019-01-21 RX ADMIN — ENOXAPARIN SODIUM SCH MG: 100 INJECTION SUBCUTANEOUS at 09:24

## 2019-01-21 RX ADMIN — IBUPROFEN PRN MG: 200 TABLET, FILM COATED ORAL at 09:23

## 2019-01-21 RX ADMIN — DICYCLOMINE HYDROCHLORIDE SCH EA: 20 TABLET ORAL at 20:11

## 2019-01-21 RX ADMIN — LEVOTHYROXINE SODIUM SCH MCG: 50 TABLET ORAL at 05:41

## 2019-01-21 RX ADMIN — FENTANYL SCH MCG: 75 PATCH TRANSDERMAL at 18:13

## 2019-01-21 RX ADMIN — DICYCLOMINE HYDROCHLORIDE SCH EA: 20 TABLET ORAL at 09:22

## 2019-01-21 RX ADMIN — METOCLOPRAMIDE SCH MG: 10 TABLET ORAL at 05:40

## 2019-01-21 RX ADMIN — AMLODIPINE BESYLATE SCH MG: 5 TABLET ORAL at 09:23

## 2019-01-21 RX ADMIN — FENTANYL CITRATE PRN MCG: 50 INJECTION, SOLUTION INTRAMUSCULAR; INTRAVENOUS at 16:53

## 2019-01-21 RX ADMIN — METOCLOPRAMIDE SCH MG: 10 TABLET ORAL at 11:44

## 2019-01-21 RX ADMIN — EZETIMIBE SCH MG: 10 TABLET ORAL at 09:21

## 2019-01-21 RX ADMIN — METOCLOPRAMIDE SCH MG: 10 TABLET ORAL at 16:45

## 2019-01-21 RX ADMIN — ACETAMINOPHEN PRN MG: 500 TABLET ORAL at 05:39

## 2019-01-21 RX ADMIN — SOLIFENACIN SUCCINATE SCH MG: 5 TABLET, FILM COATED ORAL at 09:22

## 2019-01-21 NOTE — NUR
Met with pt about continued care plans. Pt still wanting to pursue therapy on Acute Rehab 
Unit. Contacting pt's son Linus Wong to discuss options. Acute Rehab in [place] has no bed 
availability today but may have a bed tomorrow. The Skilled Unit at [facility] has no available bed. Pt would like to pursue Acute Rehab here and evaluation is 
pending.

## 2019-01-21 NOTE — NUR
Rehab evaluations pending at our Acute Rehab and evaluations sent to [facility]. Pt 
opposed to nursing home placement. Met with son who is in favor of an Acute Rehab placement 
and was advised that [facility] in [place] didn't have available bed. 
Awaiting rehab evaluations determinations.

## 2019-01-21 NOTE — PHYSICAL THERAPY DAILY NOTE
PT Daily Note-Current


Subjective


Pt was in bed and did not want to agree to PT at the beginning due to a movie 

being on. Pt agreed to PT if she could watch movie in chair.





Pain





   Numeric Pain Scale:  3


   Location:  No Pain Reported, Right


   Location Body Site:  Hip





Mental Status


Patient Orientation:  Person, Confused


Attachments:  SCD's, Gallegos Catheter


Pt also had abduction pillow in bed.





Transfers


Therapy Code Descriptions/Definitions 





Functional [place] Measure:


0=Not Assessed/NA        4=Minimal Assistance


1=Total Assistance        5=Supervision or Setup


2=Maximal Assistance  6=Modified [place]


3=Moderate Assistance 7=Complete [place]








Therapy Quality Codes:


6    Independent with activity with or without an assistive device


5    Patient requires set up or clean up by helper.  Patient completes activity

  by  themselves


4    Supervision or touching assist (CGA). [place] provide cues , steadying 

assist


3    The helper provides less than half the effort to complete the activity


2    The helper provides more than half the effort to complete the activity


1    Dependent.  The helper does all the effort to complete an activity 


7    Patient refused to complete or attempt activity


9    The patient did not perform the activity before the current illness or 

injury


88  Not attempted due to Medical conditions or safety concerns


Transfers (B, C, W/C) (FIM):  3


Scooting:  3


Supine to/from Sit:  3


Sit to/from Stand:  3


Pt at times could perform parts of the transfer by herself, but then would 

require PT assistance with RLE or by her grabbing onto PT.





Weight Bearing


Right Lower Extremity:  Right


Full Weight Bearing


Left Lower Extremity:  Left


Full Weight Bearing





Gait Training


Gait (FIM):  1


Distance (FIM):  1=up to 49 ft


Distance:  25'


Gait Level of Assist:  4


Gait Persons Needed:  1


Gait Assistive Device:  FWW


Pt amb with a step to pattern with FWW and keeps RLE ex rotated during gait.





Exercises


Seated Therapy Exercises:  Ankle pumps, Long arc quads, Hip flexion (LLE only)


Seated Reps:  10





Assessment


Current Status:  Fair Progress


Pt bed mobility at times was indep by pt, but pt required assitance of PT with 

RLE and would reach out and grab PT during sit<>stand and supine to sit. Pt was 

able to amb from bed to door and to her chair (25') with FWW and CGA. Pt 

performed seated LE ex. SPT moved table to pt and pt reports that PT bumped her 

"bad toe." Pt now in recliner with all needs met.





PT Long Term Goals


Long Term Goals


PT Long Term Goals Time Frame:  Feb 9, 2019


Transfers (B,C,W/C) (FIM):  4


Gait (FIM):  1


Gait distance (FIM):  1=up to 49 ft


Distance:  20'


Gait Level of Assist:  4


Gait Assistive Device:  FWW





PT Plan


Problem List


Problem List:  Activity Tolerance, Functional Strength, Safety, Balance, Gait, 

Transfer, Bed Mobility, ROM





Treatment/Plan


Treatment Plan:  Continue Plan of Care


Treatment Plan:  Bed Mobility, Education, Functional Activity Deysi, Functional 

Strength, Gait, Safety, Therapeutic Exercise, Transfers


Treatment Duration:  Feb 9, 2019


Frequency:  11 times per week


Estimated Hrs Per Day:  .5 hour per day


Patient and/or Family Agrees t:  Yes





Time/GCodes


Time In:  840


Time Out:  858


Total Billed Treatment Time:  18


Total Billed Treatment


1 visit





FA 18 min











DELVIS ROLLINS PT Jan 21, 2019 09:54

## 2019-01-21 NOTE — PROGRESS NOTE-HOSPITALIST
Progress Note


Progress Notes/Assess & Plan


Date Seen


1/21/19


Time Seen by Provider:  15:24


Assessment & Plan


The patient is a 73-year-old white female who was hospitalized with a 

pathologic fracture in the subcapital region of her right hip.  She was known 

to have lung cancer and was determined to have bony metastases including the 

hip prior to the non-traumatic fracture.  She reports that she is beginning to 

walk with a walker and some assistance.  Her plans would be to be discharged as 

soon as possible as she realizes that she is terminal.  She has a son in 

[place] with him she has been staying and that would be her likely destination.





Physical exam: She has chemotherapy baldness.  Lungs are clear to auscultation.

  Breath sounds are somewhat distant.  CV is regular without murmur.








Impression: Carcinoma of the lung.  Bony metastases.  Pathologic fracture right 

hip.





Plan: Discharge planning











URBANO JACQUES MD Jan 21, 2019 15:26

## 2019-01-22 VITALS — DIASTOLIC BLOOD PRESSURE: 62 MMHG | SYSTOLIC BLOOD PRESSURE: 134 MMHG

## 2019-01-22 VITALS — SYSTOLIC BLOOD PRESSURE: 132 MMHG | DIASTOLIC BLOOD PRESSURE: 62 MMHG

## 2019-01-22 VITALS — SYSTOLIC BLOOD PRESSURE: 115 MMHG | DIASTOLIC BLOOD PRESSURE: 60 MMHG

## 2019-01-22 RX ADMIN — METOCLOPRAMIDE SCH MG: 10 TABLET ORAL at 09:39

## 2019-01-22 RX ADMIN — METOCLOPRAMIDE SCH MG: 10 TABLET ORAL at 05:48

## 2019-01-22 RX ADMIN — METOCLOPRAMIDE SCH MG: 10 TABLET ORAL at 17:14

## 2019-01-22 RX ADMIN — FENTANYL CITRATE PRN MCG: 50 INJECTION, SOLUTION INTRAMUSCULAR; INTRAVENOUS at 09:48

## 2019-01-22 RX ADMIN — EZETIMIBE SCH MG: 10 TABLET ORAL at 09:40

## 2019-01-22 RX ADMIN — SOLIFENACIN SUCCINATE SCH MG: 5 TABLET, FILM COATED ORAL at 09:42

## 2019-01-22 RX ADMIN — DICYCLOMINE HYDROCHLORIDE SCH EA: 20 TABLET ORAL at 09:42

## 2019-01-22 RX ADMIN — LEVOTHYROXINE SODIUM SCH MCG: 50 TABLET ORAL at 05:49

## 2019-01-22 RX ADMIN — DICYCLOMINE HYDROCHLORIDE SCH EA: 20 TABLET ORAL at 21:17

## 2019-01-22 RX ADMIN — ASPIRIN SCH MG: 325 TABLET, COATED ORAL at 09:38

## 2019-01-22 RX ADMIN — LANSOPRAZOLE SCH MG: 15 CAPSULE, DELAYED RELEASE ORAL at 05:47

## 2019-01-22 RX ADMIN — AMLODIPINE BESYLATE SCH MG: 5 TABLET ORAL at 09:40

## 2019-01-22 NOTE — PROGRESS NOTE-HOSPITALIST
Progress Note


Progress Notes/Assess & Plan


Date Seen


1/22/19


Time Seen by Provider:  11:29


Assessment & Plan


The patient reports that she continues to improve.  Her pain is controlled.  

She has been able to walk in the [place] with a walker and assistance.  The plan 

is for her to transfer to an inpatient rehabilitation facility at [facility].  This is near her son's home and reports her needs 

perfectly.





Physical exam: Lungs are clear to auscultation.  CV is regular without murmur.  

Abdomen is soft.  Extremities show minimal pedal edema.





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  2.COPD.





Plan: Discharge to IRF tomorrow.











URBANO JACQUES MD Jan 22, 2019 11:31

## 2019-01-22 NOTE — PHYSICAL THERAPY DAILY NOTE
PT Daily Note-Current


Subjective


Pt was supine in bed and agreed to PT.  Pt reports she is leaving to [place] 

tomorrow morning around 9 or 10.





Pain





   Numeric Pain Scale:  3


   Location:  Right


   Location Body Site:  Hip





Mental Status


Patient Orientation:  Person, Normal For Age





Transfers


Therapy Code Descriptions/Definitions 





Functional [place] Measure:


0=Not Assessed/NA        4=Minimal Assistance


1=Total Assistance        5=Supervision or Setup


2=Maximal Assistance  6=Modified [place]


3=Moderate Assistance 7=Complete [place]








Therapy Quality Codes:


6    Independent with activity with or without an assistive device


5    Patient requires set up or clean up by helper.  Patient completes activity

  by  themselves


4    Supervision or touching assist (CGA). [place] provide cues , steadying 

assist


3    The helper provides less than half the effort to complete the activity


2    The helper provides more than half the effort to complete the activity


1    Dependent.  The helper does all the effort to complete an activity 


7    Patient refused to complete or attempt activity


9    The patient did not perform the activity before the current illness or 

injury


88  Not attempted due to Medical conditions or safety concerns


Transfers (B, C, W/C) (FIM):  4


Scootin


Supine to/from Sit:  5


Sit to/from Stand:  4





Weight Bearing


Right Lower Extremity:  Right


Full Weight Bearing


Left Lower Extremity:  Left


Full Weight Bearing





Gait Training


Gait (FIM):  5


Distance (FIM):  3=150 ft


Distance:  150'


Gait Level of Assist:  5


Gait Persons Needed:  1


Gait Assistive Device:  FWW





Exercises


Seated Therapy Exercises:  Ankle pumps, Long arc quads


Seated Reps:  10





Assessment


Current Status:  Fair Progress


Pt was able to perform bed mobility with SBA. Pt requires increased amount of 

time to successfully perform tasks due to pain and processing. Pt able to sit<>

stand from EOB to FWW with CGA and verbal cues for hand placement. Pt amb to 

bathroom and need cues for safety. Pt required min A with sit<>stand from 

toilet to FWW. Pt able to amb with ' with SBA. Pt returned to room and 

was able to perform seated LE ex in recliner. Pt is now in recliner with all 

needs met.





PT Long Term Goals


Long Term Goals


PT Long Term Goals Time Frame:  2019


Transfers (B,C,W/C) (FIM):  4


Gait (FIM):  1


Gait distance (FIM):  1=up to 49 ft


Distance:  20'


Gait Level of Assist:  4


Gait Assistive Device:  FWW





PT Plan


Problem List


Problem List:  Activity Tolerance, Functional Strength, Safety, Balance, Gait, 

Transfer, Bed Mobility





Treatment/Plan


Treatment Plan:  Continue Plan of Care


Treatment Plan:  Bed Mobility, Education, Functional Activity Deysi, Functional 

Strength, Gait, Safety, Therapeutic Exercise, Transfers


Treatment Duration:  2019


Frequency:  11 times per week


Estimated Hrs Per Day:  .5 hour per day


Patient and/or Family Agrees t:  Yes





Time/GCodes


Time In:  1410


Time Out:  1433


Total Billed Treatment Time:  23


Total Billed Treatment


1 visit





FA x2 23 min











DELVIS ROLLINS PT 2019 14:44

## 2019-01-22 NOTE — PHYSICAL THERAPY DAILY NOTE
PT Daily Note-Current


Subjective


Pt was up in recliner and agreed to PT.





Pain





   Numeric Pain Scale:  3


   Location:  Right


   Location Body Site:  Hip





Mental Status


Patient Orientation:  Person, Normal For Age





Transfers


Therapy Code Descriptions/Definitions 





Functional [place] Measure:


0=Not Assessed/NA        4=Minimal Assistance


1=Total Assistance        5=Supervision or Setup


2=Maximal Assistance  6=Modified [place]


3=Moderate Assistance 7=Complete [place]








Therapy Quality Codes:


6    Independent with activity with or without an assistive device


5    Patient requires set up or clean up by helper.  Patient completes activity

  by  themselves


4    Supervision or touching assist (CGA). [place] provide cues , steadying 

assist


3    The helper provides less than half the effort to complete the activity


2    The helper provides more than half the effort to complete the activity


1    Dependent.  The helper does all the effort to complete an activity 


7    Patient refused to complete or attempt activity


9    The patient did not perform the activity before the current illness or 

injury


88  Not attempted due to Medical conditions or safety concerns


Transfers (B, C, W/C) (FIM):  5


Scootin


Sit to/from Stand:  5





Weight Bearing


Right Lower Extremity:  Right


Full Weight Bearing


Left Lower Extremity:  Left


Full Weight Bearing





Gait Training


Gait (FIM):  2


Distance (FIM):  9=636-28 ft


Distance:  125'


Gait Level of Assist:  5


Gait Persons Needed:  1


Gait Assistive Device:  FWW


Pt amb with RLE ext rotated





Exercises


Seated Therapy Exercises:  Ankle pumps, Long arc quads


Seated Reps:  15





Assessment


Current Status:  Good Progress


Pt was able to sit<>stand from recliner to FWW with SBA. Pt did not need cues 

for safety of hand placement. Pt was able to amb with ' with SBA. Pt's 

gait is slow due to pain and pt amb with a flexed trunk posture.  Pt returned 

to recliner and as able to perform seated LE ex. Pt is in recliner with all 

needs met.





PT Long Term Goals


Long Term Goals


PT Long Term Goals Time Frame:  2019


Transfers (B,C,W/C) (FIM):  4


Gait (FIM):  1


Gait distance (FIM):  1=up to 49 ft


Distance:  20'


Gait Level of Assist:  4


Gait Assistive Device:  FWW





PT Plan


Problem List


Problem List:  Activity Tolerance, Functional Strength, Safety, Balance, Gait, 

Transfer





Treatment/Plan


Treatment Plan:  Continue Plan of Care


Treatment Plan:  Bed Mobility, Education, Functional Activity Deysi, Functional 

Strength, Gait, Safety, Therapeutic Exercise, Transfers


Treatment Duration:  2019


Frequency:  11 times per week


Estimated Hrs Per Day:  .5 hour per day


Patient and/or Family Agrees t:  Yes





Time/GCodes


Time In:  928


Time Out:  940


Total Billed Treatment Time:  12


Total Billed Treatment


1 visit





FA 12 min











DELVIS ROLLINS PT 2019 10:41

## 2019-01-22 NOTE — NUR
Pt has been accepted by the Acute Rehab Unit at [facility]. They can accept pt 
Wednesday morning the 23rd. Pt's son Linus Ross will transport pt when discharged and  she 
will be admitted directly to their unit In [place]. Pt and family are pleased and accepting 
of the continued care plan. Will follow and assit with ra tomorrow.

## 2019-01-23 VITALS — SYSTOLIC BLOOD PRESSURE: 115 MMHG | DIASTOLIC BLOOD PRESSURE: 60 MMHG

## 2019-01-23 VITALS — DIASTOLIC BLOOD PRESSURE: 63 MMHG | SYSTOLIC BLOOD PRESSURE: 132 MMHG

## 2019-01-23 RX ADMIN — LEVOTHYROXINE SODIUM SCH MCG: 50 TABLET ORAL at 06:37

## 2019-01-23 RX ADMIN — ASPIRIN SCH MG: 325 TABLET, COATED ORAL at 09:12

## 2019-01-23 RX ADMIN — LANSOPRAZOLE SCH MG: 15 CAPSULE, DELAYED RELEASE ORAL at 06:36

## 2019-01-23 RX ADMIN — METOCLOPRAMIDE SCH MG: 10 TABLET ORAL at 06:36

## 2019-01-23 RX ADMIN — AMLODIPINE BESYLATE SCH MG: 5 TABLET ORAL at 09:13

## 2019-01-23 RX ADMIN — DICYCLOMINE HYDROCHLORIDE SCH EA: 20 TABLET ORAL at 09:14

## 2019-01-23 RX ADMIN — EZETIMIBE SCH MG: 10 TABLET ORAL at 09:14

## 2019-01-23 RX ADMIN — METOCLOPRAMIDE SCH MG: 10 TABLET ORAL at 10:34

## 2019-01-23 RX ADMIN — SOLIFENACIN SUCCINATE SCH MG: 5 TABLET, FILM COATED ORAL at 09:15

## 2019-01-23 NOTE — DISCHARGE INST-SIMPLE/STANDARD
Discharge Inst-Standard


Patient Instructions/Follow Up


Activity as Tolerated:  Yes


Goal:  


Proceed to IRF at [facility] as on the discharge sequence


Discharge Diet:  No Restrictions











URBANO JACQUES MD Jan 23, 2019 11:11

## 2019-01-23 NOTE — PROGRESS NOTE-HOSPITALIST
Progress Note


HPI/CC on Admission


Pt is a 73yoCF with a PMH of HTN and metastatic lung cancer to liver and pelvis 

who presented with worsening hip pain. She states she has always had hip pain 

since the 70s but over the past few weeks it has gotten a lot worse. She was 

diagnosed with lung cancer in September of this year and has completed 5 cycles 

of chemo there and is scheduled for her next cycle next week. Because of her 

worsening hip pain MRI was done of pelvis which revealed metastatic disease. 

She was referred to Dr Gleason to palliative rain but before she could make that 

appointment was seen in the ER on 1/9 plain films were done and negative. She 

has been using a fentanyl patch at home but has still had severe pain. She has 

been in such pain that she has not been able to bend to sit on the toilet and 

has been urinating in a trash can even. Imaging today reveals a displaced right 

subcapital hip fracture. She is to be admitted for operative repair.





Progress Notes/Assess & Plan


Date Seen


1/23/19


Time Seen by Provider:  11:02


Assess & Plan/Chief Complaint


The patient continues to improve.  She is to transfer to the rehabilitation 

facility at [facility].  Her son and grandson are here to achieve 

this.  She has no specific request.





Physical exam: Color is good.  Lungs are clear to auscultation.  CV is regular.





Impression: Carcinoma of the lung, non-small cell.  2.COPD.  3.pathologic 

fracture right hip.





Plan: Dismissed and transferred to inpatient rehabilitation facility in [place].


Labs


Patient resulted labs reviewed.


Imaging:  Reviewed Imaging Report











URBANO JACQUES MD Jan 23, 2019 11:04

## 2019-01-23 NOTE — NUR
Arrangements completed for pt discharge to [facility]. Physician 
discharge orders, recent progress notes,discharge instructions, and Medication List faxed to 
[facility] and discharge instructions given to pt and family.

## 2019-01-23 NOTE — NUR
DISCHARGE INSTRUCTIONS GIVE TO PATIENT AND WENT OVER WITH FAMILY AT BEDSIDE. ALL HOME 
MEDICATIONS GIVEN BACK TO PATIENT. NO COMPLAINTS FROM PATIENT AT THIS TIME. PATIENT LEFT 
FLOOR VIA WHEELCHAIR, ACCOMPANIED BY FAMILY AND STAFF.

## 2019-08-14 NOTE — DIAGNOSTIC IMAGING REPORT
INDICATION: Right hip arthroplasty.



COMPARISON: 01/17/2019.



FINDINGS: Single view of the pelvis demonstrates well-seated

right hip arthroplasty. Left hip is intact. There is no fracture

or dislocation. No unexpected radiopaque foreign body.



IMPRESSION: Well-seated right hip arthroplasty.



Dictated by: 



  Dictated on workstation # [id number] [facility] to use CMP from 7/25/169 for treatment today per Dr Coronel. Copy faxed over to scan.

## 2022-04-30 NOTE — XMS REPORT
MU2 Ambulatory Summary

 Created on: 10/24/2018



Reina Griggs

External Reference #: [id number]

: 1945

Sex: Female



Demographics







 Address  [address]

[place]  [address]

 

 Home Phone  [phone]

 

 Preferred Language  English

 

 Marital Status  

 

 [employer] Affiliation  Unknown

 

 Race  White

 

 Ethnic Group  Not  or 





Author







 Author  Navin Raza

 

 [facility]

 

 Address  [address]

[place], [place]  [id number]



 

 Phone  [phone]







Care Team Providers







 Care Team Member Name  Role  Phone

 

 Navin Raza  PCP  [phone]

 

 Navin Raza  PreferredProvider  [phone]







Allergies and Adverse Reactions







 Name  Reaction  Notes

 

 PENICILLINS      







Plan of Treatment







 Planned Activity  Comments  Planned Date  Planned Time  Plan/Goal

 

 CBC With Auto Differential     2012  12:00 AM   

 

 Chest X-ray, PA and lateral     2012  12:00 AM   

 

 Diagnostic Mammogram     2012  12:00 AM   

 

 Screening mammography, bilateral     2015  12:00 AM   







Medications







 Active 

 

 Name  Start Date  Estimated Completion Date  SIG  Comments

 

 nystatin-triamcinolone 100,000-0.1 unit/g-% topical cream  2013     apply 
to the affected area(s) by topical route 2 times per day in the morning and 
evening   

 

 tramadol 50 mg oral tablet  2014     TAKE 1 TABLET BY MOUTH EVERY 6 HOURS 
AS NEEDED   

 

 tramadol 50 mg oral tablet  10/23/2014     TAKE 1 TABLET BY MOUTH EVERY 6 
HOURS AS NEEDED   

 

 tramadol 50 mg oral tablet  10/23/2014     TAKE 1 TABLET BY MOUTH EVERY 6 
HOURS AS NEEDED   

 

 metoclopramide HCl 10 mg oral tablet  2014     TAKE 1 TABLET BY MOUTH 
FOUR TIMES DAILY 30 MINUTES BEFORE MEALS AND AT BEDTIME   

 

 Abdominal Belt 1 abdominal binder  2015     Use PRN for incisional support
   

 

 tramadol 50 mg oral tablet  2015     TAKE 1 TABLET BY MOUTH EVERY 6 HOURS 
AS NEEDED   

 

 tramadol 50 mg oral tablet  2015     TAKE 1 TABLET BY MOUTH EVERY 6 HOURS 
AS NEEDED   

 

 metoclopramide HCl 10 mg oral tablet  2015     TAKE 1 TABLET BY MOUTH 
FOUR TIMES DAILY 30 MINUTES BEFORE MEALS AND AT BEDTIME   

 

 levothyroxine 50 mcg oral tablet  2015     TAKE 1 TABLET BY MOUTH ONCE 
DAILY   

 

 metoclopramide HCl 10 mg oral tablet  2016     TAKE 1 TABLET BY MOUTH 
FOUR TIMES DAILY 30 MINUTES BEFORE MEALS AND AT BEDTIME   

 

 ProAir HFA 90 mcg/actuation inhalation HFA aerosol inhaler  2016     
inhale 1 - 2 puffs (90 - 180 mcg) by inhalation route every 6 hours as needed   

 

 ProAir HFA 90 mcg/actuation inhalation HFA aerosol inhaler  10/31/2016     
INHALE 1 TO 2 PUFFS BY MOUTH EVERY 6 HOURS AS NEEDED   

 

 Nebulizer and accessories  10/31/2016     Use as directed DX: COPD RAFAEL: 99   

 

 albuterol sulfate 2.5 mg /3 mL (0.083 %) inhalation solution for nebulization  
10/31/2016     inhale 3 milliliters (2.5 mg) by nebulization route every 8 
hours as needed   

 

 ProAir HFA 90 mcg/actuation inhalation HFA aerosol inhaler  10/31/2016     
INHALE 1 - 2 PUFFS (90 - 180 MCG) BY INHALATION ROUTE EVERY 6 HOURS AS NEEDED   

 

 Zetia 10 mg oral tablet  2018  1/3/2019  take 1 tablet (10 mg) by oral 
route once daily for 30 days   

 

 tramadol 50 mg oral tablet  2018     take 1 tablet by oral route every 4 
hours as needed   

 

 levothyroxine 50 mcg oral tablet  2018  TAKE 1 TABLET BY MOUTH 
EVERY DAY. NEED APPOINTMENT FOR FURTHER REFILLS   

 

 nystatin-triamcinolone 100,000-0.1 unit/gram-% topical ointment  2018     
apply to the affected area(s) by topical route 2 times per day   

 

 Vesicare 5 mg oral tablet  2018     TAKE 1 TABLET BY MOUTH DAILY   

 

 imipramine HCl 50 mg oral tablet  2018     TAKE 2 TABLETS BY MOUTH EVERY 
MORNING AND 3 EVERY NIGHT AT BEDTIME   

 

 metoclopramide HCl 10 mg oral tablet  2018     TAKE 1 TABLET BY MOUTH FOUR 
TIMES DAILY 30 MINUTES BEFORE MEALS AND AT BEDTIME FOR 30 DAYS   

 

 Duragesic 50 mcg/hr transdermal patch 72 hour  10/15/2018  2018  apply 1 
patch (50 mcg/hour) by transdermal route every 72 hours for 30 days   









  

 

 Name  Start Date  Expiration Date  SIG  Comments

 

 tramadol 50 mg oral tablet  2013  1/15/2014  take 1 tablet (50 mg) by 
oral route every 6 hours as needed for 30 days   

 

 Reglan 10 mg oral tablet  2014  take 1 tablet (10 mg) by oral 
route 4 times per day 30 minutes before meals and at bedtime   

 

 omeprazole 40 mg oral capsule,delayed release(DR/EC)  10/13/2014  10/8/2015  
take 1 capsule (40 mg) by oral route once daily before a meal for 30 days   

 

 Synthroid 50 mcg oral tablet  10/23/2014  10/18/2015  take 1 tablet (50 mcg) 
by oral route once daily for 90 days   

 

 Zithromax Z-Paul 250 mg oral tablet  2014  Take 2 tablets the 
first day (500 mg) followed by 1 tablet (250 mg) days 2-5. for 5 days   

 

 Medrol (Paul) 4 mg oral tablets,dose pack  2014     take as directed   

 

 azithromycin 250 mg oral tablet  3/24/2015  3/29/2015  take 2 tablets (500 mg) 
by oral route once daily for 1 day then 1 tablet (250 mg) by oral route once 
daily for 4 days   

 

 Bactrim -160 mg oral tablet  2016  10/6/2016  take 1 tablet by oral 
route 2 times per day for 7 days   

 

 prednisone 20 mg oral tablet  2018  take 2 tablet (20 mg) by 
oral route once daily   

 

 azithromycin 250 mg oral tablet  2018  take 2 tablets (500 mg) 
by oral route once daily for 1 day then 1 tablet (250 mg) by oral route once 
daily for 4 days   









 Discontinued 

 

 Name  Start Date  Discontinued Date  SIG  Comments

 

 nystatin-triamcinolone 100,000-0.1 unit/g-% topical cream  2014
  APPLY TO THE AFFECTED AREA TWICE DAILY , IN MORNING AND EVENING   

 

 Synthroid 50 mcg oral tablet  10/23/2014  2016  TAKE 1 TABLET (50 MCG) BY 
ORAL ROUTE ONCE DAILY FOR 90 DAYS   

 

 Reglan 10 mg oral tablet  2014  TAKE 1 TABLET (10 MG) BY 
ORAL ROUTE 4 TIMES PER DAY 30 MINUTES BEFORE MEALS AND AT BEDTIME   

 

 oxycodone 15 mg oral tablet  2014  take 1 tablet (15 mg) by 
oral route every 6 hours   

 

 Medrol (Paul) 4 mg oral tablets,dose pack  3/24/2015  2016  take as 
directed   







Problem List







 Description  Status  Onset

 

 Bipolar Disorder  Active   

 

 Chronic Obstructive Pulmonary Disease  Active   

 

 Gastroesophageal Reflux  Active   

 

 Hypothyroidism, acquired  Active  10/31/2013

 

 Abdominal Pain, RUQ  Active   







Vital Signs







 Date  Time  BP-Sys(mm[Hg]  BP-Sabrina(mm[Hg])  HR(bpm)  RR(rpm)  Temp  WT  HT  HC  
BMI  BSA  BMI Percentile  O2 Sat(%)

 

 10/10/2018  9:02:00 AM  152 mmHg  70 mmHg  86 bpm  16 rpm  97.7 F  151 lbs  
66.5 in     24.0067 kg/m  1.7927 m     98 %

 

 2018  5:27:00 PM  146 mmHg  82 mmHg  87 bpm  20 rpm  98.6 F  168 lbs  
66.5 in     26.71 kg/m2  1.89 m2     97 %

 

 2018  9:00:00 AM  148 mmHg  80 mmHg  78 bpm  16 rpm  96.2 F  168 lbs  66.5 
in     26.7094 kg/m  1.8909 m     99 %

 

 10/26/2016  5:04:00 PM  132 mmHg  74 mmHg  84 bpm  18 rpm  97.8 F  176.125 lbs
                 95 %

 

 2016  7:10:00 PM  124 mmHg  68 mmHg  94 bpm  20 rpm  99 F  169 lbs  66.5 
in     26.87 kg/m2  1.90 m2     95 %

 

 2016  10:18:00 AM  148 mmHg  80 mmHg  82 bpm  18 rpm  97.8 F  169 lbs  
66.5 in     26.8684 kg/m  1.8965 m     97 %

 

 3/24/2015  6:08:00 PM  120 mmHg  78 mmHg  89 bpm  20 rpm  98.3 F  160 lbs     
            96 %

 

 2015  1:55:00 PM  154 mmHg  80 mmHg  88 bpm  20 rpm  97.3 F     66.5 in  
             

 

 2015  1:36:00 PM  166 mmHg  82 mmHg  94 bpm  24 rpm  96.2 F  162 lbs  66.5 
in     25.7555 kg/m  1.8568 m     98 %

 

 2015  3:17:00 PM  140 mmHg  76 mmHg  95 bpm  20 rpm  96 F                 
   99 %

 

 2015  1:48:00 PM  160 mmHg  78 mmHg  87 bpm  24 rpm  96.5 F               
     94 %

 

 2014  1:41:00 PM  156 mmHg  84 mmHg  94 bpm  20 rpm  96.3 F  166 lbs  
66.5 in     26.3914 kg/m  1.8796 m     97 %

 

 2014  9:43:00 AM  142 mmHg  64 mmHg  100 bpm  18 rpm  97.2 F  166 lbs  
66.5 in     26.39 kg/m2  1.88 m2      

 

 2014  5:05:00 PM  150 mmHg  62 mmHg  99 bpm  20 rpm  97.6 F  170 lbs  
66.5 in     27.0274 kg/m  1.9021 m     97 %

 

 2014  5:54:00 PM  145 mmHg  90 mmHg  93 bpm  18 rpm  97.5 F  170 lbs  
66.5 in     27.03 kg/m2  1.90 m2     96 %

 

 10/6/2014  3:21:00 PM  140 mmHg  82 mmHg  82 bpm  18 rpm  97.6 F  170 lbs  
66.5 in     27.0274 kg/m  1.9021 m      

 

 10/31/2013  10:02:00 AM  118 mmHg  64 mmHg  78 bpm  18 rpm  98 F  161 lbs  
66.5 in     25.60 kg/m2  1.85 m2      

 

 2013  1:28:00 PM  150 mmHg  80 mmHg  84 bpm  16 rpm  98.1 F  161 lbs  
66.5 in     25.5965 kg/m  1.8511 m     97 %

 

 2012  8:27:00 AM  136 mmHg  68 mmHg  72 bpm  18 rpm  97.6 F  157 lbs  
66.5 in     24.9606 kg/m  1.83 m2      







Social History







 Name  Description  Comments

 

 Tobacco  Current every day smoker  10/26/2016 -  







History of Procedures







 Date Ordered  Description  Order Status

 

 2016 12:00 AM  COMPLETE CBC W/AUTO DIFF WBC  Reviewed

 

 2016 12:00 AM  COMPREHEN METABOLIC PANEL  Reviewed

 

 2016 12:00 AM  LIPID PANEL  Reviewed

 

 2016 12:00 AM  GLYCOSYLATED HEMOGLOBIN TEST  Reviewed

 

 2016 12:00 AM  ASSAY THYROID STIM HORMONE  Reviewed

 

 2016 12:00 AM  AIRWAY INHALATION TREATMENT  Reviewed

 

 10/26/2016 12:00 AM  COMPLETE CBC W/AUTO DIFF WBC  Reviewed

 

 10/26/2016 12:00 AM  COMPREHEN METABOLIC PANEL  Reviewed

 

 10/26/2016 12:00 AM  CHEST X-RAY 2VW FRONTAL&LATL  Reviewed

 

 2012 12:00 AM  COMPUTER DX MAMMOGRAM ADD-ON  Reviewed

 

 2012 12:00 AM  COMPREHEN METABOLIC PANEL  Reviewed

 

 2012 12:00 AM  LIPID PANEL  Reviewed

 

 2012 12:00 AM  CHEST X-RAY 2VW FRONTAL&LATL  Reviewed

 

 2012 12:00 AM  CHEST X-RAY 2VW FRONTAL&LATL  Reviewed

 

 2018 12:00 AM  MAMMOGRAM BOTH BREASTS  Returned

 

 10/15/2018 12:00 AM  COMPLETE CBC W/AUTO DIFF WBC  Returned

 

 10/15/2018 12:00 AM  COMPREHEN METABOLIC PANEL  Returned

 

 2013 12:00 AM  MAMMOGRAM SCREENING  Reviewed

 

 2013 12:00 AM  COMPLETE CBC W/AUTO DIFF WBC  Reviewed

 

 2013 12:00 AM  COMPREHEN METABOLIC PANEL  Reviewed

 

 2013 12:00 AM  ASSAY THYROID STIM HORMONE  Reviewed

 

 10/6/2014 12:00 AM  COMPLETE CBC W/AUTO DIFF WBC  Reviewed

 

 10/6/2014 12:00 AM  COMPREHEN METABOLIC PANEL  Reviewed

 

 10/6/2014 12:00 AM  GLYCOSYLATED HEMOGLOBIN TEST  Reviewed

 

 10/6/2014 12:00 AM  ASSAY THYROID STIM HORMONE  Reviewed

 

 10/6/2014 12:00 AM  INFLUENZA VIRUS VACCINE SPLIT VIRUS 3/> YRS IM  Reviewed

 

 2014 12:00 AM  CT ABDOMEN W/O & W/DYE  Reviewed

 

 2014 12:00 AM  CT PELVIS W/O & W/DYE  Reviewed

 

 2014 12:00 AM  CT ABD & PELV 1/> REGNS  Reviewed

 

 2014 12:00 AM  ECHO EXAM OF ABDOMEN  Reviewed

 

 2014 12:00 AM  COMPLETE CBC AUTOMATED  Reviewed

 

 2014 12:00 AM  COMPREHEN METABOLIC PANEL  Reviewed

 

 2014 12:00 AM  ASSAY OF AMYLASE  Reviewed

 

 2014 12:00 AM  ASSAY OF LIPASE  Reviewed

 

 2014 12:00 AM  COMPLETE CBC W/AUTO DIFF WBC  Reviewed







Results Summary







 Date and Description  Results

 

 2012 11:18 AM  GLUCOSE 97.0 mg/dLSODIUM 136.0 mmol/LPOTASSIUM 4.20 mmol/
LCHLORIDE 102.0 mmol/LCO2 22.0 mmol/LBUN 7.0 mg/dLCREATININE 1.0 mg/dLSGOT/AST 
12.0 IU/LSGPT/ALT 6.0 IU/LALK PHOS 128.0 IU/LTOTAL PROTEIN 7.40 g/dLALBUMIN 
4.20 g/dLTOTAL BILI 0.40 mg/dLCALCIUM 9.30 mg/dLAGE 66 GFR NonAA 55 GFR AA 67 
eGFR 55 eGFR AA* 60 TRIGLYCERIDES 299.0 mg/dLCHOLESTEROL 293.0 mg/dLHDL 33.0 mg/
dLTOT CHOL/HDL 8.9 LDL (CALC) 200.0 mg/dL

 

 10/24/2013 12:32 PM  WBC 11.1 RBC 5.19 HGB 14.80 g/dLHCT 44.0 %MCV 85.0 fLMCH 
28.50 pgMCHC 33.60 g/dLRDW SD 46 RDW CV 14.90 %MPV 10.50 fLPLT 357 NRBC# 0.00 
NRBC% 0.0 %NEUT 63.70 %%LYMP 26.0 %%MONO 8.50 %%EOS 1.50 %%BASO 0.30 %#NEUT 
7.07 #LYMP 2.88 #MONO 0.94 #EOS 0.17 #BASO 0.03 MANUAL DIFF NOT IND TSH 1.140 
uIU/mLGLUCOSE 109.0 mg/dLSODIUM 136.0 mmol/LPOTASSIUM 4.70 mmol/LCHLORIDE 104.0 
mmol/LCO2 22.0 mmol/LBUN 20.0 mg/dLCREATININE 0.90 mg/dLSGOT/AST 14.0 IU/LSGPT/
ALT 13.0 IU/LALK PHOS 90.0 IU/LTOTAL PROTEIN 7.20 g/dLALBUMIN 4.20 g/dLTOTAL 
BILI 0.40 mg/dLCALCIUM 9.70 mg/dLAGE 68 GFR NonAA 62 GFR AA 75 eGFR >60 mL/min/
1.73 m2eGFR AA* >60 

 

 10/6/2014 4:10 PM  HGB A1C 7.20 %Est Avg Glucose 159.9 mg/dLTSH 1.860 uIU/
mLWBC 11.5 RBC 5.21 HGB 14.60 g/dLHCT 43.0 %MCV 83.0 fLMCH 28.0 pgMCHC 34.0 g/
dLRDW SD 46 RDW CV 15.20 %MPV 10.20 fLPLT 311 NRBC# 0.00 NRBC% 0.0 %NEUT 65.30 %
%LYMP 25.20 %%MONO 8.0 %%EOS 1.20 %%BASO 0.30 %#NEUT 7.51 #LYMP 2.89 #MONO 0.92 
#EOS 0.14 #BASO 0.03 MANUAL DIFF SEE BELOW SEGS 69 LYMPHS 20 MONOS 9 EOS 1.0 %
BASO 1.0 %WBC 11.5 RBC 5.21 HGB 14.60 g/dLHCT 43.0 %MCV 83.0 fLMCH 28.0 pgMCHC 
34.0 g/dLRDW SD 46 RDW CV 15.20 %MPV 10.20 fLPLT 311 NRBC# 0.00 NRBC% 0.0 %NEUT 
65.30 %%LYMP 25.20 %%MONO 8.0 %%EOS 1.20 %%BASO 0.30 %#NEUT 7.51 #LYMP 2.89 #
MONO 0.92 #EOS 0.14 #BASO 0.03 MANUAL DIFF PENDING GLUCOSE 97.0 mg/dLSODIUM 
136.0 mmol/LPOTASSIUM 4.30 mmol/LCHLORIDE 103.0 mmol/LCO2 22.0 mmol/LBUN 28.0 mg
/dLCREATININE 0.80 mg/dLSGOT/AST 15.0 IU/LSGPT/ALT 12.0 IU/LALK PHOS 121.0 IU/
LTOTAL PROTEIN 7.60 g/dLALBUMIN 4.10 g/dLTOTAL BILI 0.20 mg/dLCALCIUM 9.10 mg/
dLAGE 69 GFR NonAA 71 GFR AA 86 eGFR 60 eGFR AA* 60 

 

 2014 2:40 PM  WBC 9.1 RBC 5.23 HGB 14.90 g/dLHCT 44.0 %MCV 84.0 fLMCH 
28.50 pgMCHC 33.90 g/dLRDW SD 48 RDW CV 16.0 %MPV 10.30 fLPLT 348 NRBC# 0.00 
NRBC% 0.0 GLUCOSE 68.0 mg/dLSODIUM 136.0 mmol/LPOTASSIUM 4.30 mmol/LCHLORIDE 
102.0 mmol/LCO2 24.0 mmol/LBUN 12.0 mg/dLCREATININE 0.90 mg/dLSGOT/AST 48.0 IU/
LSGPT/.0 IU/LALK PHOS 293.0 IU/LTOTAL PROTEIN 7.30 g/dLALBUMIN 4.20 g/
dLTOTAL BILI 0.40 mg/dLCALCIUM 9.70 mg/dLAGE 69 GFR NonAA 62 GFR AA 75 eGFR 60 
eGFR AA* 60 LIPASE 14.0 U/LAMYLASE 36 IU/L

 

 2014 5:45 AM  GLUCOSE 107.0 mg/dLSODIUM 136.0 mmol/LPOTASSIUM 3.90 mmol/
LCHLORIDE 102.0 mmol/LCO2 23.0 mmol/LBUN 10.0 mg/dLCREATININE 0.70 mg/dLSGOT/
AST 45.0 IU/LSGPT/.0 IU/LALK PHOS 287.0 IU/LTOTAL PROTEIN 6.80 g/
dLALBUMIN 3.60 g/dLTOTAL BILI 0.50 mg/dLCALCIUM 9.50 mg/dLAGE 69 GFR NonAA 83 
GFR  eGFR 60 eGFR AA* 60 WBC 21.0 RBC 5.05 HGB 13.80 g/dLHCT 42.70 %MCV 
85.0 fLMCH 27.30 pgMCHC 32.30 g/dLRDW SD 50 RDW CV 16.50 %MPV 10.40 fLPLT 301 
NRBC# 0.00 NRBC% 0.0 %NEUT 78.40 %%LYMP 13.90 %%MONO 7.70 %%EOS 0.0 %%BASO 0.0 %
#NEUT 16.48 #LYMP 2.93 #MONO 1.62 #EOS 0.00 #BASO 0.01 MANUAL DIFF SEE BELOW 
SEGS 75 BANDS 1 LYMPHS 17 MONOS 7 ATYP LYMPHS 1+ 

 

 2016 11:05 AM  WBC 10.0 RBC 5.49 HGB 15.50 g/dLHCT 47.60 %MCV 87.0 fLMCH 
28.20 pgMCHC 32.60 g/dLRDW SD 48 RDW CV 15.30 %MPV 9.80 fLPLT 290 NRBC# 0.00 
NRBC% 0.0 %NEUT 58.30 %%LYMP 32.50 %%MONO 7.40 %%EOS 0.80 %%BASO 0.50 %#NEUT 
5.83 #LYMP 3.25 #MONO 0.74 #EOS 0.08 #BASO 0.05 MANUAL DIFF NOT IND HGB A1C 
5.60 %Est Avg Glucose 114.0 mg/dLTSH 2.360 uIU/mLGLUCOSE 96.0 mg/dLSODIUM 137.0 
mmol/LPOTASSIUM 4.70 mmol/LCHLORIDE 102.0 mmol/LCO2 25.0 mmol/LBUN 17.0 mg/
dLCREATININE 1.0 mg/dLSGOT/AST 16.0 IU/LSGPT/ALT 13.0 IU/LALK PHOS 109.0 IU/
LTOTAL PROTEIN 7.30 g/dLALBUMIN 4.60 g/dLTOTAL BILI 0.40 mg/dLCALCIUM 9.70 mg/
dLAGE 71 GFR NonAA 55 GFR AA 67 eGFR 55 eGFR AA* >60 CHOLESTEROL 292.0 mg/dL

 

 10/26/2016 5:39 PM  WBC 11.6 RBC 5.52 HGB 15.50 g/dLHCT 48.40 %MCV 88.0 fLMCH 
28.10 pgMCHC 32.0 g/dLRDW SD 47 RDW CV 14.60 %MPV 9.70 fLPLT 298 NRBC# 0.00 NRBC
% 0.0 %NEUT 53.80 %%LYMP 32.20 %%MONO 8.80 %%EOS 4.10 %%BASO 0.60 %#NEUT 6.22 #
LYMP 3.72 #MONO 1.02 #EOS 0.48 #BASO 0.07 MANUAL DIFF NOT IND GLUCOSE 92.0 mg/
dLSODIUM 138.0 mmol/LPOTASSIUM 4.50 mmol/LCHLORIDE 99.0 mmol/LCO2 26.0 mmol/
LBUN 13.0 mg/dLCREATININE 1.0 mg/dLSGOT/AST 15.0 IU/LSGPT/ALT 15.0 IU/LALK PHOS 
125.0 IU/LTOTAL PROTEIN 7.50 g/dLALBUMIN 4.30 g/dLTOTAL BILI 0.30 mg/dLCALCIUM 
9.60 mg/dLAGE 71 GFR NonAA 55 GFR AA 67 eGFR 55 eGFR AA* >60 







History Of Immunizations







 Name  Date Admin  Mfg Name  Mfg Code  Trade Name  Lot#  Route  Inj  Vis Given  
Vis Pub  CVX

 

 Flulaval quad  10/18/2014  sanofi pasteur  [employer]  Fluzone Quadrivalent  [id number]  
Intramuscular  Left Deltoid  10/18/2014  2014  141







History of Past Illness







 Name  Date of Onset  Comments

 

 Bipolar Disorder      

 

 Gastroesophageal Reflux      

 

 Chronic Obstructive Pulmonary Disease      

 

 Hypothyroidism, acquired  10/31/2013   

 

 Abdominal Pain, RUQ      

 

 Screening Mammogram  May  9 2012  9:23AM   

 

 Hyperlipidemia, unspecified  2012  8:37AM   

 

 Cough  2012  8:37AM   

 

 Pneumonia  2012  5:57PM   

 

 Upper Respiratory Infection  2012  4:57PM   

 

 Screening Mammogram For High Risk Patient  Aug 30 2012 10:13AM   

 

 Screening Mammogram  Sep 17 2013  8:10AM   

 

 Hypothyroidism, Acquired  Sep 17 2013  1:30PM   

 

 Chronic kidney disease  Sep 17 2013  1:30PM   

 

 Yeast Of Skin  Sep 17 2013  1:30PM   

 

 Hypothyroidism, Acquired  Oct 31 2013 10:07AM   

 

 Hypothyroidism, Acquired  Oct  6 2014  3:25PM   

 

 Hyperglycemia  Oct  6 2014  3:25PM   

 

 Esophageal Reflux  Oct  6 2014  3:25PM   

 

 Diabetes Mellitus, Type II  Oct 13 2014 10:43AM   

 

 Esophageal Reflux  Oct 13 2014 10:43AM   

 

 Bronchitis, Acute  2014  5:55PM   

 

 Upper Respiratory Infections  2014  5:55PM   

 

 Hernia, incisional  Dec 16 2014  5:12PM   

 

 Abdominal pain  Dec 16 2014  5:12PM   

 

 Abdominal Pain  Dec 17 2014  9:49AM   

 

 Abdominal pain, RUQ  Dec 22 2014  2:02PM   

 

 Chronic Cholecystitis  Dec 22 2014  2:02PM   

 

 Incisional Hernia: No Obstruction Or Gangrene  Dec 22 2014  2:02PM   

 

 Postoperative Follow-up: Cholecystectomy  2015  1:49PM   

 

 Postoperative Follow-Up Visit  2015  1:38PM   

 

 Postoperative Follow-Up Visit  2015  1:56PM   

 

 Bronchitis  Mar 24 2015  6:10PM   

 

 Postoperative Follow-Up Visit  2015  3:23PM   

 

 Screening Mammogram  May 11 2015 12:13PM   

 

 Hypothyroidism, Acquired  Sep 14 2016 10:25AM   

 

 Low Back Pain  Sep 14 2016 10:25AM   

 

 Hyperglycemia  Sep 14 2016 10:25AM   

 

 Bronchitis  Sep 29 2016  7:12PM   

 

 Wheezing  Sep 29 2016  7:12PM   

 

 Coughing  Sep 29 2016  7:12PM   

 

 Cough  Oct 26 2016  5:06PM   

 

 Shortness of breath  Oct 26 2016  5:06PM   

 

 Chronic obstructive pulmonary disease, unspecified COPD type  Oct 26 2016  5:
06PM   

 

 Encounter for screening mammogram for breast cancer  2018 10:38AM   

 

 Hypertension  2018  9:09AM   

 

 Hyperlipidemia, unspecified  2018  9:09AM   

 

 Fibromyalgia  2018  9:09AM   

 

 COPD exacerbation  Sep 25 2018  5:32PM   

 

 Back strain  Sep 25 2018  5:32PM   

 

 Liver cancer, primary, with metastasis from liver to other site  Oct 15 2018  8
:07AM   

 

 Small cell lung cancer  Oct 10 2018  9:06AM   

 

 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   







Payers







 Insurance Name  Company Name  Plan Name  Plan Number  Policy Number  Policy 
Group Number  Start Date

 

    Medicare RHC Medicare RHC     [id number]     N/A

 

    Medicare Part B  Medicare [facility]     [id number]     N/A

 

    BCBS  Bcbs [facility]     [id number]     N/A

 

    [place] [employer] [facility] Medical Assistance Prog     
[id number]     N/A

 

    Medicare Part A  Medicare Part A     [id number]     N/A

 

    Medicare Part A  Medicare - Lab/Xray     [id number]     N/A

 

    Medicare RHC  Medicare RHC     [id number]     N/A







History of Encounters







 Visit Date  Visit Type  Provider

 

 10/10/2018  Office visit  Navin Raza MD

 

 2018  Office visit  Sasha Cole APRN

 

 2018  Office visit  Navin Raza MD

 

 10/26/2016  Office visit  Saqib Bustillos APRN

 

 2016  Office visit  Saqib Bustillos APRN

 

 2016  Office visit   

 

 2016  Office visit  Navin Raza MD

 

 3/24/2015  Office visit  Carley BUSH

 

 2015  Office visit  Javier Preston MD

 

 2015  Office visit  Javier Preston MD

 

 2015  Office visit  Javier Preston MD

 

 2015  Office visit  Javier Preston MD

 

 2014  Hospital  Javier Preston MD

 

 2014  [facility]  Javier Preston MD

 

 2014  Office visit   

 

 2014  Office visit  Javier Preston MD

 

 2014  Office visit  Navin Raza MD

 

 2014  Office visit  Rosamaria Maria APRN

 

 2014  Office visit  Sugey Hawkins APRN

 

 10/13/2014  Office visit  Navin Raza MD

 

 10/6/2014  Office visit   

 

 10/6/2014  Office visit  Navin Raza MD

 

 10/31/2013  Office visit  Navin Raza MD

 

 2013  Office visit  Navin Raza MD

 

 2012  Office visit  Navin Raza MD [phone]

## 2025-05-02 NOTE — PHYSICAL THERAPY DAILY NOTE
EMERGENCY DEPARTMENT ENCOUNTER      CHIEF COMPLAINT    Chief Complaint   Patient presents with    Abdominal Pain    High Blood Sugar Symptomatic       HPI    Naheed Bronson is a 52 year old female with past medical history of HTN and T1DM who presents to the ED with generalized abdominal pain and hyperglycemia.    Patient reports that for the past week she has had generalized abdominal pain, diarrhea, generalized weakness, polyuria, and polydipsia. She reports that she has been checking her blood glucose levels and taking her insulin as prescribed, however has been unable to get better control of her diabetes. She reports several episodes of non-bilious non-bloody emesis today and diarrhea. She denies any additional complaints. Patient denies fever, chills, night sweats, chest pain, palpitations, shortness of breath, cough, or known sick contacts.    Chart Review: I reviewed the patient's medications, allergies, and past medical and surgical history in Epic. ED Triage Note reviewed    History obtained from patient     ALLERGIES    ALLERGIES:   Allergen Reactions    Naftifine Hcl Other (See Comments) and RASH     Foot turned gray and skin fell off foot (per Pt)      Ace Inhibitors SWELLING     lip swelling    Cefuroxime Axetil RASH    Nickel HIVES    Sulfa Antibiotics     Sulfasalazine HIVES    Trimethoprim Other (See Comments)     Pt is unsure    Flagyl [Metronidazole Hcl] RASH       CURRENT MEDICATIONS    Current Facility-Administered Medications   Medication Dose Route Frequency Provider Last Rate Last Admin    dextrose 50 % injection 25 g  25 g Intravenous PRN Melvin Mitchell,         glucagon (GLUCAGEN) injection 1 mg  1 mg Intramuscular PRN Melvin Mitchell,         dextrose (GLUTOSE) 40 % gel 15 g  15 g Oral PRN Melvin Mitchell,         sodium chloride 0.9 % injection 10 mL  10 mL Intravenous PRN Melvin Mitchell,         sodium chloride 0.9 % injection 2 mL  2 mL Intracatheter 2 times per day Melvin Mitchell,  PT Daily Note-Current


Subjective


Pt. states she does not hear well and asks this PTA to repeat several times. 

States she feels people think she is confused but really its a hearing issue. 

Agrees to rx.





Pain





   Location:  No Pain Reported





Mental Status


Patient Orientation:  Person, Place, Time, Situation


Attachments:  [place]os Catheter





Transfers


Therapy Code Descriptions/Definitions 





Functional [place] Measure:


0=Not Assessed/NA        4=Minimal Assistance


1=Total Assistance        5=Supervision or Setup


2=Maximal Assistance  6=Modified [place]


3=Moderate Assistance 7=Complete [place]








Therapy Quality Codes:


6    Independent with activity with or without an assistive device


5    Patient requires set up or clean up by helper.  Patient completes activity

  by  themselves


4    Supervision or touching assist (CGA). [place] provide cues , steadying 

assist


3    The helper provides less than half the effort to complete the activity


2    The helper provides more than half the effort to complete the activity


1    Dependent.  The helper does all the effort to complete an activity 


7    Patient refused to complete or attempt activity


9    The patient did not perform the activity before the current illness or 

injury


88  Not attempted due to Medical conditions or safety concerns


Transfers (B, C, W/C) (FIM):  4


Scootin


Rollin


Supine to/from Sit:  4 (required min assist for LEs into bed)


Sit to/from Stand:  5





Weight Bearing


Right Lower Extremity:  Right


Full Weight Bearing


Left Lower Extremity:  Left


Full Weight Bearing





Gait Training


Distance (FIM):  3=150 ft


Gait Assistive Device:  FWW


150 ft slow, FWW CGA, pt. states she feels weak but no LOB etc





Exercises


Supine Ex:  Bridging, Ankle pumps, Rolling, Heel Slides, Short Arc Quads, 

Scooting, Straight leg raise, Hip abd/add


Supine Reps:  12





Assessment


Current Status:  Good Progress


needs much instruction/demonstration to complete task/Rx





PT Long Term Goals


Long Term Goals


PT Long Term Goals Time Frame:  2019


Transfers (B,C,W/C) (FIM):  4


Gait (FIM):  1


Gait distance (FIM):  1=up to 49 ft


Distance:  20'


Gait Level of Assist:  4


Gait Assistive Device:  FWW





PT Plan


Treatment/Plan


Treatment Plan:  Continue Plan of Care


Treatment Plan:  Bed Mobility, Education, Functional Activity Deysi, Functional 

Strength, Gait, Safety, Therapeutic Exercise, Transfers


Treatment Duration:  2019


Frequency:  11 times per week


Estimated Hrs Per Day:  .5 hour per day


Patient and/or Family Agrees t:  Yes





Safety Risks/Education


Patient Education:  Gait Training, Transfer Techniques, Correct Positioning, 

Disease Process, Safety Issues


Teaching Recipient:  Patient


Teaching Methods:  Demonstration, Discussion


Response to Teaching:  Verbalize Understanding, Return Demonstration, 

Reinforcement Needed





Time/GCodes


Time In:  1445


Time Out:  1510


Total Billed Treatment Time:  25


Total Billed Treatment


1,GT13,EX12


G Codes Necessary:  MARK Green PTA 2019 15:12 DO        insulin regular (human) (HumuLIN R) 100 units in sodium chloride 0.9% 100 mL infusion  0-45 Units/hr Intravenous Continuous Melvin Mitchell,  5.5 mL/hr at 05/01/25 2331 5.5 Units/hr at 05/01/25 2331    dextrose 50 % injection 25 g  25 g Intravenous PRN Melvin Mitchell,         dextrose 50 % injection 12.5 g  12.5 g Intravenous PRN Melvin Mitchell,         glucagon (GLUCAGEN) injection 1 mg  1 mg Intramuscular PRN Melvin Mitchell,         dextrose (GLUTOSE) 40 % gel 15 g  15 g Oral PRN Melvin Mitchell,         dextrose (GLUTOSE) 40 % gel 30 g  30 g Oral PRN Melvin Mitchell,         sodium chloride 0.9 % injection 10 mL  10 mL Intravenous PRN Sakshi Chaudhari PA-C        sodium chloride 0.9 % injection 2 mL  2 mL Intracatheter 2 times per day Sakshi Chaudhari PA-C         Current Outpatient Medications   Medication Sig Dispense Refill    spironolactone (ALDACTONE) 25 MG tablet Take 4 tablets by mouth daily. 360 tablet 3    losartan (COZAAR) 100 MG tablet Take 1 tablet by mouth daily. 30 tablet 6    spironolactone (ALDACTONE) 25 MG tablet Take 25 mg by mouth daily. 2 tabs 2 x day      potassium chloride (K-TAB) 20 MEQ ER tablet Take 20 mEq by mouth daily.      Mounjaro 2.5 MG/0.5ML Solution Auto-injector Inject 2.5 mg into the skin 1 day a week. 2 mL 1    carvedilol (COREG) 6.25 MG tablet Take 1 tablet by mouth in the morning and 1 tablet in the evening. Take with meals. 180 tablet 3    amLODIPine (NORVASC) 10 MG tablet Take 1 tablet by mouth daily. 30 tablet 0    furosemide (LASIX) 20 MG tablet Take 1 tablet by mouth daily. 30 tablet 0    atorvastatin (LIPITOR) 80 MG tablet Take 1 tablet by mouth nightly. 30 tablet 1    aspirin (ECOTRIN) 81 MG EC tablet Take 1 tablet by mouth daily. 30 tablet 1    ezetimibe (ZETIA) 10 MG tablet Take 1 tablet by mouth daily. 30 tablet 1    diclofenac (VOLTAREN) 75 MG EC tablet Take 75 mg by mouth 2 times daily as needed.      Insulin Lispro, 1 Unit Dial, (HumaLOG  KwikPen) 100 UNIT/ML pen-injector Inject into the skin 3 times daily (before meals). INJECT INSULIN CARB RATIO 1 UNITS FOR 1 GRAM UNDER THE SKIN AS DIRECTED. MAXIMUM DAILY DOSE IS UP  UNITS DAILY      vitamin B-12 (CYANOCOBALAMIN) 1000 MCG tablet Take 1,000 mcg by mouth daily.      Cholecalciferol (VITAMIN D3 PO) Take 1 Dose by mouth daily.      levothyroxine 200 MCG tablet Take 1 tablet by mouth daily (before breakfast). 30 tablet 0    insulin glargine (Lantus SoloStar) 100 UNIT/ML pen-injector Inject 50 Units into the skin daily. Prime 2 units before each dose.      Upadacitinib ER (Rinvoq) 15 MG TABLET SR 24 HR Take 15 mg by mouth daily.      albuterol 108 (90 Base) MCG/ACT inhaler Inhale 2 puffs into the lungs every 4 hours as needed for Other (cough, wheezing.). 1 each 0    Symbicort 80-4.5 MCG/ACT inhaler 2 puffs  in the morning and 2 puffs in the evening.         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)     Type I (juvenile type) diabetes mellitus without mention of complication, not stated as uncontrolled     age 15 diagnosed       SURGICAL HISTORY    Past Surgical History:   Procedure Laterality Date    Past surgical history      PSH of none    Thyroidectomy         SOCIAL HISTORY    Social History     Tobacco Use    Smoking status: Former     Average packs/day: 0.5 packs/day for 12.0 years (6.0 ttl pk-yrs)     Types: Cigarettes     Start date: 2002     Quit date: 2014     Years since quittin.0     Passive exposure: Past    Smokeless tobacco: Current   Vaping Use    Vaping status: never used   Substance Use Topics    Alcohol use: Yes      Alcohol/week: 28.0 standard drinks of alcohol     Types: 28 Standard drinks or equivalent per week     Comment: 2 beers daily, 2 rum drinks daily    Drug use: No       FAMILY HISTORY    Family History   Problem Relation Age of Onset    Diabetes Father             Asthma Father     Diabetes Mother     Hypertension Mother     Diabetes Sister     Diabetes Maternal Grandmother             Diabetes Maternal Grandfather             Diabetes Paternal Grandmother             Other Paternal Grandfather                REVIEW OF SYSTEMS    Constitutional: Denies fever or chills  Eyes:  Denies drainage, dry eyes, or changes to vision  HENT:  Denies headache, nasal drainage, or sore throat  Respiratory:  Denies cough or shortness of breath   Cardiovascular:  Denies chest pain or palpitations   GI: See HPI  :  See HPI  Integument:  Denies rash   MSK: Denies significant new injury, trauma, or  joint pain  Neuro: Denies numbness, tingling, or weakness    PHYSICAL EXAM    Vitals:    25 2210 25 2230 25 2331 25 0000   BP:  118/62 127/59 (!) 145/69   BP Location:       Patient Position:       Pulse: 97 (!) 101 (!) 102 (!) 104   Resp: 18 (!) 21  18   Temp:    98.6 °F (37 °C)   TempSrc:    Oral   SpO2:  96% 98%    Weight:       Height:           Pulse Ox Interpretation: Within normal limits  Constitutional: Patient sitting up in bed. She appears well developed and well nourished. NAD. Non-toxic appearance  Eyes: EOMI, PERRL, conjunctivae normal  HENT:  Head is normocephalic without obvious abnormality. External ears without lesions. Nose midline. Oropharynx moist without edema, erythema or exudate  Neck: Normal range of motion. No tenderness. Supple   Respiratory:  Clear to auscultation bilaterally. No noted rales, wheezing, or rhonchi. Normal respiratory effort on room air   Cardiovascular:  Normal rate, normal rhythm. No noted murmurs, gallops, or rubs  GI:  Obese  abdomen that is soft and nondistended. Tenderness to palpation in middle and upper abdomen. Normoactive bowel sounds. No mass, rebound, or guarding. No Diego's sign or McBurney's point tenderness. No acute abdomen   Musculoskeletal:  No edema, tenderness, or deformities   Integument:  Well hydrated, normal skin color, texture and turgor. No rash or lesions noted  Lymphatic:  No lymphadenopathy noted  Neurologic:  Alert, age appropriate.  Normal motor function. Normal sensory function. No focal deficits noted    RADIOLOGY    CT CHEST ABDOMEN PELVIS WO CONTRAST   Final Result   IMPRESSION:     1. 7 mm solid noncalcified pleural-based right lower lobe pulmonary nodule.   This is new compared to 4/2/2025. Mild groundglass opacity surrounding this   nodule suggest an inflammatory/infectious etiology.   2. Otherwise, no acute abnormality in the chest, abdomen, or pelvis.   3. Intrauterine device, appropriately positioned.   4. Bilateral ventral abdominal superficial subcutaneous fat stranding, most   likely representing subcutaneous injection sites. Alternatively, cellulitis   or localized edema could be considered. Recommend correlation with skin   examination and medication administration history.               Electronically Signed by: Hector Quiñones MD   Signed on: 5/1/2025 11:44 PM   Created on Workstation ID: [id number]   Signed on Workstation ID: [id number]      US LIVER GALLBLADDER PANCREAS (RUQ)   Final Result   IMPRESSION:     1.  No acute findings. No sonographic evidence of cholelithiasis or acute   cholecystitis.   2.  Mild hepatomegaly and diffuse hepatic steatosis.      Electronically Signed by: Rafal Shah MD   Signed on: 5/1/2025 9:57 PM   Created on Workstation ID: [id number]   Signed on Workstation ID: [id number]      XR CHEST AP OR PA   Final Result   IMPRESSION:      No acute cardiopulmonary process.      Electronically Signed by: Rafal Shah MD   Signed on: 5/1/2025 9:33 PM   Created on  Workstation ID: [id number]   Signed on Workstation ID: [id number]            LABS    Results for orders placed or performed during the hospital encounter of 05/01/25   Comprehensive Metabolic Panel    Specimen: Blood, Venous   Result Value    Fasting Status     Sodium 130 (L)    Potassium 4.2    Chloride 88 (L)    Carbon Dioxide 22    Anion Gap 24 (H)    Glucose 565 (HH)    BUN 21 (H)    Creatinine 1.77 (H)    Glomerular Filtration Rate 34 (L)     Comment: eGFR 30-59 mL/min/1.73m2 = Moderate decrease in kidney function. Stage 3 CKD (chronic kidney disease) or moderate kidney disease. Estimated GFR calculated using the CKD-EPI-R (2021) equation that does not include race in the creatinine calculation.    BUN/Cr 12    Calcium 8.0 (L)    Bilirubin, Total 1.4 (H)    GOT/ (H)    GPT/ (H)    Alkaline Phosphatase 84    Albumin 3.1 (L)    Protein, Total 8.2    Globulin 5.1 (H)    A/G Ratio 0.6 (L)   Lipase    Specimen: Blood, Venous   Result Value    Lipase 34   Urinalysis with microscopy without culture    Specimen: Urine clean catch   Result Value    COLOR, URINALYSIS Straw    APPEARANCE, URINALYSIS Cloudy    GLUCOSE, URINALYSIS >1000 (A)    BILIRUBIN, URINALYSIS Negative    KETONES, URINALYSIS 20 (A)    SPECIFIC GRAVITY, URINALYSIS 1.017     Comment: Measured by refractometry  Specific Gravity results may be affected by elevated protein, glucose, or contrast media.    OCCULT BLOOD, URINALYSIS Moderate (A)    PH, URINALYSIS 5.0    PROTEIN, URINALYSIS 30 (A)    UROBILINOGEN, URINALYSIS 0.2    NITRITE, URINALYSIS Negative    LEUKOCYTE ESTERASE, URINALYSIS Negative    SQUAMOUS EPITHELIAL, URINALYSIS 1 to 5    ERYTHROCYTES, URINALYSIS 1 to 2    LEUKOCYTES, URINALYSIS 1 to 5    BACTERIA, URINALYSIS Few (A)    HYALINE CASTS, URINALYSIS None Seen    YEAST Present (A)    MUCUS Present   CBC with Automated Differential (performable only)    Specimen: Blood, Venous   Result Value    WBC 9.7    RBC 3.63 (L)    HGB 10.2  (L)    HCT 29.1 (L)    MCV 80.2    MCH 28.1    MCHC 35.1    RDW-CV 15.5 (H)    RDW-SD 45.0        NRBC 0    Neutrophil, Percent 80     Comment: Auto diff verified by manual slide review.    Lymphocytes, Percent 9    Mono, Percent 9    Eosinophils, Percent 0    Basophils, Percent 0    Immature Granulocytes 2    Absolute Neutrophils 7.8 (H)    Absolute Lymphocytes 0.8 (L)    Absolute Monocytes 0.9    Absolute Eosinophils  0.0    Absolute Basophils 0.0    Absolute Immature Granulocytes 0.2   TROPONIN I, HIGH SENSITIVITY    Specimen: Blood, Venous   Result Value    Troponin I, High Sensitivity 37   COVID/Flu/RSV panel    Specimen: Nasal, Mid-turbinate; Swab   Result Value    Rapid SARS-COV-2 by PCR Not Detected    Influenza A by PCR Not Detected    Influenza B by PCR Not Detected    RSV BY PCR Not Detected    Isolation Guidelines      Comment: Do not use this test result as the sole decision-maker for discontinuation of isolation.   Clinical evaluation should be considered for other respiratory illness requiring transmission-based isolation.    -    No fever (<99.0 F/37.2 C) for at least 24 hours without the use of fever-reducing medications    AND  -    Respiratory symptoms have improved or resolved (e.g. cough, shortness of breath)     AND  -    COVID-19 negative test    See COVID-19 Deisolation Resource Guide    Procedural Comment      Comment: SARS-COV-2 nucleic acid has not been detected indicating the absence of COVID-19.    This test was performed using the [employer] Xpert Xpress SARS-CoV-2/Flu/RSV RT-PCR test that has been given Emergency Use Authorization (EUA) by the [place] Food and Drug Administration (FDA). These tests are considered definitive and do not need to be confirmed by another method.   Procalcitonin    Specimen: Blood, Venous   Result Value    Procalcitonin 3.24 (H)     Comment:   Bacterial Sepsis:  <0.5 ng/mL:    Sepsis not likely. Localized bacterial infection possible.  0.5 - 2  ng/mL: Sepsis or other conditions possible  >2.0 ng/mL:    High risk of sepsis/septic shock    NOTE: If bacterial sepsis is of high clinical suspicion but PCT<2 ng/mL,  consider recheck PCT 6-24 hours after initial test.     Lower Respiratory Tract Infection (LRTI):  <0.1 ng/mL:       Bacterial infection highly unlikely  0.1 - 0.25 ng/mL: Bacterial infection unlikely  0.26 - 0.5 ng/mL: Bacterial infection likely  > 0.5 ng/mL:      Bacterial infection highly likely    NOTE: Patients with advanced CKD or medical/surgical trauma may have  elevated baseline PCT (>0.5 ng/mL) in the absence of bacterial infection. If  bacterial infection is suspected, consider repeat PCT in 2 to 4 days to guide de-escalation or discontinuation of antibiotic therapy.  Higher reference range cutoffs may be appropriate for patients <3 days old.    Falsely decreased PCT results are seen when Total Bilirubin is  >33.7 mg/dL.     Prothrombin Time (INR/PT)    Specimen: Blood, Venous   Result Value    Protime- PT 12.0 (H)    INR 1.1     Comment: INR Therapeutic Range: 2.0 to 3.0 (2.5 to 3.5 recommended for recurrent thrombotic episodes and mechanical prosthetic heart valves.)   Partial Thromboplastin Time (PTT)    Specimen: Blood, Venous   Result Value    PTT 34 (H)   Creatine Kinase    Specimen: Blood, Venous   Result Value    CK 1,153 (H)   Thyroid Stimulating Hormone Reflex    Specimen: Blood, Venous   Result Value    TSH 0.779     Comment: Findings most consistent with euthyroid state, no additional testing suggested. TSH may be normal in patients with thyroid dysfunction and pituitary disease. Clinical correlation recommended.    (Reflex TSH algorithm is not recommended in hospitalized patients. A variety of drugs, as well as serious acute and chronic illnesses may alter thyroid function tests. Commonly implicated drugs include glucocorticoids, dopamine, carbamazepine, iodine, amiodarone, lithium and heparin.)  Reference range in pregnancy  (if applicable):  First Trimester 0.1 - 4.0 mcUnits/mL  Second Trimester 0.2 - 4.0 mcUnits/mL  Third Trimester 0.3 - 4.5 mcUnits/mL     GLUCOSE, BEDSIDE - POINT OF CARE    Specimen: Blood   Result Value    GLUCOSE, BEDSIDE - POINT OF CARE 514 (HH)     Comment: Notified RN   BLOOD GAS, VENOUS -POINT OF CARE    Specimen: Blood, Venous   Result Value    PH, VENOUS - POINT OF CARE 7.42    PCO2, VENOUS - POINT OF CARE 32 (L)    PO2, VENOUS - POINT OF CARE 41    HCO3, VENOUS - POINT OF CARE 21 (L)    BASE EXCESS / DEFICIT, VENOUS - POINT OF CARE -3 (L)    O2 SATURATION, VENOUS - POINT OF CARE 78   ISTAT8 VENOUS  POINT OF CARE    Specimen: Blood   Result Value    BUN - POINT OF CARE 20    SODIUM - POINT OF CARE 130 (L)    POTASSIUM - POINT OF CARE 4.2    CHLORIDE - POINT OF CARE 92 (L)    TCO2 - POINT OF CARE 21    ANION GAP - POINT OF CARE 22 (H)    HEMATOCRIT - POINT OF CARE 33.0 (L)    HEMOGLOBIN - POINT OF CARE 11.2 (L)    GLUCOSE - POINT OF CARE 599 (HH)    CALCIUM, IONIZED - POINT OF CARE 0.92 (L)    CREATININE - POINT OF CARE 2.00 (H)    GLOMERULAR FILTRATION RATE - POINT OF CARE 30 (L)     Comment: eGFR 30-59 mL/min/1.73m2 = Moderate decrease in kidney function. Stage 3 CKD (chronic kidney disease) or moderate kidney disease. Estimated GFR calculated using the CKD-EPI-R (2021) equation that does not include race in the creatinine calculation.   LACTIC ACID VENOUS POINT OF CARE    Specimen: Blood, Venous   Result Value    LACTIC ACID, VENOUS - POINT OF CARE 2.4 (HH)   GLUCOSE, BEDSIDE - POINT OF CARE    Specimen: Blood   Result Value    GLUCOSE, BEDSIDE - POINT OF CARE 557 (HH)       EKG 5/1/25 20:01:    Rate: 115  Rhythm: Sinus tachycardia. No STEMI  Abnormality: no. Reviewed with ED physician Dr. Goodman.  QTc: 487    ED COURSE & MEDICAL DECISION MAKING    52 year old female with past medical history of HTN and T1DM who presents to the ED with generalized abdominal pain and hyperglycemia.    Patient febrile to 102.8  and tachycardic on arrival to the ED. Will give 1g PO Tylenol and obtain labs to include VBG and COVID/Influenza/RSV testing.     Hyperglycemia to 599; will give 1L LR and 10 units insulin  VBG with no acidosis or evidence of DKA  Labs significant for anion gap, VIRAL, and elevated liver enzymes and t. Bili; will obtain RUQ US.    I have independently interpreted the RUQ US and have found no acute findings. No sonographic evidence of cholelithiasis or acute cholecystitis. Mild hepatomegaly and diffuse hepatic steatosis.  Final interpretation by radiology    Patient's hyperglycemia increased despite being given insulin and IVF; insulin gtt ordered and will add on CT chest/abdomen/pelvis without contrast and hepatitis labs    EKG with sinus tachycardia, troponin normal  I have independently interpreted the chest xray and have found no acute cardiopulmonary disease  Final interpretation by radiology    UA with few bacteria and yeast. Moderate occult blood and glucosuria.  Patient meeting SIRS criteria with tachycardia and fever; sepsis labs ordered per order set    Patient's lactic acid 2.4 and procalcitonin elevated to 3.24  No identifiable source of infection;  per sepsis order set recommendation for Ceftriaxone, Vancomycin and Flagyl however patient with multiple drug allergies. Spoke with pharmacy and will start Zosyn given possible intraabdominal source and patient's allergies    External records reviewed; TTE on 3/3/25 with ED 70%    Discussed all labs and imaging results with patient and that would recommend admission given sepsis with unknown source and continued monitoring of hyperglycemia. Patient agreeable to stay    Spoke with hospitalist and patient will be admitted inpatient with telemetry under Dr. Osuna; inpatient orders placed    After examination by hospitalist, notified by hospitalist that antibiotics discontinued as patient reports improvement in abdominal pain and no current diarrhea, so do not  believe antibiotics warranted at this time.       ED Medication Orders (From admission, onward)      Ordered Start     Status Ordering Provider    05/01/25 2252 05/01/25 2253  insulin regular (human) (HumuLIN R) 100 units in sodium chloride 0.9% 100 mL infusion  CONTINUOUS         Last MAR action: MARIE Perez    05/01/25 2123 05/01/25 2124  lactated ringers bolus 1,000 mL  ONCE         Last MAR action: Restarted ARPAN CHAUDHARI    05/01/25 2105 05/01/25 2106  famotidine (PEPCID) injection 20 mg  ONCE         Last MAR action: Given ARPAN CHAUDHARI    05/01/25 2010 05/01/25 2011  insulin regular human (HumuLIN R) (diluted) 1 unit/mL injection 10 Units  ONCE         Last MAR action: Given ARPAN CHAUDHARI    05/01/25 2010 05/01/25 2011  lactated ringers bolus 1,000 mL  ONCE         Last MAR action: Completed ARPAN CHAUDHARI    05/01/25 2009 05/01/25 2010  acetaminophen (TYLENOL) tablet 325 mg  ONCE         Last MAR action: Given ARPAN CHAUDHARI    05/01/25 1859 05/01/25 1900  acetaminophen (TYLENOL) tablet 650 mg  ONCE         Last MAR action: Given KHAMPANE, THOTSAPHONE            I discussed the possible diagnoses with the patient as well as the warning signs and symptoms. The patient understands that this is a provisional diagnosis based on current symptoms which can and do change. If any new symptoms occur or if symptoms worsen, the patient will seek immediate attention for re-evaluation. Patient was also provided with discharge instructions and follow up information. Patient understands and agrees to treatment plan.      Impression:  The primary encounter diagnosis was Hyperglycemia. Diagnoses of Acute kidney injury (CMD), Hepatic steatosis, Hepatomegaly, Yeast UTI, and Abdominal pain, generalized were also pertinent to this visit.      Patient will be admitted to the hospitalist service with telemetry under Dr. Thao Chaudhari PA-C        Critical Care    Performed by: Arpan Chaudhari,  [place]  Authorized by: Sakshi Chaudhari PA-C    Critical care provider statement:     Critical care time (minutes):  35    Critical care time was exclusive of:  Separately billable procedures and treating other patients    Critical care was necessary to treat or prevent imminent or life-threatening deterioration of the following conditions:  Sepsis and renal failure    Critical care was time spent personally by me on the following activities:  Blood draw for specimens, examination of patient, obtaining history from patient or surrogate, discussions with consultants, ordering and performing treatments and interventions, pulse oximetry, re-evaluation of patient's condition, review of old charts, ordering and review of laboratory studies and ordering and review of radiographic studies    Care discussed with: admitting provider            Sakshi Chaudhari PA-C  05/02/25 0124